# Patient Record
Sex: FEMALE | Race: ASIAN | NOT HISPANIC OR LATINO | Employment: UNEMPLOYED | URBAN - METROPOLITAN AREA
[De-identification: names, ages, dates, MRNs, and addresses within clinical notes are randomized per-mention and may not be internally consistent; named-entity substitution may affect disease eponyms.]

---

## 2017-01-15 ENCOUNTER — COMMUNICATION - HEALTHEAST (OUTPATIENT)
Dept: FAMILY MEDICINE | Facility: CLINIC | Age: 52
End: 2017-01-15

## 2017-01-31 ENCOUNTER — OFFICE VISIT - HEALTHEAST (OUTPATIENT)
Dept: FAMILY MEDICINE | Facility: CLINIC | Age: 52
End: 2017-01-31

## 2017-01-31 DIAGNOSIS — G89.29 CHRONIC BILATERAL LOW BACK PAIN WITHOUT SCIATICA: ICD-10-CM

## 2017-01-31 DIAGNOSIS — E55.9 VITAMIN D DEFICIENCY: ICD-10-CM

## 2017-01-31 DIAGNOSIS — S46.001D ROTATOR CUFF INJURY, RIGHT, SUBSEQUENT ENCOUNTER: ICD-10-CM

## 2017-01-31 DIAGNOSIS — K59.00 CONSTIPATION, UNSPECIFIED CONSTIPATION TYPE: ICD-10-CM

## 2017-01-31 DIAGNOSIS — M54.50 CHRONIC BILATERAL LOW BACK PAIN WITHOUT SCIATICA: ICD-10-CM

## 2017-01-31 DIAGNOSIS — E03.9 HYPOTHYROIDISM, UNSPECIFIED TYPE: ICD-10-CM

## 2017-01-31 DIAGNOSIS — F32.9 MAJOR DEPRESSIVE DISORDER, SINGLE EPISODE, UNSPECIFIED: ICD-10-CM

## 2017-01-31 ASSESSMENT — MIFFLIN-ST. JEOR: SCORE: 1054.22

## 2017-02-02 ENCOUNTER — AMBULATORY - HEALTHEAST (OUTPATIENT)
Dept: FAMILY MEDICINE | Facility: CLINIC | Age: 52
End: 2017-02-02

## 2017-02-03 ENCOUNTER — OFFICE VISIT - HEALTHEAST (OUTPATIENT)
Dept: PHYSICAL THERAPY | Facility: REHABILITATION | Age: 52
End: 2017-02-03

## 2017-02-03 DIAGNOSIS — S46.001D ROTATOR CUFF INJURY, RIGHT, SUBSEQUENT ENCOUNTER: ICD-10-CM

## 2017-02-03 DIAGNOSIS — S49.91XD RIGHT SHOULDER INJURY, SUBSEQUENT ENCOUNTER: ICD-10-CM

## 2017-02-07 ENCOUNTER — OFFICE VISIT - HEALTHEAST (OUTPATIENT)
Dept: PHYSICAL THERAPY | Facility: REHABILITATION | Age: 52
End: 2017-02-07

## 2017-02-07 DIAGNOSIS — S49.91XD RIGHT SHOULDER INJURY, SUBSEQUENT ENCOUNTER: ICD-10-CM

## 2017-02-07 DIAGNOSIS — S46.001D ROTATOR CUFF INJURY, RIGHT, SUBSEQUENT ENCOUNTER: ICD-10-CM

## 2017-02-09 ENCOUNTER — OFFICE VISIT - HEALTHEAST (OUTPATIENT)
Dept: PHYSICAL THERAPY | Facility: REHABILITATION | Age: 52
End: 2017-02-09

## 2017-02-09 DIAGNOSIS — S46.001D ROTATOR CUFF INJURY, RIGHT, SUBSEQUENT ENCOUNTER: ICD-10-CM

## 2017-02-09 DIAGNOSIS — S49.91XD RIGHT SHOULDER INJURY, SUBSEQUENT ENCOUNTER: ICD-10-CM

## 2017-02-16 ENCOUNTER — OFFICE VISIT - HEALTHEAST (OUTPATIENT)
Dept: PHYSICAL THERAPY | Facility: REHABILITATION | Age: 52
End: 2017-02-16

## 2017-02-16 DIAGNOSIS — S49.91XD RIGHT SHOULDER INJURY, SUBSEQUENT ENCOUNTER: ICD-10-CM

## 2017-02-16 DIAGNOSIS — S46.001D ROTATOR CUFF INJURY, RIGHT, SUBSEQUENT ENCOUNTER: ICD-10-CM

## 2017-02-24 ENCOUNTER — OFFICE VISIT - HEALTHEAST (OUTPATIENT)
Dept: PHYSICAL THERAPY | Facility: REHABILITATION | Age: 52
End: 2017-02-24

## 2017-02-24 DIAGNOSIS — S49.91XD RIGHT SHOULDER INJURY, SUBSEQUENT ENCOUNTER: ICD-10-CM

## 2017-02-24 DIAGNOSIS — S46.001D ROTATOR CUFF INJURY, RIGHT, SUBSEQUENT ENCOUNTER: ICD-10-CM

## 2017-02-28 ENCOUNTER — OFFICE VISIT - HEALTHEAST (OUTPATIENT)
Dept: FAMILY MEDICINE | Facility: CLINIC | Age: 52
End: 2017-02-28

## 2017-02-28 ENCOUNTER — RECORDS - HEALTHEAST (OUTPATIENT)
Dept: MAMMOGRAPHY | Facility: CLINIC | Age: 52
End: 2017-02-28

## 2017-02-28 DIAGNOSIS — Z12.31 ENCOUNTER FOR SCREENING MAMMOGRAM FOR MALIGNANT NEOPLASM OF BREAST: ICD-10-CM

## 2017-02-28 DIAGNOSIS — R19.7 DIARRHEA: ICD-10-CM

## 2017-02-28 ASSESSMENT — MIFFLIN-ST. JEOR: SCORE: 1056.21

## 2017-03-02 ENCOUNTER — OFFICE VISIT - HEALTHEAST (OUTPATIENT)
Dept: PHYSICAL THERAPY | Facility: REHABILITATION | Age: 52
End: 2017-03-02

## 2017-03-02 DIAGNOSIS — S49.91XD RIGHT SHOULDER INJURY, SUBSEQUENT ENCOUNTER: ICD-10-CM

## 2017-03-02 DIAGNOSIS — S46.001D ROTATOR CUFF INJURY, RIGHT, SUBSEQUENT ENCOUNTER: ICD-10-CM

## 2017-03-07 ENCOUNTER — OFFICE VISIT - HEALTHEAST (OUTPATIENT)
Dept: PHYSICAL THERAPY | Facility: REHABILITATION | Age: 52
End: 2017-03-07

## 2017-03-07 DIAGNOSIS — S46.001D ROTATOR CUFF INJURY, RIGHT, SUBSEQUENT ENCOUNTER: ICD-10-CM

## 2017-03-07 DIAGNOSIS — S49.91XD RIGHT SHOULDER INJURY, SUBSEQUENT ENCOUNTER: ICD-10-CM

## 2017-03-10 ENCOUNTER — OFFICE VISIT - HEALTHEAST (OUTPATIENT)
Dept: PHYSICAL THERAPY | Facility: REHABILITATION | Age: 52
End: 2017-03-10

## 2017-03-10 DIAGNOSIS — S49.91XD RIGHT SHOULDER INJURY, SUBSEQUENT ENCOUNTER: ICD-10-CM

## 2017-03-10 DIAGNOSIS — S46.001D ROTATOR CUFF INJURY, RIGHT, SUBSEQUENT ENCOUNTER: ICD-10-CM

## 2017-03-14 ENCOUNTER — OFFICE VISIT - HEALTHEAST (OUTPATIENT)
Dept: PHYSICAL THERAPY | Facility: REHABILITATION | Age: 52
End: 2017-03-14

## 2017-03-14 DIAGNOSIS — S49.91XD RIGHT SHOULDER INJURY, SUBSEQUENT ENCOUNTER: ICD-10-CM

## 2017-03-14 DIAGNOSIS — S46.001D ROTATOR CUFF INJURY, RIGHT, SUBSEQUENT ENCOUNTER: ICD-10-CM

## 2017-03-16 ENCOUNTER — OFFICE VISIT - HEALTHEAST (OUTPATIENT)
Dept: PHYSICAL THERAPY | Facility: REHABILITATION | Age: 52
End: 2017-03-16

## 2017-03-16 DIAGNOSIS — S49.91XD RIGHT SHOULDER INJURY, SUBSEQUENT ENCOUNTER: ICD-10-CM

## 2017-03-16 DIAGNOSIS — S46.001D ROTATOR CUFF INJURY, RIGHT, SUBSEQUENT ENCOUNTER: ICD-10-CM

## 2017-03-23 ENCOUNTER — OFFICE VISIT - HEALTHEAST (OUTPATIENT)
Dept: PHYSICAL THERAPY | Facility: REHABILITATION | Age: 52
End: 2017-03-23

## 2017-03-23 DIAGNOSIS — S46.001D ROTATOR CUFF INJURY, RIGHT, SUBSEQUENT ENCOUNTER: ICD-10-CM

## 2017-03-23 DIAGNOSIS — M25.521 ELBOW PAIN, RIGHT: ICD-10-CM

## 2017-03-23 DIAGNOSIS — S49.91XD RIGHT SHOULDER INJURY, SUBSEQUENT ENCOUNTER: ICD-10-CM

## 2017-03-31 ENCOUNTER — OFFICE VISIT - HEALTHEAST (OUTPATIENT)
Dept: PHYSICAL THERAPY | Facility: REHABILITATION | Age: 52
End: 2017-03-31

## 2017-03-31 DIAGNOSIS — S46.001D ROTATOR CUFF INJURY, RIGHT, SUBSEQUENT ENCOUNTER: ICD-10-CM

## 2017-03-31 DIAGNOSIS — M25.521 ELBOW PAIN, RIGHT: ICD-10-CM

## 2017-03-31 DIAGNOSIS — S49.91XD RIGHT SHOULDER INJURY, SUBSEQUENT ENCOUNTER: ICD-10-CM

## 2017-04-07 ENCOUNTER — OFFICE VISIT - HEALTHEAST (OUTPATIENT)
Dept: PHYSICAL THERAPY | Facility: REHABILITATION | Age: 52
End: 2017-04-07

## 2017-04-07 DIAGNOSIS — S46.001D ROTATOR CUFF INJURY, RIGHT, SUBSEQUENT ENCOUNTER: ICD-10-CM

## 2017-04-07 DIAGNOSIS — S49.91XD RIGHT SHOULDER INJURY, SUBSEQUENT ENCOUNTER: ICD-10-CM

## 2017-04-07 DIAGNOSIS — M25.521 ELBOW PAIN, RIGHT: ICD-10-CM

## 2017-04-11 ENCOUNTER — OFFICE VISIT - HEALTHEAST (OUTPATIENT)
Dept: PHYSICAL THERAPY | Facility: REHABILITATION | Age: 52
End: 2017-04-11

## 2017-04-11 DIAGNOSIS — S46.001D ROTATOR CUFF INJURY, RIGHT, SUBSEQUENT ENCOUNTER: ICD-10-CM

## 2017-04-11 DIAGNOSIS — S49.91XD RIGHT SHOULDER INJURY, SUBSEQUENT ENCOUNTER: ICD-10-CM

## 2017-04-12 ENCOUNTER — OFFICE VISIT - HEALTHEAST (OUTPATIENT)
Dept: FAMILY MEDICINE | Facility: CLINIC | Age: 52
End: 2017-04-12

## 2017-04-12 DIAGNOSIS — R10.11 RIGHT UPPER QUADRANT ABDOMINAL PAIN: ICD-10-CM

## 2017-04-12 DIAGNOSIS — M75.111 INCOMPLETE TEAR OF RIGHT ROTATOR CUFF: ICD-10-CM

## 2017-04-12 DIAGNOSIS — M54.50 CHRONIC BILATERAL LOW BACK PAIN WITHOUT SCIATICA: ICD-10-CM

## 2017-04-12 DIAGNOSIS — G89.29 CHRONIC BILATERAL LOW BACK PAIN WITHOUT SCIATICA: ICD-10-CM

## 2017-04-12 DIAGNOSIS — M77.11 LATERAL EPICONDYLITIS OF RIGHT ELBOW: ICD-10-CM

## 2017-04-12 DIAGNOSIS — F32.9 MAJOR DEPRESSIVE DISORDER, SINGLE EPISODE, UNSPECIFIED: ICD-10-CM

## 2017-04-12 DIAGNOSIS — E55.9 VITAMIN D DEFICIENCY: ICD-10-CM

## 2017-04-12 DIAGNOSIS — K29.50 CHRONIC GASTRITIS: ICD-10-CM

## 2017-04-12 ASSESSMENT — MIFFLIN-ST. JEOR: SCORE: 1067.83

## 2017-04-13 ENCOUNTER — OFFICE VISIT - HEALTHEAST (OUTPATIENT)
Dept: PHYSICAL THERAPY | Facility: REHABILITATION | Age: 52
End: 2017-04-13

## 2017-04-13 DIAGNOSIS — S49.91XD RIGHT SHOULDER INJURY, SUBSEQUENT ENCOUNTER: ICD-10-CM

## 2017-04-13 DIAGNOSIS — S46.001D ROTATOR CUFF INJURY, RIGHT, SUBSEQUENT ENCOUNTER: ICD-10-CM

## 2017-04-13 DIAGNOSIS — M25.521 ELBOW PAIN, RIGHT: ICD-10-CM

## 2017-04-30 ENCOUNTER — COMMUNICATION - HEALTHEAST (OUTPATIENT)
Dept: FAMILY MEDICINE | Facility: CLINIC | Age: 52
End: 2017-04-30

## 2017-06-01 ENCOUNTER — COMMUNICATION - HEALTHEAST (OUTPATIENT)
Dept: FAMILY MEDICINE | Facility: CLINIC | Age: 52
End: 2017-06-01

## 2017-06-01 DIAGNOSIS — E03.9 HYPOTHYROIDISM: ICD-10-CM

## 2017-08-01 ENCOUNTER — COMMUNICATION - HEALTHEAST (OUTPATIENT)
Dept: FAMILY MEDICINE | Facility: CLINIC | Age: 52
End: 2017-08-01

## 2017-08-01 DIAGNOSIS — K29.50 CHRONIC GASTRITIS: ICD-10-CM

## 2017-08-15 ENCOUNTER — OFFICE VISIT - HEALTHEAST (OUTPATIENT)
Dept: FAMILY MEDICINE | Facility: CLINIC | Age: 52
End: 2017-08-15

## 2017-08-15 DIAGNOSIS — M79.604 PAIN IN BOTH LOWER EXTREMITIES: ICD-10-CM

## 2017-08-15 DIAGNOSIS — E55.9 VITAMIN D DEFICIENCY: ICD-10-CM

## 2017-08-15 DIAGNOSIS — M79.605 PAIN IN BOTH LOWER EXTREMITIES: ICD-10-CM

## 2017-08-15 ASSESSMENT — MIFFLIN-ST. JEOR: SCORE: 1040.61

## 2017-08-18 LAB — ANA SER QL: 0.4 U

## 2017-09-04 ENCOUNTER — COMMUNICATION - HEALTHEAST (OUTPATIENT)
Dept: FAMILY MEDICINE | Facility: CLINIC | Age: 52
End: 2017-09-04

## 2017-09-04 DIAGNOSIS — E03.9 HYPOTHYROIDISM: ICD-10-CM

## 2017-09-28 ENCOUNTER — OFFICE VISIT - HEALTHEAST (OUTPATIENT)
Dept: FAMILY MEDICINE | Facility: CLINIC | Age: 52
End: 2017-09-28

## 2017-09-28 DIAGNOSIS — E03.8 OTHER SPECIFIED HYPOTHYROIDISM: ICD-10-CM

## 2017-09-28 DIAGNOSIS — F32.9 MAJOR DEPRESSIVE DISORDER, SINGLE EPISODE, UNSPECIFIED: ICD-10-CM

## 2017-09-28 DIAGNOSIS — E55.9 VITAMIN D DEFICIENCY: ICD-10-CM

## 2017-09-28 DIAGNOSIS — K59.00 CONSTIPATION, UNSPECIFIED CONSTIPATION TYPE: ICD-10-CM

## 2017-09-28 DIAGNOSIS — B07.0 PLANTAR WART OF LEFT FOOT: ICD-10-CM

## 2017-09-28 DIAGNOSIS — F41.9 ANXIETY: ICD-10-CM

## 2017-09-28 DIAGNOSIS — M72.2 PLANTAR FASCIITIS OF RIGHT FOOT: ICD-10-CM

## 2017-09-28 ASSESSMENT — MIFFLIN-ST. JEOR: SCORE: 1045.15

## 2017-10-12 ENCOUNTER — OFFICE VISIT - HEALTHEAST (OUTPATIENT)
Dept: FAMILY MEDICINE | Facility: CLINIC | Age: 52
End: 2017-10-12

## 2017-10-12 DIAGNOSIS — B07.0 PLANTAR WART OF LEFT FOOT: ICD-10-CM

## 2017-10-12 ASSESSMENT — MIFFLIN-ST. JEOR: SCORE: 1045.15

## 2018-01-23 ENCOUNTER — OFFICE VISIT - HEALTHEAST (OUTPATIENT)
Dept: FAMILY MEDICINE | Facility: CLINIC | Age: 53
End: 2018-01-23

## 2018-01-23 DIAGNOSIS — M54.50 LUMBAR BACK PAIN: ICD-10-CM

## 2018-02-04 ENCOUNTER — COMMUNICATION - HEALTHEAST (OUTPATIENT)
Dept: FAMILY MEDICINE | Facility: CLINIC | Age: 53
End: 2018-02-04

## 2018-02-20 ENCOUNTER — COMMUNICATION - HEALTHEAST (OUTPATIENT)
Dept: FAMILY MEDICINE | Facility: CLINIC | Age: 53
End: 2018-02-20

## 2018-03-06 ENCOUNTER — COMMUNICATION - HEALTHEAST (OUTPATIENT)
Dept: FAMILY MEDICINE | Facility: CLINIC | Age: 53
End: 2018-03-06

## 2018-03-16 ENCOUNTER — OFFICE VISIT - HEALTHEAST (OUTPATIENT)
Dept: FAMILY MEDICINE | Facility: CLINIC | Age: 53
End: 2018-03-16

## 2018-03-16 DIAGNOSIS — M54.9 BACK PAIN: ICD-10-CM

## 2018-03-16 DIAGNOSIS — E55.9 VITAMIN D DEFICIENCY: ICD-10-CM

## 2018-03-16 DIAGNOSIS — R53.83 FATIGUE: ICD-10-CM

## 2018-03-16 LAB
BASOPHILS # BLD AUTO: 0 THOU/UL (ref 0–0.2)
BASOPHILS NFR BLD AUTO: 0 % (ref 0–2)
EOSINOPHIL # BLD AUTO: 0.1 THOU/UL (ref 0–0.4)
EOSINOPHIL NFR BLD AUTO: 2 % (ref 0–6)
ERYTHROCYTE [DISTWIDTH] IN BLOOD BY AUTOMATED COUNT: 12.5 % (ref 11–14.5)
HCT VFR BLD AUTO: 42.5 % (ref 35–47)
HGB BLD-MCNC: 14.5 G/DL (ref 12–16)
LYMPHOCYTES # BLD AUTO: 1.7 THOU/UL (ref 0.8–4.4)
LYMPHOCYTES NFR BLD AUTO: 33 % (ref 20–40)
MCH RBC QN AUTO: 30.6 PG (ref 27–34)
MCHC RBC AUTO-ENTMCNC: 34 G/DL (ref 32–36)
MCV RBC AUTO: 90 FL (ref 80–100)
MONOCYTES # BLD AUTO: 0.3 THOU/UL (ref 0–0.9)
MONOCYTES NFR BLD AUTO: 6 % (ref 2–10)
NEUTROPHILS # BLD AUTO: 3.1 THOU/UL (ref 2–7.7)
NEUTROPHILS NFR BLD AUTO: 59 % (ref 50–70)
PLATELET # BLD AUTO: 173 THOU/UL (ref 140–440)
PMV BLD AUTO: 6.8 FL (ref 7–10)
RBC # BLD AUTO: 4.73 MILL/UL (ref 3.8–5.4)
TSH SERPL DL<=0.005 MIU/L-ACNC: 2.68 UIU/ML (ref 0.3–5)
WBC: 5.2 THOU/UL (ref 4–11)

## 2018-03-16 ASSESSMENT — MIFFLIN-ST. JEOR: SCORE: 1058.76

## 2018-03-19 LAB — 25(OH)D3 SERPL-MCNC: 20.5 NG/ML (ref 30–80)

## 2018-04-04 ENCOUNTER — OFFICE VISIT - HEALTHEAST (OUTPATIENT)
Dept: FAMILY MEDICINE | Facility: CLINIC | Age: 53
End: 2018-04-04

## 2018-04-04 DIAGNOSIS — Z90.49 HISTORY OF CHOLECYSTECTOMY: ICD-10-CM

## 2018-04-04 DIAGNOSIS — R10.11 RIGHT UPPER QUADRANT PAIN: ICD-10-CM

## 2018-04-04 DIAGNOSIS — R10.31 ABDOMINAL PAIN, RLQ: ICD-10-CM

## 2018-04-04 LAB
ALBUMIN SERPL-MCNC: 3.8 G/DL (ref 3.5–5)
ALP SERPL-CCNC: 122 U/L (ref 45–120)
ALT SERPL W P-5'-P-CCNC: 21 U/L (ref 0–45)
AST SERPL W P-5'-P-CCNC: 18 U/L (ref 0–40)
BILIRUB DIRECT SERPL-MCNC: 0.3 MG/DL
BILIRUB SERPL-MCNC: 1 MG/DL (ref 0–1)
PROT SERPL-MCNC: 6.8 G/DL (ref 6–8)

## 2018-04-04 ASSESSMENT — MIFFLIN-ST. JEOR: SCORE: 1036.08

## 2018-04-05 ENCOUNTER — HOSPITAL ENCOUNTER (OUTPATIENT)
Dept: ULTRASOUND IMAGING | Facility: HOSPITAL | Age: 53
Discharge: HOME | End: 2018-04-05
Attending: INTERPRETER

## 2018-04-21 ENCOUNTER — COMMUNICATION - HEALTHEAST (OUTPATIENT)
Dept: FAMILY MEDICINE | Facility: CLINIC | Age: 53
End: 2018-04-21

## 2018-05-21 ENCOUNTER — COMMUNICATION - HEALTHEAST (OUTPATIENT)
Dept: FAMILY MEDICINE | Facility: CLINIC | Age: 53
End: 2018-05-21

## 2018-05-21 DIAGNOSIS — E03.9 HYPOTHYROIDISM: ICD-10-CM

## 2018-05-31 ENCOUNTER — OFFICE VISIT - HEALTHEAST (OUTPATIENT)
Dept: FAMILY MEDICINE | Facility: CLINIC | Age: 53
End: 2018-05-31

## 2018-05-31 DIAGNOSIS — K29.50 CHRONIC GASTRITIS: ICD-10-CM

## 2018-05-31 DIAGNOSIS — R19.7 DIARRHEA IN ADULT PATIENT: ICD-10-CM

## 2018-05-31 ASSESSMENT — MIFFLIN-ST. JEOR: SCORE: 1024.74

## 2018-07-17 ENCOUNTER — OFFICE VISIT - HEALTHEAST (OUTPATIENT)
Dept: FAMILY MEDICINE | Facility: CLINIC | Age: 53
End: 2018-07-17

## 2018-07-17 DIAGNOSIS — R53.83 FATIGUE: ICD-10-CM

## 2018-07-17 DIAGNOSIS — E55.9 VITAMIN D DEFICIENCY: ICD-10-CM

## 2018-07-17 DIAGNOSIS — R19.7 DIARRHEA IN ADULT PATIENT: ICD-10-CM

## 2018-07-17 LAB
BASOPHILS # BLD AUTO: 0 THOU/UL (ref 0–0.2)
BASOPHILS NFR BLD AUTO: 0 % (ref 0–2)
EOSINOPHIL # BLD AUTO: 0.1 THOU/UL (ref 0–0.4)
EOSINOPHIL NFR BLD AUTO: 2 % (ref 0–6)
ERYTHROCYTE [DISTWIDTH] IN BLOOD BY AUTOMATED COUNT: 12.2 % (ref 11–14.5)
HCT VFR BLD AUTO: 44 % (ref 35–47)
HGB BLD-MCNC: 14.7 G/DL (ref 12–16)
LYMPHOCYTES # BLD AUTO: 1.7 THOU/UL (ref 0.8–4.4)
LYMPHOCYTES NFR BLD AUTO: 33 % (ref 20–40)
MCH RBC QN AUTO: 30.4 PG (ref 27–34)
MCHC RBC AUTO-ENTMCNC: 33.3 G/DL (ref 32–36)
MCV RBC AUTO: 91 FL (ref 80–100)
MONOCYTES # BLD AUTO: 0.4 THOU/UL (ref 0–0.9)
MONOCYTES NFR BLD AUTO: 8 % (ref 2–10)
NEUTROPHILS # BLD AUTO: 3 THOU/UL (ref 2–7.7)
NEUTROPHILS NFR BLD AUTO: 57 % (ref 50–70)
PLATELET # BLD AUTO: 173 THOU/UL (ref 140–440)
PMV BLD AUTO: 7.2 FL (ref 7–10)
RBC # BLD AUTO: 4.82 MILL/UL (ref 3.8–5.4)
WBC: 5.3 THOU/UL (ref 4–11)

## 2018-07-17 ASSESSMENT — MIFFLIN-ST. JEOR: SCORE: 1021.34

## 2018-07-18 ENCOUNTER — COMMUNICATION - HEALTHEAST (OUTPATIENT)
Dept: FAMILY MEDICINE | Facility: CLINIC | Age: 53
End: 2018-07-18

## 2018-07-24 ENCOUNTER — AMBULATORY - HEALTHEAST (OUTPATIENT)
Dept: LAB | Facility: CLINIC | Age: 53
End: 2018-07-24

## 2018-07-24 DIAGNOSIS — R19.7 DIARRHEA IN ADULT PATIENT: ICD-10-CM

## 2018-07-25 LAB
O+P STL MICRO: NORMAL

## 2018-07-26 LAB
ALBUMIN SERPL-MCNC: 3.8 G/DL (ref 3.5–5)
ALP SERPL-CCNC: 93 U/L (ref 45–120)
ALT SERPL W P-5'-P-CCNC: 16 U/L (ref 0–45)
AST SERPL W P-5'-P-CCNC: 15 U/L (ref 0–40)
BILIRUB DIRECT SERPL-MCNC: 0.2 MG/DL
BILIRUB SERPL-MCNC: 0.7 MG/DL (ref 0–1)
PROT SERPL-MCNC: 6.5 G/DL (ref 6–8)

## 2018-07-27 ENCOUNTER — AMBULATORY - HEALTHEAST (OUTPATIENT)
Dept: LAB | Facility: CLINIC | Age: 53
End: 2018-07-27

## 2018-07-27 DIAGNOSIS — R19.7 DIARRHEA IN ADULT PATIENT: ICD-10-CM

## 2018-07-27 LAB
C DIFF TOX B STL QL: NEGATIVE
RIBOTYPE 027/NAP1/BI: NORMAL

## 2018-07-28 LAB
SHIGA TOXIN 1: NEGATIVE
SHIGA TOXIN 2: NEGATIVE

## 2018-07-30 LAB
BACTERIA SPEC CULT: NORMAL
GLIADIN IGA SER-ACNC: 1 U/ML
GLIADIN IGG SER-ACNC: <0.4 U/ML
IGA SERPL-MCNC: 194 MG/DL (ref 65–400)
TTG IGA SER-ACNC: 0.1 U/ML
TTG IGG SER-ACNC: <0.6 U/ML

## 2018-08-17 ENCOUNTER — OFFICE VISIT - HEALTHEAST (OUTPATIENT)
Dept: FAMILY MEDICINE | Facility: CLINIC | Age: 53
End: 2018-08-17

## 2018-08-17 DIAGNOSIS — R19.7 DIARRHEA IN ADULT PATIENT: ICD-10-CM

## 2018-08-17 ASSESSMENT — MIFFLIN-ST. JEOR: SCORE: 1028.14

## 2018-09-13 ENCOUNTER — OFFICE VISIT - HEALTHEAST (OUTPATIENT)
Dept: FAMILY MEDICINE | Facility: CLINIC | Age: 53
End: 2018-09-13

## 2018-09-13 DIAGNOSIS — Z90.49 HISTORY OF CHOLECYSTECTOMY: ICD-10-CM

## 2018-09-13 DIAGNOSIS — R19.7 DIARRHEA IN ADULT PATIENT: ICD-10-CM

## 2018-09-13 DIAGNOSIS — K29.50 OTHER CHRONIC GASTRITIS WITHOUT HEMORRHAGE: ICD-10-CM

## 2018-09-13 DIAGNOSIS — Z23 NEED FOR IMMUNIZATION AGAINST INFLUENZA: ICD-10-CM

## 2018-09-13 DIAGNOSIS — F41.9 ANXIETY: ICD-10-CM

## 2018-09-13 DIAGNOSIS — E03.9 HYPOTHYROIDISM, UNSPECIFIED TYPE: ICD-10-CM

## 2018-09-13 DIAGNOSIS — E55.9 VITAMIN D DEFICIENCY: ICD-10-CM

## 2018-09-13 DIAGNOSIS — M54.50 CHRONIC BILATERAL LOW BACK PAIN WITHOUT SCIATICA: ICD-10-CM

## 2018-09-13 DIAGNOSIS — G89.29 CHRONIC BILATERAL LOW BACK PAIN WITHOUT SCIATICA: ICD-10-CM

## 2018-09-13 DIAGNOSIS — F32.4 MAJOR DEPRESSIVE DISORDER WITH SINGLE EPISODE, IN PARTIAL REMISSION (H): ICD-10-CM

## 2018-09-13 ASSESSMENT — MIFFLIN-ST. JEOR: SCORE: 1027.55

## 2018-12-13 ENCOUNTER — OFFICE VISIT - HEALTHEAST (OUTPATIENT)
Dept: FAMILY MEDICINE | Facility: CLINIC | Age: 53
End: 2018-12-13

## 2018-12-13 DIAGNOSIS — F32.4 MAJOR DEPRESSIVE DISORDER WITH SINGLE EPISODE, IN PARTIAL REMISSION (H): ICD-10-CM

## 2018-12-13 DIAGNOSIS — F41.9 ANXIETY: ICD-10-CM

## 2018-12-13 DIAGNOSIS — R53.83 FATIGUE: ICD-10-CM

## 2018-12-13 DIAGNOSIS — K29.50 OTHER CHRONIC GASTRITIS WITHOUT HEMORRHAGE: ICD-10-CM

## 2018-12-13 DIAGNOSIS — E55.9 VITAMIN D DEFICIENCY: ICD-10-CM

## 2018-12-13 DIAGNOSIS — E03.9 ACQUIRED HYPOTHYROIDISM: ICD-10-CM

## 2018-12-13 ASSESSMENT — MIFFLIN-ST. JEOR: SCORE: 1027.55

## 2019-01-07 ENCOUNTER — OFFICE VISIT - HEALTHEAST (OUTPATIENT)
Dept: FAMILY MEDICINE | Facility: CLINIC | Age: 54
End: 2019-01-07

## 2019-01-07 ENCOUNTER — RECORDS - HEALTHEAST (OUTPATIENT)
Dept: GENERAL RADIOLOGY | Facility: CLINIC | Age: 54
End: 2019-01-07

## 2019-01-07 DIAGNOSIS — M54.41 LUMBAGO WITH SCIATICA, RIGHT SIDE: ICD-10-CM

## 2019-01-07 DIAGNOSIS — E03.9 HYPOTHYROIDISM, UNSPECIFIED TYPE: ICD-10-CM

## 2019-01-07 DIAGNOSIS — M54.41 RIGHT-SIDED LOW BACK PAIN WITH RIGHT-SIDED SCIATICA, UNSPECIFIED CHRONICITY: ICD-10-CM

## 2019-01-07 DIAGNOSIS — R09.A2 GLOBUS SENSATION: ICD-10-CM

## 2019-01-07 LAB — TSH SERPL DL<=0.005 MIU/L-ACNC: 2.1 UIU/ML (ref 0.3–5)

## 2019-01-07 ASSESSMENT — MIFFLIN-ST. JEOR: SCORE: 1033.81

## 2019-01-17 ENCOUNTER — COMMUNICATION - HEALTHEAST (OUTPATIENT)
Dept: FAMILY MEDICINE | Facility: CLINIC | Age: 54
End: 2019-01-17

## 2019-01-17 DIAGNOSIS — M54.41 RIGHT-SIDED LOW BACK PAIN WITH RIGHT-SIDED SCIATICA, UNSPECIFIED CHRONICITY: ICD-10-CM

## 2019-01-21 ENCOUNTER — RECORDS - HEALTHEAST (OUTPATIENT)
Dept: MAMMOGRAPHY | Facility: CLINIC | Age: 54
End: 2019-01-21

## 2019-01-21 ENCOUNTER — OFFICE VISIT - HEALTHEAST (OUTPATIENT)
Dept: FAMILY MEDICINE | Facility: CLINIC | Age: 54
End: 2019-01-21

## 2019-01-21 DIAGNOSIS — Z12.31 ENCOUNTER FOR SCREENING MAMMOGRAM FOR MALIGNANT NEOPLASM OF BREAST: ICD-10-CM

## 2019-01-21 DIAGNOSIS — Z00.00 HEALTH MAINTENANCE EXAMINATION: ICD-10-CM

## 2019-01-21 ASSESSMENT — MIFFLIN-ST. JEOR: SCORE: 1036.08

## 2019-01-22 LAB
HPV SOURCE: NORMAL
HUMAN PAPILLOMA VIRUS 16 DNA: NEGATIVE
HUMAN PAPILLOMA VIRUS 18 DNA: NEGATIVE
HUMAN PAPILLOMA VIRUS FINAL DIAGNOSIS: NORMAL
HUMAN PAPILLOMA VIRUS OTHER HR: NEGATIVE
SPECIMEN DESCRIPTION: NORMAL

## 2019-01-29 LAB
BKR LAB AP ABNORMAL BLEEDING: NO
BKR LAB AP BIRTH CONTROL/HORMONES: ABNORMAL
BKR LAB AP CERVICAL APPEARANCE: NORMAL
BKR LAB AP GYN ADEQUACY: ABNORMAL
BKR LAB AP GYN INTERPRETATION: ABNORMAL
BKR LAB AP HPV REFLEX: ABNORMAL
BKR LAB AP LMP: ABNORMAL
BKR LAB AP PATIENT STATUS: ABNORMAL
BKR LAB AP PREVIOUS ABNORMAL: ABNORMAL
BKR LAB AP PREVIOUS NORMAL: ABNORMAL
HIGH RISK?: NO
PATH REPORT.COMMENTS IMP SPEC: ABNORMAL
RESULT FLAG (HE HISTORICAL CONVERSION): ABNORMAL

## 2019-02-01 ENCOUNTER — COMMUNICATION - HEALTHEAST (OUTPATIENT)
Dept: FAMILY MEDICINE | Facility: CLINIC | Age: 54
End: 2019-02-01

## 2019-02-25 ENCOUNTER — OFFICE VISIT - HEALTHEAST (OUTPATIENT)
Dept: FAMILY MEDICINE | Facility: CLINIC | Age: 54
End: 2019-02-25

## 2019-02-25 DIAGNOSIS — M54.41 CHRONIC MIDLINE LOW BACK PAIN WITH RIGHT-SIDED SCIATICA: ICD-10-CM

## 2019-02-25 DIAGNOSIS — F41.9 ANXIETY: ICD-10-CM

## 2019-02-25 DIAGNOSIS — R10.31 ABDOMINAL PAIN, RIGHT LOWER QUADRANT: ICD-10-CM

## 2019-02-25 DIAGNOSIS — F32.4 MAJOR DEPRESSIVE DISORDER WITH SINGLE EPISODE, IN PARTIAL REMISSION (H): ICD-10-CM

## 2019-02-25 DIAGNOSIS — M54.41 RIGHT-SIDED LOW BACK PAIN WITH RIGHT-SIDED SCIATICA, UNSPECIFIED CHRONICITY: ICD-10-CM

## 2019-02-25 DIAGNOSIS — K29.50 CHRONIC GASTRITIS: ICD-10-CM

## 2019-02-25 DIAGNOSIS — F51.04 PSYCHOPHYSIOLOGICAL INSOMNIA: ICD-10-CM

## 2019-02-25 DIAGNOSIS — E03.9 ACQUIRED HYPOTHYROIDISM: ICD-10-CM

## 2019-02-25 DIAGNOSIS — G89.29 CHRONIC MIDLINE LOW BACK PAIN WITH RIGHT-SIDED SCIATICA: ICD-10-CM

## 2019-02-25 ASSESSMENT — MIFFLIN-ST. JEOR: SCORE: 1036.08

## 2019-03-05 ENCOUNTER — COMMUNICATION - HEALTHEAST (OUTPATIENT)
Dept: FAMILY MEDICINE | Facility: CLINIC | Age: 54
End: 2019-03-05

## 2019-03-05 ENCOUNTER — OFFICE VISIT - HEALTHEAST (OUTPATIENT)
Dept: FAMILY MEDICINE | Facility: CLINIC | Age: 54
End: 2019-03-05

## 2019-03-05 DIAGNOSIS — J02.9 SORE THROAT: ICD-10-CM

## 2019-03-05 LAB — DEPRECATED S PYO AG THROAT QL EIA: NORMAL

## 2019-03-05 ASSESSMENT — MIFFLIN-ST. JEOR: SCORE: 1036.08

## 2019-03-06 LAB — GROUP A STREP BY PCR: NORMAL

## 2019-04-04 ENCOUNTER — OFFICE VISIT - HEALTHEAST (OUTPATIENT)
Dept: FAMILY MEDICINE | Facility: CLINIC | Age: 54
End: 2019-04-04

## 2019-04-04 DIAGNOSIS — M54.50 LUMBAR BACK PAIN: ICD-10-CM

## 2019-04-04 DIAGNOSIS — F32.4 MAJOR DEPRESSIVE DISORDER WITH SINGLE EPISODE, IN PARTIAL REMISSION (H): ICD-10-CM

## 2019-04-04 DIAGNOSIS — E55.9 VITAMIN D DEFICIENCY: ICD-10-CM

## 2019-04-04 DIAGNOSIS — R53.83 FATIGUE: ICD-10-CM

## 2019-04-04 DIAGNOSIS — K29.50 OTHER CHRONIC GASTRITIS WITHOUT HEMORRHAGE: ICD-10-CM

## 2019-04-04 DIAGNOSIS — E03.9 HYPOTHYROIDISM: ICD-10-CM

## 2019-04-04 ASSESSMENT — MIFFLIN-ST. JEOR: SCORE: 1045.15

## 2019-04-26 ENCOUNTER — OFFICE VISIT - HEALTHEAST (OUTPATIENT)
Dept: FAMILY MEDICINE | Facility: CLINIC | Age: 54
End: 2019-04-26

## 2019-04-26 DIAGNOSIS — K52.9 CHRONIC DIARRHEA OF UNKNOWN ORIGIN: ICD-10-CM

## 2019-04-26 ASSESSMENT — MIFFLIN-ST. JEOR: SCORE: 1031.54

## 2019-05-10 ENCOUNTER — RECORDS - HEALTHEAST (OUTPATIENT)
Dept: ADMINISTRATIVE | Facility: OTHER | Age: 54
End: 2019-05-10

## 2019-05-13 ENCOUNTER — RECORDS - HEALTHEAST (OUTPATIENT)
Dept: ADMINISTRATIVE | Facility: OTHER | Age: 54
End: 2019-05-13

## 2019-05-29 ENCOUNTER — RECORDS - HEALTHEAST (OUTPATIENT)
Dept: ADMINISTRATIVE | Facility: OTHER | Age: 54
End: 2019-05-29

## 2019-07-03 ENCOUNTER — OFFICE VISIT - HEALTHEAST (OUTPATIENT)
Dept: FAMILY MEDICINE | Facility: CLINIC | Age: 54
End: 2019-07-03

## 2019-07-03 DIAGNOSIS — K29.50 OTHER CHRONIC GASTRITIS WITHOUT HEMORRHAGE: ICD-10-CM

## 2019-07-03 DIAGNOSIS — F32.4 MAJOR DEPRESSIVE DISORDER WITH SINGLE EPISODE, IN PARTIAL REMISSION (H): ICD-10-CM

## 2019-07-03 DIAGNOSIS — E03.9 ACQUIRED HYPOTHYROIDISM: ICD-10-CM

## 2019-07-03 ASSESSMENT — MIFFLIN-ST. JEOR: SCORE: 1031.54

## 2019-09-16 ENCOUNTER — OFFICE VISIT - HEALTHEAST (OUTPATIENT)
Dept: FAMILY MEDICINE | Facility: CLINIC | Age: 54
End: 2019-09-16

## 2019-09-16 DIAGNOSIS — B07.0 PLANTAR WARTS: ICD-10-CM

## 2019-09-16 DIAGNOSIS — F32.4 MAJOR DEPRESSIVE DISORDER WITH SINGLE EPISODE, IN PARTIAL REMISSION (H): ICD-10-CM

## 2019-09-16 DIAGNOSIS — R10.32 ABDOMINAL PAIN, LEFT LOWER QUADRANT: ICD-10-CM

## 2019-09-16 DIAGNOSIS — F41.9 ANXIETY: ICD-10-CM

## 2019-09-16 DIAGNOSIS — G89.29 CHRONIC BILATERAL LOW BACK PAIN WITHOUT SCIATICA: ICD-10-CM

## 2019-09-16 DIAGNOSIS — K29.50 OTHER CHRONIC GASTRITIS WITHOUT HEMORRHAGE: ICD-10-CM

## 2019-09-16 DIAGNOSIS — M54.50 CHRONIC BILATERAL LOW BACK PAIN WITHOUT SCIATICA: ICD-10-CM

## 2019-09-16 DIAGNOSIS — E03.9 ACQUIRED HYPOTHYROIDISM: ICD-10-CM

## 2019-09-16 ASSESSMENT — MIFFLIN-ST. JEOR: SCORE: 1036.08

## 2019-09-18 ENCOUNTER — AMBULATORY - HEALTHEAST (OUTPATIENT)
Dept: FAMILY MEDICINE | Facility: CLINIC | Age: 54
End: 2019-09-18

## 2019-09-18 DIAGNOSIS — R10.32 ABDOMINAL PAIN, LEFT LOWER QUADRANT: ICD-10-CM

## 2019-09-30 ENCOUNTER — COMMUNICATION - HEALTHEAST (OUTPATIENT)
Dept: ADMINISTRATIVE | Facility: CLINIC | Age: 54
End: 2019-09-30

## 2019-10-02 ENCOUNTER — OFFICE VISIT - HEALTHEAST (OUTPATIENT)
Dept: FAMILY MEDICINE | Facility: CLINIC | Age: 54
End: 2019-10-02

## 2019-10-02 DIAGNOSIS — K29.50 OTHER CHRONIC GASTRITIS WITHOUT HEMORRHAGE: ICD-10-CM

## 2019-10-02 DIAGNOSIS — E03.9 ACQUIRED HYPOTHYROIDISM: ICD-10-CM

## 2019-10-02 DIAGNOSIS — F32.4 MAJOR DEPRESSIVE DISORDER WITH SINGLE EPISODE, IN PARTIAL REMISSION (H): ICD-10-CM

## 2019-10-02 DIAGNOSIS — F41.9 ANXIETY: ICD-10-CM

## 2019-10-02 DIAGNOSIS — M54.50 CHRONIC BILATERAL LOW BACK PAIN WITHOUT SCIATICA: ICD-10-CM

## 2019-10-02 DIAGNOSIS — Z90.49 HISTORY OF CHOLECYSTECTOMY: ICD-10-CM

## 2019-10-02 DIAGNOSIS — G89.29 CHRONIC BILATERAL LOW BACK PAIN WITHOUT SCIATICA: ICD-10-CM

## 2019-10-02 DIAGNOSIS — R19.5 LOOSE STOOLS: ICD-10-CM

## 2019-10-02 DIAGNOSIS — Z23 NEED FOR IMMUNIZATION AGAINST INFLUENZA: ICD-10-CM

## 2019-10-02 ASSESSMENT — MIFFLIN-ST. JEOR: SCORE: 1027.01

## 2019-10-02 ASSESSMENT — PATIENT HEALTH QUESTIONNAIRE - PHQ9: SUM OF ALL RESPONSES TO PHQ QUESTIONS 1-9: 19

## 2020-01-13 ENCOUNTER — OFFICE VISIT - HEALTHEAST (OUTPATIENT)
Dept: FAMILY MEDICINE | Facility: CLINIC | Age: 55
End: 2020-01-13

## 2020-01-13 DIAGNOSIS — F51.04 PSYCHOPHYSIOLOGICAL INSOMNIA: ICD-10-CM

## 2020-01-13 DIAGNOSIS — E55.9 VITAMIN D DEFICIENCY: ICD-10-CM

## 2020-01-13 DIAGNOSIS — G89.29 CHRONIC MIDLINE LOW BACK PAIN WITH RIGHT-SIDED SCIATICA: ICD-10-CM

## 2020-01-13 DIAGNOSIS — M54.41 CHRONIC MIDLINE LOW BACK PAIN WITH RIGHT-SIDED SCIATICA: ICD-10-CM

## 2020-01-13 DIAGNOSIS — E03.9 HYPOTHYROIDISM: ICD-10-CM

## 2020-01-13 DIAGNOSIS — F32.4 MAJOR DEPRESSIVE DISORDER WITH SINGLE EPISODE, IN PARTIAL REMISSION (H): ICD-10-CM

## 2020-01-13 DIAGNOSIS — M79.10 MUSCLE PAIN: ICD-10-CM

## 2020-01-13 DIAGNOSIS — R50.9 FEVER: ICD-10-CM

## 2020-01-13 LAB
FLUAV AG SPEC QL IA: NORMAL
FLUBV AG SPEC QL IA: NORMAL

## 2020-01-13 ASSESSMENT — MIFFLIN-ST. JEOR: SCORE: 1028.14

## 2020-01-13 ASSESSMENT — PATIENT HEALTH QUESTIONNAIRE - PHQ9: SUM OF ALL RESPONSES TO PHQ QUESTIONS 1-9: 16

## 2020-04-07 ENCOUNTER — OFFICE VISIT - HEALTHEAST (OUTPATIENT)
Dept: FAMILY MEDICINE | Facility: CLINIC | Age: 55
End: 2020-04-07

## 2020-04-07 ENCOUNTER — COMMUNICATION - HEALTHEAST (OUTPATIENT)
Dept: FAMILY MEDICINE | Facility: CLINIC | Age: 55
End: 2020-04-07

## 2020-04-07 DIAGNOSIS — M79.662 PAIN IN BOTH LOWER LEGS: ICD-10-CM

## 2020-04-07 DIAGNOSIS — M79.661 PAIN IN BOTH LOWER LEGS: ICD-10-CM

## 2020-04-07 DIAGNOSIS — F43.21 GRIEF REACTION: ICD-10-CM

## 2020-04-07 DIAGNOSIS — F32.4 MAJOR DEPRESSIVE DISORDER WITH SINGLE EPISODE, IN PARTIAL REMISSION (H): ICD-10-CM

## 2020-04-07 ASSESSMENT — PATIENT HEALTH QUESTIONNAIRE - PHQ9: SUM OF ALL RESPONSES TO PHQ QUESTIONS 1-9: 14

## 2020-04-10 ENCOUNTER — COMMUNICATION - HEALTHEAST (OUTPATIENT)
Dept: BEHAVIORAL HEALTH | Facility: CLINIC | Age: 55
End: 2020-04-10

## 2020-04-14 ENCOUNTER — OFFICE VISIT - HEALTHEAST (OUTPATIENT)
Dept: FAMILY MEDICINE | Facility: CLINIC | Age: 55
End: 2020-04-14

## 2020-04-14 DIAGNOSIS — F32.4 MAJOR DEPRESSIVE DISORDER WITH SINGLE EPISODE, IN PARTIAL REMISSION (H): ICD-10-CM

## 2020-04-14 DIAGNOSIS — F43.21 GRIEF REACTION: ICD-10-CM

## 2020-04-14 DIAGNOSIS — M79.662 PAIN IN BOTH LOWER LEGS: ICD-10-CM

## 2020-04-14 DIAGNOSIS — M79.661 PAIN IN BOTH LOWER LEGS: ICD-10-CM

## 2020-05-04 ENCOUNTER — COMMUNICATION - HEALTHEAST (OUTPATIENT)
Dept: FAMILY MEDICINE | Facility: CLINIC | Age: 55
End: 2020-05-04

## 2020-05-19 ENCOUNTER — OFFICE VISIT - HEALTHEAST (OUTPATIENT)
Dept: FAMILY MEDICINE | Facility: CLINIC | Age: 55
End: 2020-05-19

## 2020-05-19 DIAGNOSIS — H53.8 BLURRED VISION: ICD-10-CM

## 2020-05-19 DIAGNOSIS — M79.661 PAIN IN BOTH LOWER LEGS: ICD-10-CM

## 2020-05-19 DIAGNOSIS — M79.662 PAIN IN BOTH LOWER LEGS: ICD-10-CM

## 2020-05-19 DIAGNOSIS — E03.9 HYPOTHYROIDISM: ICD-10-CM

## 2020-05-19 DIAGNOSIS — F32.4 MAJOR DEPRESSIVE DISORDER WITH SINGLE EPISODE, IN PARTIAL REMISSION (H): ICD-10-CM

## 2020-05-19 DIAGNOSIS — E03.9 HYPOTHYROIDISM, UNSPECIFIED TYPE: ICD-10-CM

## 2020-08-11 ENCOUNTER — COMMUNICATION - HEALTHEAST (OUTPATIENT)
Dept: FAMILY MEDICINE | Facility: CLINIC | Age: 55
End: 2020-08-11

## 2020-08-14 ENCOUNTER — OFFICE VISIT - HEALTHEAST (OUTPATIENT)
Dept: FAMILY MEDICINE | Facility: CLINIC | Age: 55
End: 2020-08-14

## 2020-08-14 DIAGNOSIS — B07.0 PLANTAR WARTS: ICD-10-CM

## 2020-08-14 ASSESSMENT — MIFFLIN-ST. JEOR: SCORE: 1039.48

## 2020-09-14 ENCOUNTER — OFFICE VISIT - HEALTHEAST (OUTPATIENT)
Dept: FAMILY MEDICINE | Facility: CLINIC | Age: 55
End: 2020-09-14

## 2020-09-14 DIAGNOSIS — Z28.39 IMMUNIZATION DEFICIENCY: ICD-10-CM

## 2020-09-14 DIAGNOSIS — M79.2 RADICULAR PAIN IN LEFT ARM: ICD-10-CM

## 2020-09-14 ASSESSMENT — MIFFLIN-ST. JEOR: SCORE: 1040.61

## 2020-09-15 ENCOUNTER — HOSPITAL ENCOUNTER (OUTPATIENT)
Dept: MRI IMAGING | Facility: CLINIC | Age: 55
Discharge: HOME | End: 2020-09-15
Attending: FAMILY MEDICINE

## 2020-09-15 DIAGNOSIS — M79.2 RADICULAR PAIN IN LEFT ARM: ICD-10-CM

## 2020-09-23 ENCOUNTER — HOSPITAL ENCOUNTER (OUTPATIENT)
Dept: PHYSICAL MEDICINE AND REHAB | Facility: CLINIC | Age: 55
Discharge: HOME | End: 2020-09-23
Attending: FAMILY MEDICINE

## 2020-09-23 DIAGNOSIS — M48.02 FORAMINAL STENOSIS OF CERVICAL REGION: ICD-10-CM

## 2020-09-23 DIAGNOSIS — M50.20 PROTRUSION OF CERVICAL INTERVERTEBRAL DISC: ICD-10-CM

## 2020-09-23 DIAGNOSIS — M79.2 RADICULAR PAIN IN LEFT ARM: ICD-10-CM

## 2020-09-23 DIAGNOSIS — G89.29 CHRONIC BILATERAL LOW BACK PAIN WITHOUT SCIATICA: ICD-10-CM

## 2020-09-23 DIAGNOSIS — M54.50 CHRONIC BILATERAL LOW BACK PAIN WITHOUT SCIATICA: ICD-10-CM

## 2020-09-23 DIAGNOSIS — M54.12 LEFT CERVICAL RADICULOPATHY: ICD-10-CM

## 2020-09-25 ENCOUNTER — HOSPITAL ENCOUNTER (OUTPATIENT)
Dept: PHYSICAL MEDICINE AND REHAB | Facility: CLINIC | Age: 55
Discharge: HOME | End: 2020-09-25
Attending: PAIN MEDICINE

## 2020-09-25 DIAGNOSIS — M50.20 PROTRUSION OF CERVICAL INTERVERTEBRAL DISC: ICD-10-CM

## 2020-09-25 DIAGNOSIS — M54.12 LEFT CERVICAL RADICULOPATHY: ICD-10-CM

## 2020-09-25 DIAGNOSIS — M48.02 FORAMINAL STENOSIS OF CERVICAL REGION: ICD-10-CM

## 2020-10-09 ENCOUNTER — HOSPITAL ENCOUNTER (OUTPATIENT)
Dept: PHYSICAL MEDICINE AND REHAB | Facility: CLINIC | Age: 55
Discharge: HOME | End: 2020-10-09
Attending: NURSE PRACTITIONER

## 2020-10-09 DIAGNOSIS — M54.12 LEFT CERVICAL RADICULOPATHY: ICD-10-CM

## 2020-10-09 DIAGNOSIS — M50.20 PROTRUSION OF CERVICAL INTERVERTEBRAL DISC: ICD-10-CM

## 2020-10-09 DIAGNOSIS — M54.50 CHRONIC BILATERAL LOW BACK PAIN WITHOUT SCIATICA: ICD-10-CM

## 2020-10-09 DIAGNOSIS — M48.02 FORAMINAL STENOSIS OF CERVICAL REGION: ICD-10-CM

## 2020-10-09 DIAGNOSIS — M79.18 MYOFASCIAL PAIN: ICD-10-CM

## 2020-10-09 DIAGNOSIS — M79.2 RADICULAR PAIN IN LEFT ARM: ICD-10-CM

## 2020-10-09 DIAGNOSIS — G89.29 CHRONIC BILATERAL LOW BACK PAIN WITHOUT SCIATICA: ICD-10-CM

## 2020-12-02 ENCOUNTER — OFFICE VISIT - HEALTHEAST (OUTPATIENT)
Dept: FAMILY MEDICINE | Facility: CLINIC | Age: 55
End: 2020-12-02

## 2020-12-02 ENCOUNTER — OFFICE VISIT - HEALTHEAST (OUTPATIENT)
Dept: INTERPRETER SERVICES | Facility: CLINIC | Age: 55
End: 2020-12-02

## 2020-12-02 ENCOUNTER — RECORDS - HEALTHEAST (OUTPATIENT)
Dept: MAMMOGRAPHY | Facility: CLINIC | Age: 55
End: 2020-12-02

## 2020-12-02 DIAGNOSIS — K29.50 CHRONIC GASTRITIS: ICD-10-CM

## 2020-12-02 DIAGNOSIS — B07.0 PLANTAR WARTS: ICD-10-CM

## 2020-12-02 DIAGNOSIS — F32.4 MAJOR DEPRESSIVE DISORDER WITH SINGLE EPISODE, IN PARTIAL REMISSION (H): ICD-10-CM

## 2020-12-02 DIAGNOSIS — Z13.220 LIPID SCREENING: ICD-10-CM

## 2020-12-02 DIAGNOSIS — E56.9 VITAMIN DEFICIENCY, UNSPECIFIED: ICD-10-CM

## 2020-12-02 DIAGNOSIS — Z11.4 SCREENING FOR HIV WITHOUT PRESENCE OF RISK FACTORS: ICD-10-CM

## 2020-12-02 DIAGNOSIS — E56.9 VITAMIN DEFICIENCY: ICD-10-CM

## 2020-12-02 DIAGNOSIS — M54.41 CHRONIC MIDLINE LOW BACK PAIN WITH RIGHT-SIDED SCIATICA: ICD-10-CM

## 2020-12-02 DIAGNOSIS — Z12.31 ENCOUNTER FOR SCREENING MAMMOGRAM FOR BREAST CANCER: ICD-10-CM

## 2020-12-02 DIAGNOSIS — Z90.49 HISTORY OF CHOLECYSTECTOMY: ICD-10-CM

## 2020-12-02 DIAGNOSIS — E55.9 VITAMIN D DEFICIENCY: ICD-10-CM

## 2020-12-02 DIAGNOSIS — F41.9 ANXIETY: ICD-10-CM

## 2020-12-02 DIAGNOSIS — Z11.59 ENCOUNTER FOR HEPATITIS C SCREENING TEST FOR LOW RISK PATIENT: ICD-10-CM

## 2020-12-02 DIAGNOSIS — G89.29 CHRONIC MIDLINE LOW BACK PAIN WITH RIGHT-SIDED SCIATICA: ICD-10-CM

## 2020-12-02 DIAGNOSIS — F32.1 MODERATE MAJOR DEPRESSION (H): ICD-10-CM

## 2020-12-02 DIAGNOSIS — F51.04 PSYCHOPHYSIOLOGICAL INSOMNIA: ICD-10-CM

## 2020-12-02 DIAGNOSIS — E03.9 HYPOTHYROIDISM, UNSPECIFIED TYPE: ICD-10-CM

## 2020-12-02 LAB
ALBUMIN SERPL-MCNC: 4 G/DL (ref 3.5–5)
ALP SERPL-CCNC: 96 U/L (ref 45–120)
ALT SERPL W P-5'-P-CCNC: 19 U/L (ref 0–45)
ANION GAP SERPL CALCULATED.3IONS-SCNC: 12 MMOL/L (ref 5–18)
AST SERPL W P-5'-P-CCNC: 18 U/L (ref 0–40)
BASOPHILS # BLD AUTO: 0 THOU/UL (ref 0–0.2)
BASOPHILS NFR BLD AUTO: 0 % (ref 0–2)
BILIRUB SERPL-MCNC: 0.9 MG/DL (ref 0–1)
BUN SERPL-MCNC: 16 MG/DL (ref 8–22)
CALCIUM SERPL-MCNC: 8.7 MG/DL (ref 8.5–10.5)
CHLORIDE BLD-SCNC: 107 MMOL/L (ref 98–107)
CHOLEST SERPL-MCNC: 147 MG/DL
CO2 SERPL-SCNC: 24 MMOL/L (ref 22–31)
CREAT SERPL-MCNC: 0.7 MG/DL (ref 0.6–1.1)
EOSINOPHIL # BLD AUTO: 0.1 THOU/UL (ref 0–0.4)
EOSINOPHIL NFR BLD AUTO: 3 % (ref 0–6)
ERYTHROCYTE [DISTWIDTH] IN BLOOD BY AUTOMATED COUNT: 11.3 % (ref 11–14.5)
FASTING STATUS PATIENT QL REPORTED: NO
GFR SERPL CREATININE-BSD FRML MDRD: >60 ML/MIN/1.73M2
GLUCOSE BLD-MCNC: 91 MG/DL (ref 70–125)
HCT VFR BLD AUTO: 43.1 % (ref 35–47)
HDLC SERPL-MCNC: 39 MG/DL
HGB BLD-MCNC: 15.3 G/DL (ref 12–16)
HIV 1+2 AB+HIV1 P24 AG SERPL QL IA: NEGATIVE
LDLC SERPL CALC-MCNC: 90 MG/DL
LYMPHOCYTES # BLD AUTO: 1.4 THOU/UL (ref 0.8–4.4)
LYMPHOCYTES NFR BLD AUTO: 32 % (ref 20–40)
MCH RBC QN AUTO: 32.1 PG (ref 27–34)
MCHC RBC AUTO-ENTMCNC: 35.4 G/DL (ref 32–36)
MCV RBC AUTO: 91 FL (ref 80–100)
MONOCYTES # BLD AUTO: 0.3 THOU/UL (ref 0–0.9)
MONOCYTES NFR BLD AUTO: 6 % (ref 2–10)
NEUTROPHILS # BLD AUTO: 2.6 THOU/UL (ref 2–7.7)
NEUTROPHILS NFR BLD AUTO: 59 % (ref 50–70)
PLATELET # BLD AUTO: 176 THOU/UL (ref 140–440)
PMV BLD AUTO: 7.2 FL (ref 7–10)
POTASSIUM BLD-SCNC: 4.2 MMOL/L (ref 3.5–5)
PROT SERPL-MCNC: 6.5 G/DL (ref 6–8)
RBC # BLD AUTO: 4.75 MILL/UL (ref 3.8–5.4)
SODIUM SERPL-SCNC: 143 MMOL/L (ref 136–145)
TRIGL SERPL-MCNC: 90 MG/DL
TSH SERPL DL<=0.005 MIU/L-ACNC: 1.98 UIU/ML (ref 0.3–5)
WBC: 4.4 THOU/UL (ref 4–11)

## 2020-12-02 RX ORDER — ACETAMINOPHEN 500 MG
TABLET ORAL
Qty: 100 TABLET | Refills: 3 | Status: SHIPPED | OUTPATIENT
Start: 2020-12-02 | End: 2023-03-10

## 2020-12-02 RX ORDER — TRAZODONE HYDROCHLORIDE 50 MG/1
TABLET, FILM COATED ORAL
Qty: 90 TABLET | Refills: 3 | Status: SHIPPED
Start: 2020-12-02 | End: 2022-02-07

## 2020-12-02 ASSESSMENT — MIFFLIN-ST. JEOR: SCORE: 1042.88

## 2020-12-02 ASSESSMENT — PATIENT HEALTH QUESTIONNAIRE - PHQ9: SUM OF ALL RESPONSES TO PHQ QUESTIONS 1-9: 0

## 2020-12-03 LAB
25(OH)D3 SERPL-MCNC: 23.9 NG/ML (ref 30–80)
HCV AB SERPL QL IA: NEGATIVE

## 2021-05-26 ASSESSMENT — PATIENT HEALTH QUESTIONNAIRE - PHQ9
SUM OF ALL RESPONSES TO PHQ QUESTIONS 1-9: 0
SUM OF ALL RESPONSES TO PHQ QUESTIONS 1-9: 19

## 2021-05-27 ASSESSMENT — PATIENT HEALTH QUESTIONNAIRE - PHQ9
SUM OF ALL RESPONSES TO PHQ QUESTIONS 1-9: 14
SUM OF ALL RESPONSES TO PHQ QUESTIONS 1-9: 16

## 2021-05-27 NOTE — PROGRESS NOTES
Assessment/Plan:        Diagnoses and all orders for this visit:    Major depressive disorder with single episode, in partial remission (H) her medications are helping.  She is more depressed now because she is not receiving her-SSI income that she has been granted.  He has been dragging on for several months and she does not know when she was start getting the money.  Her medications are helping and she will continue with her current meds.  She sees her psychiatrist next week.  I recommended that she tell him that the sleeping medicine is not helping as well as she would like.    Hypothyroidism- she is taking her medication daily.  Her most recent TSH was within normal limits.-     levothyroxine (SYNTHROID, LEVOTHROID) 75 MCG tablet; Take 1 tablet (75 mcg total) by mouth daily.  Dispense: 90 tablet; Refill: 2    Fatigue she would like a refill on her multivitamin because she does feel that she has more energy when she takes those.multivitamin (ONE A DAY) per tablet; Take 1 tablet by mouth daily.  Dispense: 120 tablet; Refill: 2    Other chronic gastritis without hemorrhage--On omeprazole once daily.  She is not having any stomach problems.  She will continue to take that. -        I discussed again the importance of avoiding aspirin or Aleve or ibuprofen.    Vitamin D deficiency she will continue vitamin D daily.-  -     cholecalciferol, vitamin D3, (VITAMIN D3) 5,000 unit Tab; Take 1 tablet (5,000 Units total) by mouth daily. VITAMIN D3 5000 UNIT ORAL CAPSULE  Dispense: 100 tablet; Refill: 3    Lumbar back pain-chronic.  This is improved with her current medications.  Is not interfering with sleep.  She does not have any pain going down her legs.  She will continue with her current treatments.      Patient was seen today with the assistance of a professional .  She will return to clinic in 3 months for follow-up and sooner if any problems.    Subjective:    Patient ID: Betsy Negron is a 53 y.o.  female.    HPI:  Depression is worse because she still isn't getting any money from SSI.  Was approved last year in August, her daughter called in October and was told that it's still being processed.  Was told last year that she would be getting paid in October.  Pt's therapist recently called her  and he thinks that it's still not settled because she appealed for backpay.  This is causing the pt a lot of stress.  She does have secure housing and has enough food to eat.    Sleep is good with  medication.  Low energy during the day.      No side effects from any of her meds.  Omeprazole is helping with her stomach problems.  Appetite improved since her meds are stablized.          The following portions of the patient's history were reviewed and updated as appropriate: allergies, current medications, past family history, past medical history, past social history, past surgical history and problem list.    Review of Systems      12 system review negative other than HPI    Objective:    Physical Exam         Patient is in no apparent physical distress.  Vitals are as recorded.  Head and face are normal.  Conjunctiva are clear.  Neck is without adenopathy or masses.  Thyroid not enlarged.  Regular rate and rhythm with no murmurs.  Lungs are clear with good air movement bilaterally.  Abdomen soft, nontender, no organomegaly or masses.  Extremities are without edema.  Gait is normal.  Skin is without rashes.  Mood depressed, affect flat.

## 2021-05-28 NOTE — PROGRESS NOTES
ASSESMENT AND PLAN:  1. Chronic diarrhea of unknown origin  -  Loperamide is helping but still having occasional diarrhea despite taking meds.  Previous normal labs: C.dif, Ova/parasite, stool culture, Hepatic profile.    -  Pt has Tubular adenoma on Colonoscopy in 2015 and repeat due every 5 years.   -  Denies new or worsening sxs.    -  Will refer to GI for eval and treat.   Orders:   - Ambulatory referral to Gastroenterology     Reviewed Medical/Social history and Medications. No new changes.  Discussed indications for emergent medical attention and routine F/u.  Patient/Parent/Guardian engaged in decision making process and verbalized understanding of the options discussed and agreed with the final treatment plan.     SUBJECTIVE:  Betsy Negron is a 53 y.o. female who presents  for evaluation of her Diarrhea.    Pt with chronic diarrhea and on Loperamide and mostly controlled. Pt reports her stool is still loose at times despite taking loperamide.  Had normal labs (C.dif,stool culture, ova/parasite) last year, 7/2018.   Also had negative DIO, Sed Rate, Rheumatoid factor in 2017.     Denies fever/chills, wheezing, SOB, cough, significant weight loss, night sweats, CP, n/v, abdominal pain, diarrhea/constipation, hematochezia.     ROS:  Comprehensive Review of Systems Negative except stated in HPI.     Past Medical History:   Diagnosis Date     Depression      GERD (gastroesophageal reflux disease)      Hypothyroidism      Patient Active Problem List   Diagnosis     Hypothyroidism     Major depressive disorder, single episode     Colon polyp     Chronic gastritis     Rotator cuff tear, right     Vitamin D deficiency     Chronic bilateral low back pain without sciatica     Anxiety     Constipation, unspecified constipation type     History of cholecystectomy     Current Outpatient Medications   Medication Sig Dispense Refill     acetaminophen (TYLENOL) 500 MG tablet 2 tablets three times daily as needed for pain 100  "tablet 3     cholecalciferol, vitamin D3, (VITAMIN D3) 5,000 unit Tab Take 1 tablet (5,000 Units total) by mouth daily. VITAMIN D3 5000 UNIT ORAL CAPSULE 100 tablet 3     gabapentin (NEURONTIN) 300 MG capsule Take 1 capsule (300 mg total) by mouth 3 (three) times a day. 90 capsule 3     levothyroxine (SYNTHROID, LEVOTHROID) 75 MCG tablet Take 1 tablet (75 mcg total) by mouth daily. 90 tablet 2     multivitamin (ONE A DAY) per tablet Take 1 tablet by mouth daily. 120 tablet 2     omeprazole (PRILOSEC) 20 MG capsule Take 1 capsule (20 mg total) by mouth daily. 90 capsule 3     sertraline (ZOLOFT) 50 MG tablet Take 1 tablet by mouth daily.  5     traZODone (DESYREL) 50 MG tablet Take 1 tablet (50 mg total) by mouth at bedtime. 60 tablet 1     loratadine (CLARITIN) 10 mg tablet Take 1 tablet (10 mg total) by mouth daily. 30 tablet 11     No current facility-administered medications for this visit.      Social History     Tobacco Use   Smoking Status Never Smoker   Smokeless Tobacco Never Used     OBJECTIVE: /58   Pulse 96   Temp 97.8  F (36.6  C) (Oral)   Resp 16   Ht 4' 10.5\" (1.486 m)   Wt 121 lb (54.9 kg)   SpO2 96%   BMI 24.86 kg/m     No results found for this or any previous visit (from the past 24 hour(s)).    PHYSICAL:  General Alert, awake, not in acute distress.   HEENT:             -Head Atraumatic, normocephalic.            -Eyes PERRL, no erythema, discharge, conjunctiva clear.             -Ears TMs intact, no drainage, no erythema or edema.            -Nose    Nostrils patent,  no edema.            -Throat Oropharynx without edema, erythema.  Uvula midline, no deviation.             -Neck Neck FROM, no adenopathy.  Thyroid not visibly enlarged.   CV Normal S1 & S2. No murmurs.   RESP Non-labored, RRR, CTAB. No wheezes or crackles.    ABDOMEN Soft,non-tender. No rigidity, guarding.  Normal bowel sounds.    RECTAL No hemorrhoids, fecal impaction, masses/nodules.    NEURO Grossly intact, no focal " deficit. Sensation and Strength intact. Oriented x 3.  Gait normal.        Jami Cordova PA-C

## 2021-05-30 VITALS — BODY MASS INDEX: 25.4 KG/M2 | HEIGHT: 59 IN | WEIGHT: 126 LBS

## 2021-05-30 VITALS — BODY MASS INDEX: 25.49 KG/M2 | WEIGHT: 126.44 LBS | HEIGHT: 59 IN

## 2021-05-30 VITALS — HEIGHT: 59 IN | WEIGHT: 129 LBS | BODY MASS INDEX: 26 KG/M2
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
## 2021-05-31 VITALS — HEIGHT: 59 IN | BODY MASS INDEX: 24.8 KG/M2 | WEIGHT: 123 LBS

## 2021-05-31 VITALS — HEIGHT: 59 IN | BODY MASS INDEX: 25 KG/M2 | WEIGHT: 124 LBS

## 2021-05-31 VITALS — HEIGHT: 59 IN | WEIGHT: 124 LBS | BODY MASS INDEX: 25 KG/M2

## 2021-05-31 VITALS — BODY MASS INDEX: 25.32 KG/M2 | WEIGHT: 123.25 LBS

## 2021-06-01 VITALS — HEIGHT: 59 IN | WEIGHT: 122 LBS | BODY MASS INDEX: 24.6 KG/M2

## 2021-06-01 VITALS — WEIGHT: 127 LBS | BODY MASS INDEX: 25.6 KG/M2 | HEIGHT: 59 IN

## 2021-06-01 VITALS — WEIGHT: 120.25 LBS | BODY MASS INDEX: 24.24 KG/M2 | HEIGHT: 59 IN

## 2021-06-01 VITALS — HEIGHT: 59 IN | WEIGHT: 119.5 LBS | BODY MASS INDEX: 24.09 KG/M2

## 2021-06-01 VITALS — BODY MASS INDEX: 23.94 KG/M2 | WEIGHT: 118.75 LBS | HEIGHT: 59 IN

## 2021-06-01 NOTE — PROGRESS NOTES
Assessment/Plan:        Diagnoses and all orders for this visit:    Major depressive disorder with single episode, in partial remission (H)- chronic, no suicidal thoughts.  Continue current meds.   -     PHQ9 Depression Screen    Loose stools- she alternates between constipation and loose stools.  I explained to her that it is not uncommon to have loose stools, ethel. After eating greasy foods, because she has had her gallbladder removed.  Anxiety may also be contributing.  Explained that it is important to drink lots of liquids when she is taking the fiber so she doesn't get constipation.  -     polyethylene glycol (GLYCOLAX) 17 gram/dose powder; Take 17 g by mouth daily. In a glass of water or orange juice  Dispense: 255 g; Refill: 2    Acquired hypothyroidism- TSH in nl range, no signs or sx's of hypo or hyper thyroidism. Continue current levothyroxine dose.     Other chronic gastritis without hemorrhage-stable, continue current meds.     Chronic bilateral low back pain without sciatica  Stable, continue current meds.   Anxiety  meds help some and she will continue with those.   History of cholecystectomy    40 min visit, over 50% spent in education and counseling about the above issues.  Professional  used for this visit.     RTC 3 months for follow up, sooner prn.           Subjective:    Patient ID: Betsy Negron is a 54 y.o. female.    HPI:  Has low back pain, is worse when she gets up in the morning and gets better as the vicky goes on.  Pain is not getting worse.  Thinks it's because sh'es getting old.  Pain dos no interfere with her sleep.     Stomach makes sounds an that worries her because of people she has known with other conditions.  Makes more noise after she eats.  Is worried that she might have cancer.  Normal stools. Has to go to the bathroom after she eats, stools are somewhat loose.  Has never taken fiber supplement because the GI doc told her she would have to buy it over the counter and  she doesn't have money for that.   Has rumbling noise in her stomach after she drinks water.  Is afraid that she has colon cancer.omeprazole doesn't help.      Is still having financial difficulties and stress, making her depression worse, because she was accepted for SS disability a year ago and hasn't gotten any funds yet.  She has checked with SS a couple times and still hasn't gotten her money.  Now has her  going to look into it.      The following portions of the patient's history were reviewed and updated as appropriate: allergies, current medications, past family history, past medical history, past social history, past surgical history and problem list.    Review of Systems      12 sys rev neg other than HPI    Objective:    Physical Exam   Patient is in no apparent physical distress.  Vitals are as recorded.  Head and face are normal.  Conjunctiva are clear.  Neck is without adenopathy or masses.  Cardiovascular :  Regular rate and rhythm with no murmurs.  Lungs are clear with good air movement bilaterally.Abdomen soft, NT, no organomegaly or masses.   Extremities are without edema.  Gait is normal.  Skin is without rashes.  Mood anxious, appears depressed.

## 2021-06-01 NOTE — PROGRESS NOTES
Assessment/Plan:        Diagnoses and all orders for this visit:    Major depressive disorder with single episode, in partial remission (H)- improved with meds.  Continue current treatment.     Abdominal pain, left lower quadrant- intermittent, no associated sx's.  She has a hx of colon polyps, UTI and gastritis; sx's not consistent with any of those dx.  Nl abd exam.  Will follow up with pt when results of imaging available.   -     US Pelvis With Transvaginal Non OB; Future; Expected date: 09/16/2019    Plantar warts- treated on both feet today.  Will recheck at her appt in 2 weeks and retreat if necessary.     Anxiety- chronic, stable.  Current meds are helping her and she will continue with those.     Chronic bilateral low back pain without sciatica- improved with current meds, continue.     Other chronic gastritis without hemorrhage- asymptomatic, continue current treatment.  Pt avoids foods that make her sx's worse.     Acquired hypothyroidism= TSH nl in Jan 2019, no signs or sx's of imbalance.  Continue current levothyroxine dose.          Subjective:    Patient ID: Betsy Negron is a 54 y.o. female.    HPI: Pt has been having mild sharp pain in her left lower abdomen for the past couple weeks off and on.  Happens randomly day or night, not ass. With eating. Pain is not severe. Feels swollen if she presses on that part of her abdomen.  No diarrhea, constipation, vomiting, reflux, fever.  No vag discharge or bleeding.      No upper abdominal pain, depression and sleep are OK with her meds.  No thoughts of wanting to harm herself or others.  No side effects of any of her meds.     Has warts on the bottoms of both feet that are painful and she would like them frozen.  Had them treated once a long time ago and they didn't completley go away.     The following portions of the patient's history were reviewed and updated as appropriate: allergies, current medications, past family history, past medical history, past  social history, past surgical history and problem list.      Vitals:    09/16/19 1516   BP: 94/62   Pulse: (!) 56   Resp: 16   Temp: 98.1  F (36.7  C)     Review of Systems  12 sys rev neg other than HPI        Objective:    Physical Exam       Patient is in no apparent physical distress.  Vitals are as recorded.  Head and face are normal.  Conjunctiva are clear.  Neck is without adenopathy or masses.  Cardiovascular :  Regular rate and rhythm with no murmurs.  Lungs are clear with good air movement bilaterally.  Extremities are without edema.  Gait is normal.  Skin is without rashes. Wart on ball of foot bilat.  Psych:  Appears anxious.       Procedure Note:  One wart on ball of left foot and one on right foot treated with 4 freeze-thaw cycles of liquid nitrogen.  Pt tolerated procedure well, no complications.

## 2021-06-02 VITALS — WEIGHT: 122 LBS | HEIGHT: 59 IN | BODY MASS INDEX: 24.6 KG/M2

## 2021-06-02 VITALS — WEIGHT: 120.12 LBS | BODY MASS INDEX: 24.22 KG/M2 | HEIGHT: 59 IN

## 2021-06-02 VITALS — HEIGHT: 59 IN | BODY MASS INDEX: 24.22 KG/M2 | WEIGHT: 120.12 LBS

## 2021-06-02 VITALS — HEIGHT: 59 IN | WEIGHT: 122 LBS | BODY MASS INDEX: 24.6 KG/M2

## 2021-06-02 VITALS — HEIGHT: 59 IN | WEIGHT: 124 LBS | BODY MASS INDEX: 25 KG/M2

## 2021-06-02 VITALS — HEIGHT: 59 IN | BODY MASS INDEX: 24.49 KG/M2 | WEIGHT: 121.5 LBS

## 2021-06-03 VITALS — HEIGHT: 59 IN | WEIGHT: 121 LBS | BODY MASS INDEX: 24.39 KG/M2

## 2021-06-03 VITALS
WEIGHT: 122 LBS | BODY MASS INDEX: 24.6 KG/M2 | HEART RATE: 56 BPM | TEMPERATURE: 98.1 F | HEIGHT: 59 IN | SYSTOLIC BLOOD PRESSURE: 94 MMHG | RESPIRATION RATE: 16 BRPM | DIASTOLIC BLOOD PRESSURE: 62 MMHG

## 2021-06-03 VITALS
BODY MASS INDEX: 24.19 KG/M2 | HEART RATE: 68 BPM | TEMPERATURE: 98 F | RESPIRATION RATE: 16 BRPM | DIASTOLIC BLOOD PRESSURE: 58 MMHG | HEIGHT: 59 IN | SYSTOLIC BLOOD PRESSURE: 90 MMHG | WEIGHT: 120 LBS

## 2021-06-03 VITALS — HEIGHT: 59 IN | BODY MASS INDEX: 24.39 KG/M2 | WEIGHT: 121 LBS

## 2021-06-04 VITALS
OXYGEN SATURATION: 98 % | RESPIRATION RATE: 18 BRPM | BODY MASS INDEX: 24.24 KG/M2 | HEIGHT: 59 IN | DIASTOLIC BLOOD PRESSURE: 60 MMHG | WEIGHT: 120.25 LBS | HEART RATE: 56 BPM | TEMPERATURE: 97.7 F | SYSTOLIC BLOOD PRESSURE: 86 MMHG

## 2021-06-04 VITALS
BODY MASS INDEX: 24.75 KG/M2 | HEIGHT: 59 IN | WEIGHT: 122.75 LBS | DIASTOLIC BLOOD PRESSURE: 60 MMHG | TEMPERATURE: 98.1 F | SYSTOLIC BLOOD PRESSURE: 90 MMHG | RESPIRATION RATE: 20 BRPM | HEART RATE: 69 BPM | OXYGEN SATURATION: 94 %

## 2021-06-05 VITALS
DIASTOLIC BLOOD PRESSURE: 66 MMHG | WEIGHT: 123.5 LBS | RESPIRATION RATE: 18 BRPM | OXYGEN SATURATION: 98 % | TEMPERATURE: 97.7 F | HEIGHT: 59 IN | HEART RATE: 55 BPM | BODY MASS INDEX: 24.9 KG/M2 | SYSTOLIC BLOOD PRESSURE: 98 MMHG

## 2021-06-05 VITALS
OXYGEN SATURATION: 97 % | HEIGHT: 59 IN | SYSTOLIC BLOOD PRESSURE: 92 MMHG | TEMPERATURE: 98.5 F | BODY MASS INDEX: 24.8 KG/M2 | WEIGHT: 123 LBS | DIASTOLIC BLOOD PRESSURE: 60 MMHG | RESPIRATION RATE: 20 BRPM | HEART RATE: 55 BPM

## 2021-06-05 NOTE — PROGRESS NOTES
Assessment/Plan:        Diagnoses and all orders for this visit:    Major depressive disorder with single episode, in partial remission (H)- worse in light of the recent death of her brother. She has lost 4 family members in the past 8 months, all in [place].Does have a support structure.   Discussed grieving process and offered referral to a therapist if she would like to talk to somebody.  She will let me know if she would like to do that.  Continue current meds, RTC e months for follow up , sooner prn.   -     sertraline (ZOLOFT) 50 MG tablet; Take 1 tablet (50 mg total) by mouth daily.  Dispense: 90 tablet; Refill: 3    Psychophysiological insomnia- medication is working well- continue.   -     traZODone (DESYREL) 50 MG tablet; Take 1 tablet (50 mg total) by mouth at bedtime.  Dispense: 90 tablet; Refill: 3    Chronic midline low back pain with right-sided sciatica- controlled with medication - continue current treatment.   -     acetaminophen (TYLENOL) 500 MG tablet; 2 tablets three times daily as needed for pain  Dispense: 100 tablet; Refill: 3    Hypothyroidism- Due for TSH check at next visit.  Will do other routine labs then, also.  No sx's of hypo/hyperthyroidism.   -     levothyroxine (SYNTHROID, LEVOTHROID) 75 MCG tablet; Take 1 tablet (75 mcg total) by mouth daily.  Dispense: 90 tablet; Refill: 2    Muscle pain- generalized.  Likely due to increased stress, poor sleep,not much physical activity in winter.   -     menthol Gel; Apply to sore muscles three times daily as needed  Dispense: 227 g; Refill: 6    Vitamin D deficiency- continue daily supplement.   -     cholecalciferol, vitamin D3, (VITAMIN D3) 5,000 unit Tab; Take 1 tablet (5,000 Units total) by mouth daily. VITAMIN D3 5000 UNIT ORAL CAPSULE  Dispense: 100 tablet; Refill: 3    Fever- subjective only.  Discussed with her that feeling hot and cold may be due to her grief and not eating normally.  Inf test neg.  -     Influenza A/B Rapid Test- Nasal  Swab    40 min visit, over 50 percent spent in education and counseling about the above.         Subjective:    Patient ID: Betsy Negron is a 54 y.o. female.    HPI:  Pt is more depressed than usual.  Her brother who was in his thirties  last week in [facility] of liver cancer.  He had recently been appointed as a weiss and the patients thinks that someone may have poisoned him. She is very sad because her parents  last year in [facility] and and another brother in [facility] was murdered last year.   She feels like there is some kind of curse on their family and thinks that they need to have a shaman ceremony so it will stop. She has 2 sisters and one brother, all in [facility].      Her appetite is poor and she feels like she is losing weight.  She feels chills and like she has a fever for the past couple days.  Hasn't been sleeping well.  Does have cousins in [place] that she talks to when she is sad- she can discuss her feelings with them.    Meds are working well for her- she has no side effects that she is aware of.  Not having any stomach pain or heartburn. Constipation controlled with meds.      The following portions of the patient's history were reviewed and updated as appropriate: allergies, current medications, past family history, past medical history, past social history, past surgical history and problem list.    Review of Systems      12 sys rev neg other than HPI    Objective:    Physical Exam       Patient is in no apparent physical distress.  Vitals are as recorded.  Head and face are normal.  Conjunctiva are clear. Pharynx moist, without erythema, edema, exudate.  Neck is without adenopathy or masses.thyroid not enlarged.   Cardiovascular :  Regular rate and rhythm with no murmurs.  Lungs are clear with good air movement bilaterally. Abd soft, NT, no organomegaly or masses.  Extremities are without edema.  Gait is normal.  Skin is without rashes.  Mood depressed, affect flat.

## 2021-06-07 ENCOUNTER — RECORDS - HEALTHEAST (OUTPATIENT)
Dept: ADMINISTRATIVE | Facility: OTHER | Age: 56
End: 2021-06-07

## 2021-06-07 DIAGNOSIS — E03.9 HYPOTHYROIDISM: ICD-10-CM

## 2021-06-07 RX ORDER — LEVOTHYROXINE SODIUM 75 UG/1
TABLET ORAL
Qty: 90 TABLET | Refills: 2 | Status: SHIPPED | OUTPATIENT
Start: 2021-06-07 | End: 2022-07-11

## 2021-06-07 NOTE — PROGRESS NOTES
"Betsy Negron is a 54 y.o. female who is being evaluated via a billable telephone visit.      The patient has been notified of following:     \"This telephone visit will be conducted via a call between you and your physician/provider. We have found that certain health care needs can be provided without the need for a physical exam.  This service lets us provide the care you need with a short phone conversation.  If a prescription is necessary we can send it directly to your pharmacy.  If lab work is needed we can place an order for that and you can then stop by our lab to have the test done at a later time.    If during the course of the call the physician/provider feels a telephone visit is not appropriate, you will not be charged for this service.\"     Patient has given verbal consent to a Telephone visit? Yes    Betsy Negron complains of    Chief Complaint   Patient presents with     Follow-up     thyroid,depression, heel pain( was treated by  )      Bodyache     wants lab work        I have reviewed and updated the patient's Past Medical History, Social History, Family History and Medication List.    ALLERGIES  Patient has no known allergies.    Additional provider notes: Last seen by PCP ~ one month ago.  C/O bilateral leg pain, wants \" blood test for this\". She was on gabapentin in the past, but not taking it currently.  No acute fever or URI symptoms. No known contact with COVID 19 case.  Still having trouble sleeping at night.   Still dealing with grief. 3 family members passed away last within a year.  Lives with 2 adult children.  Denied suicidal or homicidal ideations.       Assessment/Plan:  1. Pain in both lower legs  Restart neurontin.  - gabapentin (NEURONTIN) 300 MG capsule; Take 1 capsule (300 mg total) by mouth 3 (three) times a day.  Dispense: 90 capsule; Refill: 3    2. Major depressive disorder with single episode, in partial remission (H)  - Ambulatory referral to Psychiatry  Referred for " psychotherapy, unclear if televisit is available. Will check with staff.     3. Grief reaction  As above.  - Ambulatory referral to Psychiatry      Phone call duration: 22  Minutes    Dr. Franc Wetzel  4/7/2020 10:52 AM

## 2021-06-07 NOTE — TELEPHONE ENCOUNTER
Called patient left message to call clinic back. Patient has appointment with  on May 14 unfortunately  will not be avalaible for telephone visit will need to reschedule patient during the week of May 4- May or May 18-May 22 thank you

## 2021-06-07 NOTE — TELEPHONE ENCOUNTER
Dr. Wetzel referred pt today for psychotherapy for depression and grief.  Please check to see if they can do it online please.  Thanks.

## 2021-06-07 NOTE — TELEPHONE ENCOUNTER
Spoke w/ pt. Televised visit scheduled for:    Apr 10, 2020  9:00 AM CDT   (Arrive by 8:45 AM)   Telephone Visit with JORDEN Lopez   [facility]  [address] [place] [id number] [phone]

## 2021-06-07 NOTE — PROGRESS NOTES
"Betsy Negron is a 54 y.o. female who is being evaluated via a billable telephone visit.      The patient has been notified of following:     \"This telephone visit will be conducted via a call between you and your physician/provider. We have found that certain health care needs can be provided without the need for a physical exam.  This service lets us provide the care you need with a short phone conversation.  If a prescription is necessary we can send it directly to your pharmacy.  If lab work is needed we can place an order for that and you can then stop by our lab to have the test done at a later time.    Telephone visits are billed at different rates depending on your insurance coverage. During this emergency period, for some insurers they may be billed the same as an in-person visit.  Please reach out to your insurance provider with any questions.    If during the course of the call the physician/provider feels a telephone visit is not appropriate, you will not be charged for this service.\"    Patient has given verbal consent to a Telephone visit? Yes        Additional provider notes: The patient had phone visit last week.  Neurontin was restarted for leg pain.  She was referred for psychotherapy.  Appointment was scheduled for 4 days ago.  Note reviewed in chart.  Patient states she already had a psychiatrist at [place], wants to continue with her current psychiatrist.  She has been seeing the psychiatrist for more than 4 years, states he knows her well.    She is taking Neurontin 300 mg twice a day for leg pain.  States the pain is improving.  No medication side effects reported.    Assessment/Plan:  1. Pain in both lower legs  Restarted Neurontin a week ago, improving.  She is taking 300 mg twice a day, advised to take 3 times a day if needed.    2. Major depressive disorder with single episode, in partial remission (H)  She will follow-up with her psychiatrist.  She will contact her  to make " follow-up appointment.    3. Grief reaction  Patient declined psychotherapy from [facility].  She will continue to see her psychiatrist.  Denied suicidal or homicidal ideations.    This transcription uses voice recognition software, which may contain typographical errors.          Phone call duration:  7  minutes    Dr. Franc Wetzel  4/14/2020 12:38 PM

## 2021-06-07 NOTE — TELEPHONE ENCOUNTER
"Pt denied starting psychotherapy with new provider. Said she would like to return to previous therapist in [place] - \"they already know me well\".  "

## 2021-06-08 NOTE — PROGRESS NOTES
[facility]   Shoulder Initial Evaluation    Patient Name: Betsy Negron  Date of evaluation: 2/3/2017  Referral Diagnosis: Rotator cuff injury, right, subsequent encounter  Referring provider: Galina Reynolds MD  Visit Diagnosis:     ICD-10-CM    1. Right shoulder injury, subsequent encounter S49.91XD        Assessment:      Betsy Negron is a 51 y.o. female with PMH significant for hypothyroidism, GERD, depression who presents to therapy today with chief complaints of right shoulder pain.  Pt sx began 1-2 years ago, she is not sure why but thinks she was pulling on some boxes.  She reports she had an MRI and Physical Therapy at the [facility] a year ago with no improvement.  The MRI showed a tear, and per Dr. Reynolds's notes the patient did not want to pursue surgery.  Today she reports she thought it would heal on it's own and it has not.  I recommended if she is concerned about this and would like to discuss surgical options please contact Dr. Reynolds again.  Upon exam pt demonstrates limited right shoulder ROM with pain, + special tests indicative of a rotator cuff tear.  Pt is most functionally limited with reaching, doing her hair, lifting/carrying, cleaning/cooking with her R arm.  Pt would benefit from skilled PT to increase strength of agonist muscles, pain management/home self-care, improve mobility and function of RUE.     MRI from 2/8/16:  1. Small focal anterior right supraspinatus footprint full-thickness rotator cuff tear without significant medial myotendinous junctional retraction or supraspinatus muscle atrophy.    Goals:  Pt. will demonstrate/verbalize independence in self-management of condition in : 4 weeks  Pt. will be independent with home exercise program in : 4 weeks  Pt will: reach back ROM R=L in 6-8 weeks to be able to scratch her back or tie her hair  Pt will: tolerate carrying small objects like groceries or laundry with minimal sx in 6-8 weeks    Patient's expectations/goals are  realistic.    Barriers to Learning or Achieving Goals:  Chronicity of problem, poor results with previous PT, Language barrier       Plan / Patient Instructions:        Plan of Care:   Communication with: Referral Source  Patient Related Instruction: Nature of Condition;Body mechanics;Treatment plan and rationale;Posture;Self Care instruction;Basis of treatment  Times per Week: 2  Number of Weeks: 6  Number of Visits: 12  Discharge Planning: Indep with HEP, progressing towards goals or plateau of progress  Therapeutic Exercise: ROM;Stretching;Strengthening  Neuromuscular Reeducation: kinesio tape;posture  Manual Therapy: soft tissue mobilization;myofascial release;joint mobilization;muscle energy  Modalities: other  Modalities: PRN, limited    POC and pathology of condition were reviewed with patient.  Pt. is in agreement with the Plan of Care  Plan for next visit: JANAE CRUZ shoulder, Begin home strengthening program as able, may provide ice or US at the end of session  .   Subjective:       Social information:   Living Situation:    Occupation: NA    History of Present Illness:    Betsy is a 51 y.o. female who presents to therapy today with complaints of right shoulder pain. Date of onset/duration of symptoms is a year or two ago. She recalls pulling a box and it suddenly hurt.  Onset was gradual. Symptoms are constant and getting worse. She denies history of similar symptoms.  She describes their previous level of function as not limited.    Pain Ratin  Pain rating at best: 5 rest  Pain rating at worst: 8 reaching  Pain description: burning, localized pain to anterior shoulder    Functional limitations are described as occurring with:   Tieing hair, scratching her back  Reaching  Lifting    Patient reports benefit from medication.  No benefit from previous therapy, injections.           Objective:      Note: Items left blank indicates the item was not performed or not indicated at the time of the  evaluation.    Shoulder Examination  1. Right shoulder injury, subsequent encounter       Involved side: Right  Posture Observation:      General sitting posture is  fair.  Cervical Clearing: Normal    Shoulder/Elbow ROM    Date: 2/3/17     Shoulder and Elbow ROM ( )   AROM in degrees AROM in degrees AROM in degrees    Right Left Right Left Right Left   Shoulder Flexion (0-180 ) 115 135       Shoulder Abduction (0-180 ) 80 110       Shoulder Extension (0-60 ) 60 60       Shoulder ER (0-90 )         Shoulder ER functional T2 Top of shoulder       Shoulder IR/Ext L3 T10       Elbow Flexion (150 ) WNL WNL       Elbow Extension (0 ) WNL WNL        PROM in degrees PROM in degrees PROM in degrees    Right Left Right Left Right Left   Shoulder Flexion (0-180 )         Shoulder Abduction (0-180 )         Shoulder Extension (0-60 )         Shoulder ER (0-90 )         Shoulder IR (0-70 )         Elbow Flexion (150 )         Elbow Extension (0 )           Shoulder/Elbow Strength   Date: 2/3/17     Shoulder/Elbow Strength (/5)  Manual Muscle Test (MMT) MMT MMT MMT    Right Left Right Left Right Left   Shoulder Flexion 4 5       Supraspinatus         Shoulder Abduction 4 5       Shoulder Extension         Shoulder External Rotation 4+ 5       Shoulder Internal Rotation 5- 5       Elbow Flexion 4+ 5       Elbow Extension 5 5       Other:         Other:           Palpation: Pain from superior edge of shoulder, AC joint, deltoid up through the right side of the neck    Joint Assessment:  Sternoclavicular Joint Assessment: WNL  Acromioclavicular Joint Assessment: WNL  Scapulothoracic Joint Assessment: WNL.  Glenohumeral Joint Assessment:Hypomobile.    Shoulder Special Tests     Impingement Cluster Right (+/-) Left (+/-) Rotator Cuff Tests Right (+/-) Left (+/-)   [employer] -  Drop Arm Sign +    Painful Arc +  Hornblowers     Infraspinatus Test   ERLS +    AC Tests Right (+/-) Left (+/-) IRLS -    Active Compression   Labral  Tests Right (+/-) Left (+/-)   Crossbody Adduction -  Biceps Load Test II     AC Resisted Extension -  Jerk Test     GH Instability Tests Right (+/-) Left (+/-) Binta Test     Sulcus Sign + and painful  Biceps Tests Right (+/-) Left (+/-)   Apprehension   Speed -    Relocation   Shu allison -    Other:   Other:     Other:   Other:         Treatment Today    TREATMENT MINUTES COMMENTS   Evaluation 30    Self-care/ Home management     Manual therapy 10 Supine:  - STM right upper trapezius, deltoid  - Scapulo-thoracic ROM   Neuromuscular Re-education     Therapeutic Activity     Therapeutic Exercises     Gait training     Modality__________________                Total 40    Blank areas are intentional and mean the treatment did not include these items.     PT Evaluation Code: (Please list factors)  Patient History/Comorbidities:  hypothyroidism, GERD, depression   Examination: Decreased R shoulder ROM, + special tests  Clinical Presentation: Stable  Clinical Decision Making: Low    Patient History/  Comorbidities Examination  (body structures and functions, activity limitations, and/or participation restrictions) Clinical Presentation Clinical Decision Making (Complexity)   No documented Comorbidities or personal factors 1-2 Elements Stable and/or uncomplicated Low   1-2 documented comorbidities or personal factor 3 Elements Evolving clinical presentation with changing characteristics Moderate   3-4 documented comorbidities or personal factors 4 or more Unstable and unpredictable High                Tonya Alvarenga  2/3/2017  8:04 AM

## 2021-06-08 NOTE — PROGRESS NOTES
"[facility] Daily Progress     Patient Name: Betsy Negron  Date: 2/16/2017  Visit #: 4  PTA visit #:  -  Referral Diagnosis:   Referring provider: Galina Reynolds MD  Visit Diagnosis:     ICD-10-CM    1. Right shoulder injury, subsequent encounter S49.91XD    2. Rotator cuff injury, right, subsequent encounter S46.001D          Assessment:     Pt had difficulty with any resistance exercise.  She is tolerating her exercises better and can lift a water bottle overhead.  Improved shoulder flexion per goniometer.  Continued to progress AAROM.  Pt continues to tolerate manual therapy better.    Goal Status:  Pt. will demonstrate/verbalize independence in self-management of condition in : 4 weeks  Pt. will be independent with home exercise program in : 4 weeks  Pt will: reach back ROM R=L in 6-8 weeks to be able to scratch her back or tie her hair  Pt will: tolerate carrying small objects like groceries or laundry with minimal sx in 6-8 weeks    Plan / Patient Education:     Continue with initial plan of care.  Progress with home program as tolerated.   Next visit: Trial thoracic joint mobs and scapulothoracic PROM, progress shoulder flexion as able    Subjective:     Pain Rating:   KT seemed to help.  Her shoulder felt like \"it wasn't moving all over the place\" per .      Objective:     Exercises:  Exercise #1: AAROM wand flexion supine  Comment #1: 10x  Exercise #2: No weight punch with L arm assist  Comment #2: 10x  Exercise #3: Table slides forward/side  Comment #3: 10x each    Previous R shoulder ROM:  Flexion 115  Abduction 80  IR L3  After session today:  Flexion 130  Abduction 85  IR L3    Treatment Today     TREATMENT MINUTES COMMENTS   Evaluation     Self-care/ Home management     Manual therapy 19 Supine:  - STM right deltoid/supraspinatus/trapezius  - Right posterior GH mobilizations   Neuromuscular Re-education  Tape R shoulder \"I\" strips inferior/superior GH joint and " anterior/posterior along the spine of scapula   Therapeutic Activity     Therapeutic Exercises 10 See above   Gait training     Modality__________________                Total 29    Blank areas are intentional and mean the treatment did not include these items.       Tonya Alvarenga  2/16/2017

## 2021-06-08 NOTE — PROGRESS NOTES
"[facility] Daily Progress     Patient Name: Betsy Negron  Date: 2017  Visit #: 2  PTA visit #:  -  Referral Diagnosis:   Referring provider: Galina Reynolds MD  Visit Diagnosis:     ICD-10-CM    1. Right shoulder injury, subsequent encounter S49.91XD    2. Rotator cuff injury, right, subsequent encounter S46.001D          Assessment:     Pt had difficulty with any resistance exercise.  She had some pain with AAROM but was able to tolerate light activity.  Manual therapy increased pt sx, though pt demonstrates significant pec tightness and posterior capsule tightness.      Goal Status:  Pt. will demonstrate/verbalize independence in self-management of condition in : 4 weeks  Pt. will be independent with home exercise program in : 4 weeks  Pt will: reach back ROM R=L in 6-8 weeks to be able to scratch her back or tie her hair  Pt will: tolerate carrying small objects like groceries or laundry with minimal sx in 6-8 weeks    Plan / Patient Education:     Continue with initial plan of care.  Progress with home program as tolerated.   Next visit: Continue KT, exercise progression as tolerated.    Subjective:     Pain Ratin  Sore.  No change.  Had ultrasound from her chiropractor which seemed to help.      Objective:     Exercises:  Exercise #1: AAROM wand flexion supine  Comment #1: 10x  Exercise #2: 1# weight punch  Comment #2: 10x    \"I\" strips right shoulder    Treatment Today     TREATMENT MINUTES COMMENTS   Evaluation     Self-care/ Home management     Manual therapy 10 Right shoulder posterior glides supine Gr 3   Neuromuscular Re-education     Therapeutic Activity     Therapeutic Exercises 16 See above   Gait training     Modality__________________                Total 26    Blank areas are intentional and mean the treatment did not include these items.       Tonya Alvarenga  2017    "

## 2021-06-08 NOTE — PROGRESS NOTES
.      ASSESSMENT:  1. Rotator cuff injury, right, subsequent encounter-read reviewed again that she does have a tear in her rotator cuff as well as tendinitis.  The orthopedist recommended that she have no more steroid injections.  She has not gone through a course of physical therapy.  She currently is on no medications for pain or inflammation.  I started her on meloxicam 7.5 mg twice daily.  I recommended to her that it's important to take this with food to prevent stomach problems.  She also will continue going to the chiropractor as that does seem to help her.  She had questions about whether the tear will heal by itself.  I explained that since it has been there a long time and is a full-thickness tear that that is highly unlikely.  I explained that surgery is probably the only way to fix it and there are no guarantees that even with surgery her pain and range of motion would return to normal.  She is not interested in surgery at this point.  I explained to her that with physical therapy, and the key to getting better is doing exercises at home as instructed.  I explained to her that if she doesn't do the exercises at home, the physical therapy will not be beneficial.  I also explained that with physical therapy, her pain may get worse before it gets better because she is working muscles in a new way.  - Ambulatory referral to Physical Therapy    2. Vitamin D deficiency-she currently does not have vitamin D.  A recent prescription to her pharmacy.  I explained to her that low vitamin D can make chronic pain worse.    3. Major Depression, Single Episode-improved with her current medications.  She will continue to take her medications as prescribed and follow-up with Cleo Mendoza on February 28.      4. Constipation, unspecified constipation type-currently not having any problems with this.  She'll continue to drink lots of liquids.  I recommended 5-6 glasses of water a day.  She has been drinking much water  because she doesn't feel thirsty.  I told her will help with her muscle spasms as well as constipation and overall health.     5. Hypothyroidism, unspecified type-she will continue with her levothyroxine daily.    6. Chronic bilateral low back pain without sciatica-hopefully, the meloxicam will help with this as well as her shoulder pain.     The chiropractor told her her blood pressure is low and she needs to drink more water.  I explained that her blood pressure is always low and that this is her normal.    She will return to see me in 6 weeks and sooner if needed.    Orders Placed This Encounter   Procedures     Ambulatory referral to Physical Therapy     Referral Priority:   Routine     Referral Type:   Physical Therapy     Referral Reason:   Evaluation and Treatment     Requested Specialty:   Physical Therapy     Number of Visits Requested:   1     Medications Discontinued During This Encounter   Medication Reason     cholecalciferol, vitamin D3, 5,000 unit capsule Duplicate order     senna-docusate (PERICOLACE) 8.6-50 mg tablet      traMADol (ULTRAM) 50 mg tablet Therapy completed     cyclobenzaprine (FLEXERIL) 10 MG tablet      hydrOXYzine (ATARAX) 25 MG tablet Therapy completed     LORazepam (ATIVAN) 0.5 MG tablet      oxyCODONE-acetaminophen (PERCOCET) 5-325 mg per tablet Therapy completed     cholecalciferol, vitamin D3, (VITAMIN D3) 5,000 unit Tab Reorder     diclofenac sodium 3 % Gel          CHIEF COMPLAINT:  Chief Complaint   Patient presents with     Back Pain     Shoulder Pain       HISTORY OF PRESENT ILLNESS:  Betsy is a 51 y.o. female presenting to the clinic today for  back and shoulder pain.     Right Shoulder Pain: She has been well overall, but has been experiencing shoulder and back pain. The pain in her shoulder is a constant pain and it is worse near her joint. The pain reduces her range of motion- it's difficult for her to put her arm behind her back or over her head.  She has been  "experiencing the shoulder pain for a while and it has worsened since her last visit. She has also been experiencing muscle spasms in her shoulder. Yesterday she went to the chiropractor and had her shoulder adjusted. She has worse pain after the adjustment; pain is less today.    . The cortisone shot that she had helped with the pain for about a year and the second one lasted for 6 to 7 months. After her second injection wore off she went to the orthopedist and was given a third injection which did not help compared to the first or second injection. She was advised by the orthopedist to not have further injections. Has not had PT,  is willing to try it. She is concerned about the tear in her shoulder and is afraid about having surgery because she knows someone who had shoulder surgery and her pain got a little better but her arm got smaller.       Her low  back pain tends to be worse in the morning and tends to improve by the end of the day. She will occasionally experience muscle spasms in her lower back, no numbness, pain or tingling in her legs.     Depression: Her depression has improved. She had bad thoughts before, but now it is more \"numb\". Her sleep has improved with the medication. Sees Cleo Mendoza for her mental health meds, has an appt with her Feb 28.    Health Maintenance: She is due for a mammogram.     REVIEW OF SYSTEMS:   .he takes thyroid, depression, and sleeping medications. All other systems are negative.      TOBACCO USE:  History   Smoking Status     Never Smoker   Smokeless Tobacco     Never Used       VITALS:  Vitals:    01/31/17 0750   BP: 96/60   Pulse: (!) 52   Resp: 16   Temp: 97.6  F (36.4  C)   TempSrc: Oral   Weight: 126 lb (57.2 kg)   Height: 4' 10.5\" (1.486 m)     Wt Readings from Last 3 Encounters:   01/31/17 126 lb (57.2 kg)   12/02/16 127 lb 6.4 oz (57.8 kg)   11/29/16 123 lb 8 oz (56 kg)     Body mass index is 25.89 kg/(m^2).    PHYSICAL EXAM:   General Appearance: Alert, " cooperative, no distress, appears stated age.  Head: Normocephalic, without obvious abnormality, atraumatic  Eyes: , conjunctiva clear  Back: Symmetric, normal curvature, ROM normal, non-tender.   Lungs: Clear to auscultation bilaterally, respirations unlabored.  Heart: Regular rate and rhythm, S1 and S2 normal, no murmur  Extremities: Extremities normal, atraumatic, no cyanosis or edema,  Musculoskeletal: Tenderness over t lateral and anterior right shoulder.  abduction of  right shoulder was limited to 90 degrees, strength in the right shoulder was 4/5 and left 5/5. Unable to put her right arm behind her back, normal on left.   Skin: Skin color, texture, turgor normal, no rashes or lesions  Neurologic:gait normal, DTR's nl bilat LE's.  Psych: Mood and affect appropriate.     ADDITIONAL HISTORY SUMMARIZED (2): Reviewed [facility] note from 7/29/16 regarding right shoulder pain.   DECISION TO OBTAIN EXTRA INFORMATION (1): None.   RADIOLOGY TESTS (1): Reviewed MR of Right Shoulder from 2/8/16. Reviewed XR of Lumbar Spine from 12/2/16.   LABS (1): Reviewed labs from 7/22/16.   MEDICINE TESTS (1): None.  INDEPENDENT REVIEW (2 each): None.     The visit lasted a total of 29 minutes face to face with the patient. Over 50% of the time was spent counseling and educating the patient about right shoulder pain.    I, Zita Terrell, am scribing for and in the presence of, Dr. Reynolds.    I, Dr. Reynolds, personally performed the services described in this documentation, as scribed by Zita Terrell in my presence, and it is both accurate and complete.    MEDICATIONS:  Current Outpatient Prescriptions   Medication Sig Dispense Refill     levothyroxine (SYNTHROID, LEVOTHROID) 75 MCG tablet TAKE 1 TABLET BY MOUTH EVERY DAY 90 tablet 1     omeprazole (PRILOSEC) 20 MG capsule Take 1 capsule (20 mg total) by mouth daily. 90 capsule 3     sertraline (ZOLOFT) 50 MG tablet Take 1 tablet by mouth daily.  5      traZODone (DESYREL) 50 MG tablet Take 1 tablet by mouth bedtime.  1     cholecalciferol, vitamin D3, (VITAMIN D3) 5,000 unit Tab Take 1 tablet (5,000 Units total) by mouth daily. VITAMIN D3 5000 UNIT ORAL CAPSULE 100 tablet 3     diclofenac sodium 3 % Gel Apply to right shoulder twice daily for pain 100 g 6     meloxicam (MOBIC) 7.5 MG tablet One tablet twice daily 60 tablet 2     No current facility-administered medications for this visit.        Total data points: 4

## 2021-06-08 NOTE — PROGRESS NOTES
"[facility] Daily Progress     Patient Name: Betsy Negron  Date: 2017  Visit #: 3  PTA visit #:  -  Referral Diagnosis:   Referring provider: Galina Reynolds MD  Visit Diagnosis:     ICD-10-CM    1. Right shoulder injury, subsequent encounter S49.91XD    2. Rotator cuff injury, right, subsequent encounter S46.001D          Assessment:     Pt had difficulty with any resistance exercise.  She had some pain with AAROM but was able to tolerate light activity.  Continued to progress AAROM.  Pt tolerated Manual therapy today better and posterior capsule stretching.    Goal Status:  Pt. will demonstrate/verbalize independence in self-management of condition in : 4 weeks  Pt. will be independent with home exercise program in : 4 weeks  Pt will: reach back ROM R=L in 6-8 weeks to be able to scratch her back or tie her hair  Pt will: tolerate carrying small objects like groceries or laundry with minimal sx in 6-8 weeks    Plan / Patient Education:     Continue with initial plan of care.  Progress with home program as tolerated.   Next visit: Continue KT, exercise progression as tolerated.    Subjective:     Pain Ratin  KT seemed to help.  Her shoulder felt like \"it wasn't moving all over the place\" per .      Objective:     Exercises:  Exercise #1: AAROM wand flexion supine  Comment #1: 10x  Exercise #2: No weight punch with L arm assist  Comment #2: 10x  Exercise #3: Table slides forward/side  Comment #3: 10x each      Treatment Today     TREATMENT MINUTES COMMENTS   Evaluation     Self-care/ Home management     Manual therapy 10 Right shoulder posterior glides supine Gr 3   Neuromuscular Re-education 4 Tape R shoulder \"I\" strips inferior/superior GH joint and anterior/posterior along the spine of scapula   Therapeutic Activity     Therapeutic Exercises 16 See above   Gait training     Modality__________________                Total 26    Blank areas are intentional and mean the treatment did " not include these items.       Tonya Alvarenga  2/9/2017

## 2021-06-09 NOTE — PROGRESS NOTES
[facility] Daily Progress     Patient Name: Betsy Negron  Date: 3/14/2017  Visit #: 8  PTA visit #:  -  Referral Diagnosis:   Referring provider: Galina Reynolds MD  Visit Diagnosis:     ICD-10-CM    1. Right shoulder injury, subsequent encounter S49.91XD    2. Rotator cuff injury, right, subsequent encounter S46.001D          Assessment:     Pt reports about 50% improvement to date.  Ongoing anterior right shoulder point tenderness, improves with KT and has been improving with manual therapy and AROM activities.  Significant weakness of RUE and pt reporting some new tenderness in the common extensor bundle of her right forearm.    Goal Status:  Pt. will demonstrate/verbalize independence in self-management of condition in : 4 weeks: ONgoing  Pt. will be independent with home exercise program in : 4 weeks: Ongoing  Pt will: reach back ROM R=L in 6-8 weeks to be able to scratch her back or tie her hair: Improved ROM  Pt will: tolerate carrying small objects like groceries or laundry with minimal sx in 6-8 weeks: Improved    Plan / Patient Education:     Continue with initial plan of care.  Progress with home program as tolerated.   Next visit: Trial UBE, pulleys, increase band exercises.    Subjective:     Pain Ratin% improved, her anterior shoulder and right elbow are still sore.  Elbow is sore medial and lateral now.  The back of her shoulder is much improved.    Objective:     Exercises:  Exercise #1: AAROM wand flexion supine  Comment #1: 10x  Exercise #2: R shoulder supine chest press, water bottle  Comment #2: 10x  Exercise #3: Table slides forward/side  Comment #3: -  Exercise #4: Theraband rows L2  Comment #4: 15x    R shoulder AROM:  Flexion 140 (Previous 115)  Abduction 110 (Previous 80)  Extension 62 pain anterior (Previous 60)  IR L4 (same)      Treatment Today     TREATMENT MINUTES COMMENTS   Evaluation     Self-care/ Home management     Manual therapy 26 Prone:  - Thoracic PAs  -  "Scapular mobilizations - demonstrates good scapular mobility  - Trigger point/MFR right sided paraspinals and rhomboid  Supine:  - Right posterior GH mobilizations and PROM all POM  - Rhythmic stabilization exercises right shoulder 3 minutes  - Right arm common extensor bundle STM   Neuromuscular Re-education     Therapeutic Activity     Therapeutic Exercises  Prone scap retraction \"T\" exercise.   Gait training     Modality__________________                Total 26    Blank areas are intentional and mean the treatment did not include these items.       Tonya Alvarenga  3/14/2017  "

## 2021-06-09 NOTE — PROGRESS NOTES
[facility] Daily Progress     Patient Name: Betsy Negron  Date: 3/10/2017  Visit #: 8  PTA visit #:  -  Referral Diagnosis:   Referring provider: Galina Reynolds MD  Visit Diagnosis:     ICD-10-CM    1. Right shoulder injury, subsequent encounter S49.91XD    2. Rotator cuff injury, right, subsequent encounter S46.001D          Assessment:     Pt reports about 50% improvement to date.  Ongoing anterior right shoulder point tenderness, improves with KT and has been improving with manual therapy and AROM activities.  Significant weakness of RUE and pt reporting some new tenderness in the common extensor bundle of her right forearm.    Goal Status:  Pt. will demonstrate/verbalize independence in self-management of condition in : 4 weeks: ONgoing  Pt. will be independent with home exercise program in : 4 weeks: Ongoing  Pt will: reach back ROM R=L in 6-8 weeks to be able to scratch her back or tie her hair: Improved ROM  Pt will: tolerate carrying small objects like groceries or laundry with minimal sx in 6-8 weeks: Improved    Plan / Patient Education:     Continue with initial plan of care.  Progress with home program as tolerated.   Next visit: Trial thoracic STM and PAs, right shoulder mobilizations    Subjective:     Pain Rating:   Feeling a little bit better since PT started.  Reports she feels 50% better and the manual therapy lasts for 1-2 days.  Her HEP is getting easier.    Objective:     Exercises:  Exercise #1: AAROM wand flexion supine  Comment #1: 10x  Exercise #2: R shoulder supine chest press, water bottle  Comment #2: 10x  Exercise #3: Table slides forward/side  Comment #3: -  Exercise #4: Theraband rows L2  Comment #4: 15x    R shoulder AROM:  Flexion 140 (Previous 115)  Abduction 110 (Previous 80)  Extension 62 pain anterior (Previous 60)  IR L4 (same)      Treatment Today     TREATMENT MINUTES COMMENTS   Evaluation     Self-care/ Home management 2 Discussed icing her right forearm at  "home   Manual therapy 27 Prone:  - Thoracic PAs  - Scapular mobilizations - demonstrates good scapular mobility  - Trigger point/MFR right sided paraspinals and rhomboid  Supine:  - Right posterior GH mobilizations and PROM all POM  - Rhythmic stabilization exercises right shoulder 3 minutes  - Right arm common extensor bundle STM   Neuromuscular Re-education  Tape R shoulder \"X\" over trigger point in right rhomboid.   Therapeutic Activity     Therapeutic Exercises  Prone scap retraction \"T\" exercise.   Gait training     Modality__________________                Total 29    Blank areas are intentional and mean the treatment did not include these items.       Tonya Alvarenga  3/10/2017  "

## 2021-06-09 NOTE — PROGRESS NOTES
[facility] Daily Progress     Patient Name: Betsy Negron  Date: 3/16/2017  Visit #: 10  PTA visit #:  1  Referral Diagnosis:   Referring provider: Galina Reynolds MD  Visit Diagnosis:     ICD-10-CM    1. Right shoulder injury, subsequent encounter S49.91XD    2. Rotator cuff injury, right, subsequent encounter S46.001D          Assessment:   R elbow pain was most significant today, aggravated slightly with UBE, but better when  neutral wrist position  Ongoing anterior right shoulder point tenderness , slight decrease in shoulder AROM from previous visit    Goal Status:  Pt. will demonstrate/verbalize independence in self-management of condition in : 4 weeks: ONgoing  Pt. will be independent with home exercise program in : 4 weeks: Ongoing  Pt will: reach back ROM R=L in 6-8 weeks to be able to scratch her back or tie her hair: Improved ROM  Pt will: tolerate carrying small objects like groceries or laundry with minimal sx in 6-8 weeks: Improved    Plan / Patient Education:     Continue with initial plan of care.  Progress with home program as tolerated.   Next visit: Continue  UBE, pulleys, MT as indicated    Subjective:     Pain Rating: R shoulder 5. Elbow 9  Remains 50% improved, her anterior shoulder and right elbow are still sore.  Elbow is sore ,medial and lateral .  Back of R shoulder remains less painful    Objective:     Exercises:  Exercise #1: AAROM wand flexion supine  Comment #1: 10x  Exercise #2: R shoulder supine chest press, water bottle  Comment #2: 10x  Exercise #3: Table slides forward/side  Comment #3: -  Exercise #4: Theraband rows L2  Comment #4: x 30, did not try L3 band due to elbow discomfort  Exercise #5: Pulleys  Comment #5: x 3' each flexion and abduction  Exercise #6: UBE  Comment #6: L1.o 4', total forward and retro, pillow behind back for positioning, cues for shoulder alignment, some elbow discomfort    R shoulder AROM:  Flexion 137 (Previous 115)  Abduction 87 (Previous  80)  Extension 40 pain anterior (Previous 60)  IR L4 (same)      Treatment Today     TREATMENT MINUTES COMMENTS   Evaluation     Self-care/ Home management     Manual therapy Not today Prone:  - Thoracic PAs  - Scapular mobilizations - demonstrates good scapular mobility  - Trigger point/MFR right sided paraspinals and rhomboid  Supine:  - Right posterior GH mobilizations and PROM all POM  - Rhythmic stabilization exercises right shoulder 3 minutes  - Right arm common extensor bundle STM   Neuromuscular Re-education     Therapeutic Activity     Therapeutic Exercises 28 See flowsheet, Pt performed UBE, pulleys, wrist extension and shoulder extension    Gait training     Modality__________________                Total 28    Blank areas are intentional and mean the treatment did not include these items.       Rica Sanches PTA,CLT  3/16/2017

## 2021-06-09 NOTE — PROGRESS NOTES
[facility] Daily Progress     Patient Name: Betsy Negron  Date: 3/23/2017  Visit #: 11  PTA visit #:  1  Referral Diagnosis:   Referring provider: Galina Reynolds MD  Visit Diagnosis:     ICD-10-CM    1. Right shoulder injury, subsequent encounter S49.91XD    2. Rotator cuff injury, right, subsequent encounter S46.001D    3. Elbow pain, right M25.521          Assessment:     Pt has improving right shoulder ROM and sx but recently feels like her right elbow has been worsening.  She feels N/T into the fingers but is intact to light touch and cervical testing does not provoke these sx.  We will continue to emphasize shoulder strength for improved distal stability and address forearm weakness as well.  I believe she has been favoring her right arm for some time and has subsequent weakness throughout RUE.    Goal Status:  Pt. will demonstrate/verbalize independence in self-management of condition in : 4 weeks: ONgoing  Pt. will be independent with home exercise program in : 4 weeks: Ongoing  Pt will: reach back ROM R=L in 6-8 weeks to be able to scratch her back or tie her hair: Improved ROM  Pt will: tolerate carrying small objects like groceries or laundry with minimal sx in 6-8 weeks: Improved    Plan / Patient Education:     Continue with initial plan of care.  Progress with home program as tolerated.   Next visit: Progress scap stability exercises, therabar for wrist strengthening, ice massage lateral epicondyle/KT, wrist stretches    Subjective:     Pain Rating: R shoulder 5. Elbow 8  She feels she made some improvement with the shoulder but her elbow is now bothering her quite a bit.  She denies that she has been doing any heavy gripping, carrying/lifting.    Objective:     Exercises:  Exercise #1: AAROM wand flexion supine  Comment #1: 10x - requires cues  Exercise #2: R shoulder supine chest press, water bottle  Comment #2: 10x  Exercise #3: Table slides forward/side  Comment #3: -  Exercise #4:  "Theraband rows L2  Comment #4: x 30, did not try L3 band due to elbow discomfort  Exercise #5: Pulleys  Comment #5: x 3' each flexion and abduction  Exercise #6: UBE  Comment #6: L1.o 4', total forward and retro, pillow behind back for positioning, cues for shoulder alignment, some elbow discomfort  Exercise #7: Right wrist flexion stretch  Comment #7: 30\"    R shoulder AROM:  Flexion 137 (Previous 115)  Abduction 87 (Previous 80)  Extension 40 pain anterior (Previous 60)  IR L4 (same)    Cervical Testing:  ROM: WNL  Spurlings negative    Right lateral epicondyle tender  Tender wrist flexion with overpressure  No tenderness to MMTs wrist flex/ext, finger extension 2-3, pronation/supination    Treatment Today     TREATMENT MINUTES COMMENTS   Evaluation     Self-care/ Home management     Manual therapy 15 Supine:  - right elbow STM/ice massage  - right elbow KT \"I\" strip  - right shoulder MFR, no more than 3'   Neuromuscular Re-education     Therapeutic Activity     Therapeutic Exercises 20 See flowsheet, Pt performed UBE, pulleys, wrist extension and shoulder extension    Gait training     Modality__________________                Total 35    Blank areas are intentional and mean the treatment did not include these items.       Tonya Alvarenga DPT  3/23/2017  "

## 2021-06-09 NOTE — PROGRESS NOTES
[facility] Daily Progress     Patient Name: Betsy Negron  Date: 3/31/2017  Visit #: 12  PTA visit #:  -  Referral Diagnosis:   Referring provider: Galina Reynolds MD  Visit Diagnosis:     ICD-10-CM    1. Right shoulder injury, subsequent encounter S49.91XD    2. Rotator cuff injury, right, subsequent encounter S46.001D    3. Elbow pain, right M25.521          Assessment:     Pt has improving sx, reports decreased pain, and has improving ROM.  PT includes manual therapy, shoulder ROM, scapular stability exercises.  Pt will benefit from continued skilled PT as long as she continues to demonstrate progress.    Goal Status:  Pt. will demonstrate/verbalize independence in self-management of condition in : 4 weeks: ONgoing  Pt. will be independent with home exercise program in : 4 weeks: Ongoing  Pt will: reach back ROM R=L in 6-8 weeks to be able to scratch her back or tie her hair: Improved ROM  Pt will: tolerate carrying small objects like groceries or laundry with minimal sx in 6-8 weeks: Improved    Plan / Patient Education:     Continue with initial plan of care.  Progress with home program as tolerated.   Next visit: Progress scap stability exercises, therabar for wrist strengthening, ice massage lateral epicondyle/KT, wrist stretches    Subjective:     Pain Ratin/10  She feels she made some improvement with the shoulder but her elbow is now bothering her quite a bit.  She denies that she has been doing any heavy gripping, carrying/lifting.    Objective:     Exercises:  Exercise #1: AAROM wand flexion supine  Comment #1: 10x - requires cues  Exercise #2: R shoulder supine chest press, water bottle  Comment #2: 10x  Exercise #3: Table slides forward/side  Comment #3: -  Exercise #4: Theraband rows L2  Comment #4: x 30, did not try L3 band due to elbow discomfort  Exercise #5: Pulleys  Comment #5: x 3' each flexion and abduction  Exercise #6: UBE  Comment #6: L1.o 4', total forward and retro,  "pillow behind back for positioning, cues for shoulder alignment, some elbow discomfort  Exercise #7: Right wrist flexion stretch  Comment #7: 30\"    R shoulder AROM:  Flexion 137 (Previous 115)  Abduction 87 (Previous 80)  Extension 40 pain anterior (Previous 60)  IR L4 (same)    Cervical Testing:  ROM: WNL  Spurlings negative    Right lateral epicondyle tender  Tender wrist flexion with overpressure  No tenderness to MMTs wrist flex/ext, finger extension 2-3, pronation/supination    Treatment Today     TREATMENT MINUTES COMMENTS   Evaluation     Self-care/ Home management     Manual therapy 25 Supine:  - right elbow STM/ice massage  - right elbow KT \"I\" strip  - right shoulder MFR, no more than 3'   Neuromuscular Re-education     Therapeutic Activity     Therapeutic Exercises     Gait training     Modality__________________                Total 25    Blank areas are intentional and mean the treatment did not include these items.       Tonya Alvarenga DPT  3/31/2017  "

## 2021-06-09 NOTE — PROGRESS NOTES
[facility] Daily Progress     Patient Name: Betsy Negron  Date: 3/2/2017  Visit #: 6  PTA visit #:  -  Referral Diagnosis:   Referring provider: Galina Reynolds MD  Visit Diagnosis:     ICD-10-CM    1. Right shoulder injury, subsequent encounter S49.91XD    2. Rotator cuff injury, right, subsequent encounter S46.001D          Assessment:     Pt demonstrates improved right shoulder AROM.  Ongoing point tenderness near AC joint on the supraspinatus tendon and pain with external rotation, ongoing + special tests.  Pt still has not met her PT goals but is progressing towards them and is appropriate to continue PT per POC.    Goal Status:  Pt. will demonstrate/verbalize independence in self-management of condition in : 4 weeks: ONgoing  Pt. will be independent with home exercise program in : 4 weeks: Ongoing  Pt will: reach back ROM R=L in 6-8 weeks to be able to scratch her back or tie her hair: Improved ROM  Pt will: tolerate carrying small objects like groceries or laundry with minimal sx in 6-8 weeks: Improved    Plan / Patient Education:     Continue with initial plan of care.  Progress with home program as tolerated.   Next visit: Trial thoracic joint mobs and scapulothoracic PROM, progress shoulder flexion as able.    Subjective:     Pain Rating:   Feeling a little bit better since PT started.  Feels she can move her arm around a little bit better      Objective:     Exercises:  Exercise #1: AAROM wand flexion supine  Comment #1: 10x  Exercise #2: R shoulder supine chest press, water bottle  Comment #2: 10x  Exercise #3: Table slides forward/side  Comment #3: -  Exercise #4: Theraband rows L2  Comment #4: 15x    R shoulder AROM:  Flexion 140 (Previous 115)  Abduction 110 (Previous 80)  Extension 62 pain anterior (Previous 60)  IR L4 (same)      Treatment Today     TREATMENT MINUTES COMMENTS   Evaluation     Self-care/ Home management     Manual therapy 15 Prone:  - Thoracic PAs  - Scapular  "mobilizations - demonstrates good scapular mobility  - Trigger point/MFR right sided paraspinals and rhomboid  Supine:  - Right posterior GH mobilizations and PROM all POM   Neuromuscular Re-education 3 Tape R shoulder \"X\" over trigger point in right rhomboid.   Therapeutic Activity     Therapeutic Exercises 12 Prone scap retraction \"T\" exercise.   Gait training     Modality__________________                Total 30    Blank areas are intentional and mean the treatment did not include these items.       Tonya Alvarenga  3/2/2017  "

## 2021-06-09 NOTE — PROGRESS NOTES
Clinic Progress Note              PRIMARY ISSUES :  diarrhea      SUBJECTIVE :       She has been having diarrhea for last 4 days.  Its about 4 episodes per day.  According to the patient, what ever she eats comes out right away with diarrhea.    She also has mild  Headaches.    She denies any change in urine output or generalized weakness.      No fever or chills.  No abdominal pain, no nausea or vomiting.  No blood in stools.      No cardiorespiratory, abdominal or neuro symptoms.        ASSESSMENT :        1. Diarrhea : likely viral : recommend Loperamide q 6 hrly prn.    2. S/p Lap colleen, about a yr back          F/u :  in 1 week as needed        TIME SPENT : 20  minutes            Objective :            Review of Systems   A 12 point comprehensive review of systems was negative except as noted.        Physical Exam    HEENT : no distended veins, no lymphadenopathy, thyroid is normal  LUNGS : b/l air entry present, no significant crackles/wheezing.  ABDOMEN : soft, non-tender, non-distended, BS present.  HEART :  Regular rate & rhythm, S1 & S2 normal, no murmur, clicks/rubs, no ankle edema  NEURO : conscious, oriented, responds to commands, no obvious focal deficit.  MUSCULOSKELETAL / EXTREMITIES :  no calf tenderness.  SKIN : no rashes  BACK : wnl        Pertinent Labs   Lab Results: personally reviewed.   Lab Results   Component Value Date     10/10/2016    K 4.6 10/10/2016     (H) 10/10/2016    CO2 24 10/10/2016    BUN 16 10/10/2016    CREATININE 0.85 10/10/2016    CALCIUM 9.8 10/10/2016           Pertinent Radiology   Radiology Results: not done  EKG Results: not done              DR. BOB CARLIN MD

## 2021-06-09 NOTE — PROGRESS NOTES
[facility] Daily Progress     Patient Name: Betsy Negron  Date: 2017  Visit #: 13  PTA visit #:  -  Referral Diagnosis:   Referring provider: Galina Reynolds MD  Visit Diagnosis:     ICD-10-CM    1. Right shoulder injury, subsequent encounter S49.91XD    2. Rotator cuff injury, right, subsequent encounter S46.001D    3. Elbow pain, right M25.521          Assessment:     Pt has improving sx, reports decreased pain, and has improving ROM.  PT includes manual therapy, R forearm stretching, shoulder ROM and scapular stability exercises.  Pt will benefit from continued skilled PT as long as she continues to demonstrate progress.  We have been unable to progress her exercises due to poor tolerance and increasing elbow pain.  Pt may need to work on these at home and return to PT when she can tolerate her current exercises.    Goal Status:  Pt. will demonstrate/verbalize independence in self-management of condition in : 4 weeks: ONgoing  Pt. will be independent with home exercise program in : 4 weeks: Ongoing  Pt will: reach back ROM R=L in 6-8 weeks to be able to scratch her back or tie her hair: Improved ROM  Pt will: tolerate carrying small objects like groceries or laundry with minimal sx in 6-8 weeks: Improved    Plan / Patient Education:     Continue with initial plan of care.  Progress with home program as tolerated.   Next visit: Reassess ROM, special tests.  May DC if appropriate and independent with exercises    Subjective:     Pain Ratin/10  She feels she made some improvement with the shoulder but her elbow is now bothering her quite a bit.  This is the same as previous.    Objective:     Exercises:  Exercise #1: AROM shoulder flexion supine 10x tolerated well  Comment #1: Sidelying AROM shoulder abduction 10x, tolerated well to 90 deg NEW  Exercise #2: R shoulder supine chest press, water bottle  Comment #2: 10x  Exercise #3: Table slides forward/side  Comment #3: -  Exercise #4:  "Theraband rows L2  Comment #4: 20x, min cues  Exercise #5: Pulleys  Comment #5: x 3' each flexion and abduction  Exercise #6: UBE  Comment #6: L2, 4', pillow behind back, cues for wrist form  Exercise #7: Right wrist flexion stretch  Comment #7: 30\"    R shoulder AROM:  Flexion 137 (Previous 115)  Abduction 87 (Previous 80)  Extension 40 pain anterior (Previous 60)  IR L4 (same)    Right lateral epicondyle tender  Tender wrist flexion with overpressure  No tenderness to MMTs wrist flex/ext, finger extension 2-3, pronation/supination     Treatment Today     TREATMENT MINUTES COMMENTS   Evaluation     Self-care/ Home management     Manual therapy 10 Supine:  - right elbow STM/ice massage  - right elbow KT \"I\" strip along musles and \"I\" strip across common extensor muscle belly  - right shoulder posterior glide and PROM   Neuromuscular Re-education     Therapeutic Activity     Therapeutic Exercises 15 See exercise flowsheet, added sidelying shoulder abduction, reviewed icing for right elbow at home   Gait training     Modality__________________                Total 25    Blank areas are intentional and mean the treatment did not include these items.       Tonya Alvarenga DPT  4/7/2017  "

## 2021-06-09 NOTE — PROGRESS NOTES
[facility] Daily Progress     Patient Name: Betsy Negron  Date: 3/7/2017  Visit #: 7  PTA visit #:  -  Referral Diagnosis:   Referring provider: Galina Reynolds MD  Visit Diagnosis:     ICD-10-CM    1. Right shoulder injury, subsequent encounter S49.91XD    2. Rotator cuff injury, right, subsequent encounter S46.001D          Assessment:     Pt reports about 50% improvement to date.  Ongoing anterior right shoulder point tenderness, improves with KT and has been improving with manual therapy and AROM activities.  Significant weakness of RUE and pt reporting some new tenderness in the common extensor bundle of her right forearm.    Goal Status:  Pt. will demonstrate/verbalize independence in self-management of condition in : 4 weeks: ONgoing  Pt. will be independent with home exercise program in : 4 weeks: Ongoing  Pt will: reach back ROM R=L in 6-8 weeks to be able to scratch her back or tie her hair: Improved ROM  Pt will: tolerate carrying small objects like groceries or laundry with minimal sx in 6-8 weeks: Improved    Plan / Patient Education:     Continue with initial plan of care.  Progress with home program as tolerated.   Next visit: Trial thoracic joint mobs and scapulothoracic PROM, progress shoulder PNF strengthening and HEP as able.    Subjective:     Pain Rating:   Feeling a little bit better since PT started.  Reports she feels 50% better and the manual therapy lasts for 1-2 days.  Her HEP is getting easier.      Objective:     Exercises:  Exercise #1: AAROM wand flexion supine  Comment #1: 10x  Exercise #2: R shoulder supine chest press, water bottle  Comment #2: 10x  Exercise #3: Table slides forward/side  Comment #3: -  Exercise #4: Theraband rows L2  Comment #4: 15x    R shoulder AROM:  Flexion 140 (Previous 115)  Abduction 110 (Previous 80)  Extension 62 pain anterior (Previous 60)  IR L4 (same)      Treatment Today     TREATMENT MINUTES COMMENTS   Evaluation     Self-care/ Home  "management     Manual therapy 25 Prone:  - Thoracic PAs  - Scapular mobilizations - demonstrates good scapular mobility  - Trigger point/MFR right sided paraspinals and rhomboid  Supine:  - Right posterior GH mobilizations and PROM all POM  - Rhythmic stabilization exercises right shoulder 3 minutes   Neuromuscular Re-education 3 Tape R shoulder \"X\" over trigger point in right rhomboid.   Therapeutic Activity     Therapeutic Exercises  Prone scap retraction \"T\" exercise.   Gait training     Modality__________________                Total 30    Blank areas are intentional and mean the treatment did not include these items.       Tonya Alvarenga  3/7/2017  "

## 2021-06-09 NOTE — PROGRESS NOTES
[facility] Daily Progress     Patient Name: Betsy Negron  Date: 2/24/2017  Visit #: 5  PTA visit #:  -  Referral Diagnosis:   Referring provider: Galina Reynolds MD  Visit Diagnosis:     ICD-10-CM    1. Right shoulder injury, subsequent encounter S49.91XD    2. Rotator cuff injury, right, subsequent encounter S46.001D          Assessment:     Pt demonstrates improved right shoulder AROM.  Ongoing point tenderness near AC joint on the supraspinatus tendon and pain with external rotation, ongoing + special tests.  Pt still has not met her PT goals but is progressing towards them and is appropriate to continue PT per POC.    Goal Status:  Pt. will demonstrate/verbalize independence in self-management of condition in : 4 weeks: ONgoing  Pt. will be independent with home exercise program in : 4 weeks: Ongoing  Pt will: reach back ROM R=L in 6-8 weeks to be able to scratch her back or tie her hair: Improved ROM  Pt will: tolerate carrying small objects like groceries or laundry with minimal sx in 6-8 weeks: Improved    Plan / Patient Education:     Continue with initial plan of care.  Progress with home program as tolerated.   Next visit: Trial thoracic joint mobs and scapulothoracic PROM, progress shoulder flexion as able.  May trial ultrasound superior shoulder/AC joint area    Subjective:     Pain Rating:   Feeling a little bit better since PT started.  Feels she can move her arm around a little bit better      Objective:     Exercises:  Exercise #1: AAROM wand flexion supine  Comment #1: 10x  Exercise #2: R shoulder supine chest press, water bottle  Comment #2: 10x  Exercise #3: Table slides forward/side  Comment #3: -  Exercise #4: Theraband rows L2  Comment #4: 15x    R shoulder ROM:  Flexion 130  Abduction 90  IR L3      Treatment Today     TREATMENT MINUTES COMMENTS   Evaluation     Self-care/ Home management     Manual therapy 15 Prone:  - Thoracic PAs  - Scapular mobilizations - demonstrates good  "scapular mobility  - Trigger point/MFR right sided paraspinals and rhomboid  Supine:  - Right posterior GH mobilizations and PROM all POM   Neuromuscular Re-education 3 Tape R shoulder \"Y\" strip from superior AC joint down around ant/post deltoid.  Reviewed tape precautions for skin, remove after 2 days.   Therapeutic Activity     Therapeutic Exercises 11 See above   Gait training     Modality__________________                Total 29    Blank areas are intentional and mean the treatment did not include these items.       Tonya Alvarenga  2/24/2017  "

## 2021-06-10 NOTE — TELEPHONE ENCOUNTER
Pt was wondering if she can see you again soon for her feet. She said she does not want to see anyone else since you know her history. She said if ou are unable to see her, would you be able to prescribe her some patches.

## 2021-06-10 NOTE — PROGRESS NOTES
[facility] Daily Progress     Patient Name: Betsy Negron  Date: 2017  Visit #: 14  PTA visit #:  -  Referral Diagnosis:   Referring provider: Galina Reynolds MD  Visit Diagnosis:     ICD-10-CM    1. Right shoulder injury, subsequent encounter S49.91XD    2. Rotator cuff injury, right, subsequent encounter S46.001D          Assessment:     Pt has improving sx, reports decreased pain, and has improving ROM.  PT includes manual therapy, R forearm stretching, shoulder ROM and scapular stability exercises.  Pt will benefit from continued skilled PT as long as she continues to demonstrate progress.  We have been unable to progress her exercises due to poor tolerance and increasing elbow pain.  Pt may need to work on these at home and return to PT when she can tolerate her current exercises.    Goal Status:  Pt. will demonstrate/verbalize independence in self-management of condition in : 4 weeks: ONgoing  Pt. will be independent with home exercise program in : 4 weeks: Ongoing  Pt will: reach back ROM R=L in 6-8 weeks to be able to scratch her back or tie her hair: Improved ROM  Pt will: tolerate carrying small objects like groceries or laundry with minimal sx in 6-8 weeks: Improved    Plan / Patient Education:     Continue with initial plan of care.  Progress with home program as tolerated.   Next visit: May DC    Subjective:     Pain Ratin/10  She feels some improvement, is still not able to carry many groceries.    Objective:     Exercises:  Exercise #1: AROM shoulder flexion supine 10x tolerated well  Comment #1: Sidelying AROM shoulder abduction 10x, tolerated well to 90 deg NEW  Exercise #2: R shoulder supine chest press, water bottle  Comment #2: 10x  Exercise #3: Wall slides flexion  Comment #3: 10x  Exercise #4: Theraband rows L3  Comment #4: 20x, ongoing verbal cues to back up  Exercise #5: Pulleys  Comment #5: x 3' each flexion and abduction  Exercise #6: UBE  Comment #6: L2, 4', pillow  "behind back, cues for wrist form  Exercise #7: Right wrist flexion stretch  Comment #7: 30\"    R shoulder AROM:  Flexion 137 (Previous 115)  Abduction 87 (Previous 80)  Extension 40 pain anterior (Previous 60)  IR L4 (same)    Treatment Today     TREATMENT MINUTES COMMENTS   Evaluation     Self-care/ Home management     Manual therapy 15 Supine:  - Cervical manual traction and right upper trap release, scalene/SCM stretch  - Right shoulder GH mobilizations, post/inf Gr 2-3  - Passive nerve glides, median/ulnar   Neuromuscular Re-education     Therapeutic Activity     Therapeutic Exercises 15 See ex flowsheet   Gait training     Modality__________________                Total 30    Blank areas are intentional and mean the treatment did not include these items.       Tonya Alvarenga DPT  4/11/2017  "

## 2021-06-10 NOTE — PROGRESS NOTES
Assessment/Plan:        Diagnoses and all orders for this visit:    Right upper quadrant abdominal pain-we did recheck her liver tests today and I will let her know the results.  She is having stools about 2030 minutes every time after she eats for the last week.  They are not particularly runny.  I explained that this can happen after having had her gallbladder removed and that may be worse with greasy or fatty foods.  I reassured her that I do not think there is any serious problem.  -     Hepatic Profile    Lateral epicondylitis of right elbow-3 times a day for 10 minutes at a time.  She was instructed in how the weather the band and recommended he use ice-     Tennis elbow band (universal)    Major Depression, Single Episode the sertraline is helping with her depression is stable.  She does not have any thoughts of wanting to harm herself or others.  She will continue with her current dose.    Incomplete tear of right rotator cuff-this is improved with physical therapy.  I stressed to her the importance of continue to do the exercises at home in order to maintain the improvement and continue to strengthen.    Chronic bilateral low back pain without sciatica her current medications are helping and she will continue with those.-    Vitamin D deficiency-she is on daily vitamin D and will continue with that.    Chronic gastritis-she is not having any pain or symptoms.  She will continue with omeprazole once daily.      she will follow-up with me in 3 months and sooner if any problems.    Subjective:    Patient ID: Betsy Negron is a 51 y.o. female.    HPI:  For the past 3 weeks, has had to have a BM about 20 mins after every time she eats.  Some abd pain, no cramping.  No lood in stools, stools not loose.  No blood in stools, no nausea or vomiting.  Ever since she had her go bladder out a few years ago.  She feels like she has something in the right upper part of her abdomen whenever she bends over.  It feels like  there is something there that does not belong there.    She still having some pain in her right shoulder.  It has gotten much better with the physical therapy.  She has 1 physical therapy visit remaining.  She now has pain in her right elbow.  It is worse when she moves her elbow.  She wants to know if there is anything that can be done about this.    The following portions of the patient's history were reviewed and updated as appropriate: allergies, current medications, past family history, past medical history, past social history, past surgical history and problem list.    Review of Systems  12 sys rev neg other than HPI        Objective:    Physical Exam   Patient is in no apparent physical distress.  Vitals are as recorded.  Head and face are normal.  Conjunctiva are clear.  Neck is without adenopathy or masses.  Cardiovascular :  Regular rate and rhythm with no murmurs.  Lungs are clear with good air movement bilaterally.  Extremities are without edema.  Limited ROM of right shoulder due to pain.  Tender over rt lateral epicondyle .  She is concerned about her liver.  She does not have any history of liver problems.  Gait is normal.  Skin is without rashes.  Mood and affect are appropriate.

## 2021-06-10 NOTE — PROGRESS NOTES
[facility] Daily Progress / Discharge Summary    Patient Name: Betsy Negron  Date: 2017  Visit #: 15  PTA visit #:  -  Referral Diagnosis:   Referring provider: Galina Reynolds MD  Visit Diagnosis:     ICD-10-CM    1. Right shoulder injury, subsequent encounter S49.91XD    2. Rotator cuff injury, right, subsequent encounter S46.001D    3. Elbow pain, right M25.521          Assessment:     Betsy made initial gains with PT and reports decreased sx.  Her ROM has not improved and in fact seems to be worsening over the last week despite being compliant with her HEP.  It has become difficult to progress her exercises without provoking sx at her shoulder or her right elbow.  I asked her to continue on her own with her exercises and contact her physician if her sx worsen or do not improve with time and her HEP.  I will discharge her at this time with PT due to plateau of progress.    Goal Status:  Pt. will demonstrate/verbalize independence in self-management of condition in : 4 weeks: Ongoing  Pt. will be independent with home exercise program in : 4 weeks: MET  Pt will: reach back ROM R=L in 6-8 weeks to be able to scratch her back or tie her hair: Able to tie her hair, not able to scratch her back.  Pt will: tolerate carrying small objects like groceries or laundry with minimal sx in 6-8 weeks: NOT MET    Plan / Patient Education:     Discharge due to plateau of progress.    Subjective:     Pain Ratin/10  She feels some improvement, is still not able to carry many groceries.    Objective:     R shoulder AROM:  Today:  Flexion 115, abduction 87, Ext 70, IR to L4  3/23/17:  Flexion 137, Abduction 87, Extension 40 pain anterior  IR L4  Initial Eval 2/3/17:  Flexion 115, abduction 80, Ext 60, IR to L4    Previous special tests:  + painful arch, + ERLS, + drop arm sign    Exercises: UBE warmup 4', reviewed HEP:  Supine snow angels 10x,  Shoulder press supine 10x 1-2#,  Table slide (wall slide) 10x,  R  shoulder IR stretch with towel 10x,  Sidelying abduction to 90 10x,  Theraband rows L2 min cues to back up, 15x        Treatment Today     TREATMENT MINUTES COMMENTS   Evaluation     Self-care/ Home management     Manual therapy     Neuromuscular Re-education     Therapeutic Activity     Therapeutic Exercises 20 See HEP, time spent on reassessment.   Gait training     Modality__________________                Total 20 Pt 5 min late to appointment due to transportation issues   Blank areas are intentional and mean the treatment did not include these items.       Tonya Alvarenga DPT  4/13/2017

## 2021-06-10 NOTE — PROGRESS NOTES
ASSESSMENT/PLAN:    Plantar warts  Given the symmetry of the lesions and lack of black dots inside the hardened areas, I think she actually has deep calluses or corns and not truly plantar warts.  However, either should respond to topical salicylic acid and so I have prescribed that for her.  I have also shown her picture of corn cushions that she could buy OTC.  I have encouraged her to return to clinic if her symptoms are worsening or not improving.  -     salicylic acid (COMPOUND-W) 40 % PtMd topical pad; Apply to warts on feet as directed.  Dispense: 14 each; Refill: 1              SUBJECTIVE:  Betsy Negron is a 55 y.o. female here for foot pain.    Patient complains about 3 months of bilateral foot pain on the balls of her feet.  She has had this before.  Records reviewed and indicates she has had warts frozen several times for this as well.  Patient reports that her primary physician told her that if the spots are not going away then she could prescribe something that would kind of melt them away.    The areas are pretty uncomfortable to walk on, as they are hard.  She did try paring down some with a knife which helped a little.    ROS:  Remainder review of systems is negative unless previously stated    The following portions of the patient's history were reviewed and updated as appropriate: allergies, current medications and problem list  Current Outpatient Medications on File Prior to Visit   Medication Sig     cholecalciferol, vitamin D3, (VITAMIN D3) 5,000 unit Tab Take 1 tablet (5,000 Units total) by mouth daily. VITAMIN D3 5000 UNIT ORAL CAPSULE     levothyroxine (SYNTHROID, LEVOTHROID) 75 MCG tablet Take 1 tablet (75 mcg total) by mouth daily.     loratadine (CLARITIN) 10 mg tablet Take 1 tablet (10 mg total) by mouth daily.     multivitamin (ONE A DAY) per tablet Take 1 tablet by mouth daily.     omeprazole (PRILOSEC) 20 MG capsule Take 1 capsule (20 mg total) by mouth daily.     polyethylene glycol  "(GLYCOLAX) 17 gram/dose powder Take 17 g by mouth daily. In a glass of water or orange juice     sertraline (ZOLOFT) 50 MG tablet Take 1 tablet (50 mg total) by mouth daily.     traZODone (DESYREL) 50 MG tablet Take 1 tablet (50 mg total) by mouth at bedtime.     acetaminophen (TYLENOL) 500 MG tablet 2 tablets three times daily as needed for pain (Patient not taking: Reported on 8/13/2020)     gabapentin (NEURONTIN) 300 MG capsule Take 1 capsule (300 mg total) by mouth 3 (three) times a day. (Patient not taking: Reported on 8/13/2020)     menthol Gel Apply to sore muscles three times daily as needed (Patient not taking: Reported on 8/13/2020)     No current facility-administered medications on file prior to visit.         Social History     Tobacco Use   Smoking Status Never Smoker   Smokeless Tobacco Never Used       OBJECTIVE:  :  BP 90/60   Pulse 69   Temp 98.1  F (36.7  C) (Oral)   Resp 20   Ht 4' 10.5\" (1.486 m)   Wt 122 lb 12 oz (55.7 kg)   SpO2 94%   BMI 25.22 kg/m    Wt Readings from Last 3 Encounters:   08/14/20 122 lb 12 oz (55.7 kg)   01/13/20 120 lb 4 oz (54.5 kg)   10/02/19 120 lb (54.4 kg)         Gen:  A&A, NAD  Extremities: Warm and dry without edema.  Skin on the feet is dry in general.  At the base of both fourth toes there are a hard roundish hyperkeratotic areas.  There is no there are no black dots in the middle.  There is evidence that the areas have been pared down.    "

## 2021-06-11 NOTE — PROGRESS NOTES
ASSESSMENT: Betsy Negron is a 55 y.o. female presents for consultation at the request of PCP Galina Reynolds MD, with past medical history significant for hypothyroidism, chronic gastritis, major depressive disorder, anxiety, grief reaction, vitamin D deficiency, chronic LBP,, who presents today for new patient evaluation of :     -Acute left cervical radiculopathy C7 and C8 dermatomal pattern x3 weeks with paresthesias and left upper extremity weakness.  Cervical spine MRI shows C7-T1 left disc protrusion with significant left C8 nerve root compression.    No myelopathic or red flag symptoms.      NDI Score: 44    WHO 5: 8       Diagnoses and all orders for this visit:    Left cervical radiculopathy  -     OPS Interlaminar Epidural Steroid Injection Cervical; Future; Expected date: 09/23/2020    Protrusion of cervical intervertebral disc  -     OPS Interlaminar Epidural Steroid Injection Cervical; Future; Expected date: 09/23/2020    Foraminal stenosis of cervical region  -     OPS Interlaminar Epidural Steroid Injection Cervical; Future; Expected date: 09/23/2020    Chronic bilateral low back pain without sciatica    Radicular pain in left arm  -     gabapentin (NEURONTIN) 300 MG capsule; Take 3 capsules (900 mg total) by mouth 3 (three) times a day.  Dispense: 270 capsule; Refill: 3       PLAN:  Reviewed spine anatomy and disease process. Discussed diagnosis and treatment options with the patient today. A shared decision making model was used. The patient's values and choices were respected. The following represents what was discussed and decided upon by the provider and the patient.     -DIAGNOSTIC TESTS:  Images were personally reviewed and interpreted and explained to patient today using spine model.   --Cervical spine MRI 9/15/2020 with moderate left disc protrusion at C7-T1 with left C8 nerve root compression.  C6-7 disc osteophyte complex with moderate left foraminal stenosis and left C7 nerve root  compression.  --Lumbar spine x-ray 1/7/2019 with straightening to lordosis, disc height and facet joints normal.    -PHYSICAL THERAPY: Did discuss that if symptoms improve with injection we can trial physical therapy at that time.  Discussed the importance of core strengthening, ROM, stretching exercises with the patient and how each of these entities is important in decreasing pain.  Explained to the patient that the purpose of physical therapy is to teach the patient a home exercise program.  These exercises need to be performed every day in order to decrease pain and prevent future occurrences of pain.  Likened it to brushing one's teeth.      -MEDICATIONS: Increase gabapentin to 300 mg, to titrate up to 3 tablets 3 times daily as tolerated for radicular pain, dosing chart given.  -Did offer Tylenol 3 prescription however patient deferred and wants to trial increase gabapentin first.  Discussed multiple medication options today with patient. Discussed risks, side effects, and proper use of medications. Patient verbalized understanding.    -INTERVENTIONS: Ordered a C7-T1 interlaminar epidural steroid injection to see if we get further relief of her left cervical radiculopathy and weakness upper extremity.  Did discuss surgical options today given the weakness in her arm but she is really hoping to avoid surgery therefore we will trial an injection first and discussed with patient that if her weakness improves then we can trial conservative treatments however if her weakness does not improve I would recommend surgical opinion at that point sooner than later which she is understanding of.  Discussed risks and benefits of injections with patient today.    -PATIENT EDUCATION: 45 minutes of total visit time was spent face to face with the patient today, greater than 50% of total time spent with patient was spent on counseling, education, and coordinating care.   -10 minutes spent outside of visit time, non-face-to-face  time, reviewing chart.  -Today we also discussed the issues related to the current COVID-19 pandemic, the pros and cons of the current treatment plan, the CDC guidelines such as social distancing, washing the hands, and covering the cough.  -The patient was masked and proper PPE was worn by the provider for the duration of this visit including a surgical mask, gloves, and protective eyewear.    -FOLLOW-UP:   Follow-up for injection urgently with Dr. Lyles and then 2 weeks post injection to recheck strength.    Advised patient to call the Spine Center if symptoms worsen or you have problems controlling bladder and bowel function.   ______________________________________________________________________    SUBJECTIVE:   Betsy Negron  is a 55 y.o. female who presents today for new patient evaluation of neck pain left cervical spine is more tolerable however significant pain into the left arm specifically posterior triceps region forearm and into the fourth and fifth fingers with associated numbness and tingling and perceived weakness that started 3 weeks ago after she fell while she was hanging corn in the garage to dry.  Currently her pain is fairly significant and constant at a 6/10 up to a 10 at its worse with different movements of her neck and she has shooting pain down her arm.  Patient denies right arm symptoms, has no prior history of neck or arm symptoms, denies any recent trips or falls or balance changes, denies bowel or bladder loss control, denies saddle anesthesia.    Audie Boateng CMA was present during the entire visit and acted as .    -Treatment to Date: No prior spinal surgery or spinal injection.  No prior physical therapy or chiropractic treatments.    -Medications:  Tylenol 500 mg with no benefit  Gabapentin 300 mg 3 times daily with minimal benefit, no side effect    Current Outpatient Medications on File Prior to Encounter   Medication Sig Dispense Refill     acetaminophen (TYLENOL)  500 MG tablet 2 tablets three times daily as needed for pain (Patient not taking: Reported on 2020) 100 tablet 3     cholecalciferol, vitamin D3, (VITAMIN D3) 5,000 unit Tab Take 1 tablet (5,000 Units total) by mouth daily. VITAMIN D3 5000 UNIT ORAL CAPSULE 100 tablet 3     levothyroxine (SYNTHROID, LEVOTHROID) 75 MCG tablet Take 1 tablet (75 mcg total) by mouth daily. 90 tablet 2     loratadine (CLARITIN) 10 mg tablet Take 1 tablet (10 mg total) by mouth daily. 30 tablet 11     multivitamin (ONE A DAY) per tablet Take 1 tablet by mouth daily. 120 tablet 2     omeprazole (PRILOSEC) 20 MG capsule Take 1 capsule (20 mg total) by mouth daily. 90 capsule 3     polyethylene glycol (GLYCOLAX) 17 gram/dose powder Take 17 g by mouth daily. In a glass of water or orange juice 255 g 2     salicylic acid (COMPOUND-W) 40 % PtMd topical pad Apply to warts on feet as directed. 14 each 1     sertraline (ZOLOFT) 50 MG tablet Take 1 tablet (50 mg total) by mouth daily. 90 tablet 3     traZODone (DESYREL) 50 MG tablet Take 1 tablet (50 mg total) by mouth at bedtime. 90 tablet 3     [DISCONTINUED] gabapentin (NEURONTIN) 300 MG capsule Take 1 capsule (300 mg total) by mouth 3 (three) times a day. 90 capsule 3     No current facility-administered medications on file prior to encounter.        No Known Allergies    Past Medical History:   Diagnosis Date     Depression      GERD (gastroesophageal reflux disease)      Hypothyroidism         Patient Active Problem List   Diagnosis     Hypothyroidism     Major depressive disorder, single episode     Colon polyp     Chronic gastritis     Rotator cuff tear, right     Vitamin D deficiency     Chronic bilateral low back pain without sciatica     Anxiety     Constipation, unspecified constipation type     History of cholecystectomy     Grief reaction     Pain in both lower legs       Past Surgical History:   Procedure Laterality Date      SECTION         Family History   Problem  Relation Age of Onset     No Medical Problems Mother      No Medical Problems Father      Breast cancer Neg Hx        Reviewed past medical, surgical, and family history with patient found on new patient intake packet located in EMR Media tab.     SOCIAL HX: Patient is , not currently working.  Patient denies smoking/tobacco use, denies alcohol use, denies history being heavy drinker, denies recreational drug use.    ROS: Positive for joint pain, muscle pain, muscle fatigue, sciatica, anxiety/depression.  Specifically negative for bowel/bladder dysfunction, balance changes, headache, dizziness, foot drop, fevers, chills, appetite changes, nausea/vomiting, unexplained weight loss. Otherwise 13 systems reviewed are negative. Please see the patient's intake questionnaire from today for details.    OBJECTIVE:  /60 (Patient Site: Right Arm, Patient Position: Sitting)     PHYSICAL EXAMINATION:  --CONSTITUTIONAL: Vital signs as above. No acute distress. The patient is well nourished and well groomed.  --PSYCHIATRIC: The patient is awake, alert, oriented to person, place, time and answering questions appropriately with clear speech. Appropriate mood and affect   --HEENT: Sclera are non-injected. Extraocular muscles are intact. Thyroid moves easily upon swallowing.  Moist oral mucosa.  --SKIN: Skin over the face, bilateral upper extremities, and posterior torso is clean, dry, intact without rashes.  --LYMPH NODES: No palpable or tender anterior/posterior cervical, submandibular, or supraclavicular lymph nodes.    --RESPIRATORY: Normal rhythm and effort. No abnormal accessory muscle breathing patterns noted.   --GROSS MOTOR: Easily arises from a seated position. Toe walking and heel walking are normal.    --CERVICAL SPINE: Inspection reveals no evidence of deformity. Range of motion is limited in cervical flexion, extension, and left greater than right lateral rotation due to pain. No tenderness to palpation  cervical spine.  Spurling maneuver positive on the left.  --SHOULDERS: Full range of motion bilaterally: abduction, flexion, cross chest movement internal/external rotation. Negative empty can.  --UPPER EXTREMITY MOTOR TESTING:  Wrist flexion left 5/5, right 5/5  Wrist extension left 5/5, right 5/5  Pronators left 5/5, right 5/5  Biceps left 5/5, right 5/5   Triceps left 5/5, right 5/5   Shoulder abduction left 5/5, right 5/5   left 3/5, right 5/5  --NEUROLOGIC: CN III-XII are grossly intact. 2/4 symmetric biceps, brachioradialis, triceps reflexes bilaterally. Sensation to upper extremities is intact.  Negative De Los Santos's bilaterally.    --VASCULAR: 2/4 radial pulses bilaterally. Warm upper limbs bilaterally. Capillary refill in the upper extremities is less than 1 second.    RESULTS: Prior medical records from [facility] 1/13/2020 to [facility] everywhere were reviewed today.     Imaging:  Cervical spine Imaging was personally reviewed and interpreted today. The images were shown to the patient and the findings were explained using a spine model.      Mr Cervical Spine Without Contrast  Result Date: 9/15/2020  EXAM: MR CERVICAL SPINE WO CONTRAST LOCATION: [facility] DATE/TIME: 9/15/2020 7:07 AM INDICATION: Cervical radiculopathy. COMPARISON: None. TECHNIQUE: Without IV contrast. FINDINGS: Normal vertebral body heights, alignment and marrow signal. No abnormal cord signal. No extraspinal abnormality.   Craniovertebral junction and C1-C2: Normal.   C2-C3: Tiny central disc protrusion extending just to the left of midline. Normal facets. Patent central canal and foramen.   C3-C4: Disc desiccation and mild posterior annular bulge. Unremarkable facets. Patent central canal and foramen.   C4-C5: Broad-based disc osteophyte complex. Partial effacement of the ventral CSF space without significant canal stenosis. Uncinate spur with mild to moderate bilateral foraminal stenosis.   C5-C6: Shallow,  central disc bulge osteophyte complex. Uncinate spur with mild bilateral foraminal stenosis.   C6-C7: Broad-based disc bulge osteophyte complex. Left uncinate spur with moderate left foraminal stenosis. Left C7 nerve contact.   C7-T1: Moderate-sized left paracentral/foraminal disc extrusion is present. Soft disc material noted extending above and below the adjacent endplates. Protrusion measures 10 mm mediolateral by 4 mm AP by 7 mm cephalocaudad. Significant compression of the  exiting left C8 nerve within the foramen. Negative evidence for cord compression or significant canal stenosis. Right foramen minimally narrowed due to facet hypertrophic change.   1.  Moderate-sized left paracentral/foraminal C7-T1 disc extrusion. Significant left C8 nerve impingement within the narrowed ipsilateral foramen.   2.  Disc bulges and disc osteophyte complexes at C3-C4 through C6-C7.   3.  Tiny C2-C3 disc protrusion.       XR LUMBAR SPINE 2 OR 3 VWS  LOCATION: [facility]  DATE/TIME: 1/7/2019  INDICATION: Low back pain  COMPARISON: None.  FINDINGS: No fracture. Normal vertebral body heights. Straightened lordosis. Normal coronal alignment.  Normal disc spaces and facets for age.  Normal extraspinal structures.

## 2021-06-11 NOTE — PATIENT INSTRUCTIONS - HE
~Please call Nurse Navigation line [phone] with any questions or concerns about your treatment plan, if symptoms worsen and you would like to be seen urgently, or if you have problems controlling bladder and bowel function.      Prescribed Gabapentin today, 300 mg tablets, to be titrated up to 3 tablets 3 times a day as tolerated for your nerve pain. Please follow Gabapentin dosing chart below.    Gabapentin 300mg Dosing Chart    DATE  MORNING AFTERNOON BEDTIME    Day 10 1 1 2    Day 11 1 1 2    Day 12 1 1 2    Day 13 2 1 2    Day 14 2 1 2    Day 15 2 1 2    Day 16 2 2 2    Day 17 2 2 2    Day 18 2 2 2    Day 19 2 2 3    Day 20 2 2 3    Day 21 2 2 3    Day 22 3 2 3    Day 23 3 2 3    Day 24 3 2 3    Day 25 3 3 3    Day 26 3 3 3    Day 27 3 3 3     Continue medication, taking 3 capsules three times daily    Please call if you have any questions regarding how to take your medication

## 2021-06-11 NOTE — PROGRESS NOTES
Patient is scheduled for next week, as she wants daughter to attend appt as well and she does not know her daughter's schedule:    Sep 23, 2020  9:00 AM CDT   Spine Care Consult with Delia Brooks, CNP, SP JAQUELINE NURSE   [facility] ([facility])  [address] [facility] [address]   [facility] [address]   [phone]

## 2021-06-11 NOTE — PROGRESS NOTES
"ASSESMENT AND PLAN:  Diagnoses and all orders for this visit:    Radicular pain in left arm  Reviewed the differential diagnosis with the patient with the help of a professional .  We talked about options for further evaluation and treatment.  -     MR Cervical Spine Without Contrast; Future; Expected date: 09/21/2020  -     gabapentin (NEURONTIN) 300 MG capsule; Take 1 capsule (300 mg total) by mouth 3 (three) times a day.  Dispense: 90 capsule; Refill: 3        Addendum-MRI results available and does show a significant impingement as detailed below.  Urgent referral to spine clinic.    IMPRESSION:   1.  Moderate-sized left paracentral/foraminal C7-T1 disc extrusion. Significant left C8 nerve impingement within the narrowed ipsilateral foramen.     2.  Disc bulges and disc osteophyte complexes at C3-C4 through C6-C7.     3.  Tiny C2-C3 disc protrusion.       Immunization deficiency  -     Influenza, Seasonal Quad, PF =/> 6months        Reviewed the risks and benefits of the treatment plan with the patient and/or caregiver and we discussed indications for routine and emergent follow-up.        SUBJECTIVE: 55-year-old female with a one-week history of moderate to severe pain starting in her left upper back/shoulder area and radiating all the way down to her hand.  It is very positional.  Patient says it feels like she has a \"pinched nerve\" and the pain has been disruptive to her sleep.  It has been associated with some feelings of weakness and tingling in the left hand.  It has not been associated with any chest pain or shortness of breath.  She does not have any exertional trigger, the pain is been there steadily and consistently ever since its onset.  Around the time of onset she does not recall any injury.  Patient is not currently taking any medication except acetaminophen for it.  Acetaminophen offered no relief.  In the past the patient had been on gabapentin, she does not recall any side effects or " problems from that medication in the past.  She is not currently taking it.    Past Medical History:   Diagnosis Date     Depression      GERD (gastroesophageal reflux disease)      Hypothyroidism      Patient Active Problem List   Diagnosis     Hypothyroidism     Major depressive disorder, single episode     Colon polyp     Chronic gastritis     Rotator cuff tear, right     Vitamin D deficiency     Chronic bilateral low back pain without sciatica     Anxiety     Constipation, unspecified constipation type     History of cholecystectomy     Grief reaction     Pain in both lower legs     Current Outpatient Medications   Medication Sig Dispense Refill     levothyroxine (SYNTHROID, LEVOTHROID) 75 MCG tablet Take 1 tablet (75 mcg total) by mouth daily. 90 tablet 2     sertraline (ZOLOFT) 50 MG tablet Take 1 tablet (50 mg total) by mouth daily. 90 tablet 3     traZODone (DESYREL) 50 MG tablet Take 1 tablet (50 mg total) by mouth at bedtime. 90 tablet 3     acetaminophen (TYLENOL) 500 MG tablet 2 tablets three times daily as needed for pain (Patient not taking: Reported on 8/13/2020) 100 tablet 3     cholecalciferol, vitamin D3, (VITAMIN D3) 5,000 unit Tab Take 1 tablet (5,000 Units total) by mouth daily. VITAMIN D3 5000 UNIT ORAL CAPSULE 100 tablet 3     gabapentin (NEURONTIN) 300 MG capsule Take 1 capsule (300 mg total) by mouth 3 (three) times a day. 90 capsule 3     loratadine (CLARITIN) 10 mg tablet Take 1 tablet (10 mg total) by mouth daily. 30 tablet 11     multivitamin (ONE A DAY) per tablet Take 1 tablet by mouth daily. 120 tablet 2     omeprazole (PRILOSEC) 20 MG capsule Take 1 capsule (20 mg total) by mouth daily. 90 capsule 3     polyethylene glycol (GLYCOLAX) 17 gram/dose powder Take 17 g by mouth daily. In a glass of water or orange juice 255 g 2     salicylic acid (COMPOUND-W) 40 % PtMd topical pad Apply to warts on feet as directed. 14 each 1     No current facility-administered medications for this visit.  "     Social History     Tobacco Use   Smoking Status Never Smoker   Smokeless Tobacco Never Used       OBJECTICE: BP 92/60 (Patient Site: Right Arm, Patient Position: Sitting)   Pulse (!) 55   Temp 98.5  F (36.9  C) (Oral)   Resp 20   Ht 4' 10.5\" (1.486 m)   Wt 123 lb (55.8 kg)   SpO2 97%   BMI 25.27 kg/m       No results found for this or any previous visit (from the past 24 hour(s)).     GEN-alert, appropriate, in no apparent distress   Musculoskeletal- no point tenderness to palpation of the shoulder or neck or back.  Good range of motion of the shoulder.   CV-regular rate and rhythm with no murmur   RESP-lungs clear to auscultation   Neurologic- she does have a slightly diminished  strength on the left hand compared to the right.  Sensation to light touch is intact and symmetric.   EXTREM-warm with no edema   SKIN-no ulcers or vesicles      Kranthi Smith"

## 2021-06-11 NOTE — PATIENT INSTRUCTIONS - HE
Please follow up two weeks post procedure with Delia to evaluate your plan of care.       DISCHARGE INSTRUCTIONS    During office hours (8:00 a.m.- 4:00 p.m.) questions or concerns may be answered  by calling [facility]ine Center Navigation Nurses at  [phone].  Messages received after hours will be returned the following business day.      In the case of an emergency, please dial 911 or seek assistance at the nearest Emergency Room/Urgent Care facility.     All Patients:    ? You may experience an increase in your symptoms for the first 2 days (It may take anywhere between 2 days- 2 weeks for the steroid to have maximum effect).    ? You may use ice on the injection site, as frequently as 20 minutes each hour if needed.    ? You may take your pain medicine.    ? You may continue taking your regular medication after your injection. If you have had a Medial Branch Block you may resume pain medication once your pain diary is completed.    ? You may shower. No swimming, tub bath or hot tub for 48 hours.  You may remove your bandaid/bandage as soon as you are home.    ? You may resume light activities, as tolerated.    ? Resume your usual diet as tolerated.    ? It is strongly advised that you do not drive for 1-3 hours post injection.    ? If you have had oral sedation:  Do not drive for 8 hours post injection.      ? If you have had IV sedation:  Do not drive for 24 hours post injection.  Do not operate hazardous machinery or make important personal/business decisions for 24 hours.      POSSIBLE STEROID SIDE EFFECTS (If steroid/cortisone was used for your procedure)    -If you experience these symptoms, it should only last for a short period      Swelling of the legs                Skin redness (flushing)       Mouth (oral) irritation     Blood sugar (glucose) levels              Sweats                      Mood changes    Headache    Sleeplessness         POSSIBLE PROCEDURE SIDE EFFECTS  -Call the Spine Center if you  are concerned    Increased Pain             Increased numbness/tingling        Nausea/Vomiting            Bruising/bleeding at site        Redness or swelling                                                Difficulty walking        Weakness             Fever greater than 100.5    *In the event of a severe headache after an epidural steroid injection that is relieved by lying down, please call the [facility] to speak with a clinical staff member*

## 2021-06-12 NOTE — PATIENT INSTRUCTIONS - HE

## 2021-06-12 NOTE — PROGRESS NOTES
Assessment:     Diagnoses and all orders for this visit:    Left cervical radiculopathy  -     Ambulatory referral to PT/OT    Radicular pain in left arm  -     Ambulatory referral to PT/OT    Protrusion of cervical intervertebral disc  -     Ambulatory referral to PT/OT    Foraminal stenosis of cervical region  -     Ambulatory referral to PT/OT    Chronic bilateral low back pain without sciatica    Myofascial pain  -     Ambulatory referral to PT/OT       Betsy Negron is a 55 y.o. y.o. female with past medical history significant for hypothyroidism, chronic gastritis, major depressive disorder, anxiety, grief reaction, vitamin D deficiency, chronic LBP who presents today for follow-up regarding:     -Acute left cervical radiculopathy C7 and C8 dermatomal pattern with significant relief with C7-T1 interlaminar OLIVIA, some continuing paresthesias into the fifth finger.    Patient is neurologically intact and denies perceived weakness at this time.    Plan:     A shared decision making plan was used.  The patient's values and choices were respected. Prior medical records from 9/23/2020 to current were reviewed today. The following represents what was discussed and decided upon by the provider and the patient.     -DIAGNOSTIC TESTS: Images were personally reviewed and interpreted.    --Cervical spine MRI 9/15/2020 with moderate left disc protrusion at C7-T1 with left C8 nerve root compression.  C6-7 disc osteophyte complex with moderate left foraminal stenosis and left C7 nerve root compression.  --Lumbar spine x-ray 1/7/2019 with straightening to lordosis, disc height and facet joints normal.    -INTERVENTIONS: Discussed that we can repeat the injection in 3 months if needed only.    -MEDICATIONS: Advised patient that she can continue gabapentin 300 mg, 2 tablets 3 times daily which she is tolerating, higher dose did make her drowsy.  Did discuss that if her pain is better still she can wean down to 1 tablet 3 times a  day at this point.  If she is doing well in 6 weeks we can have her discontinue.  Discussed side effects of medications and proper use. Patient verbalized understanding.    -PHYSICAL THERAPY: Referral to physical therapy placed to optimum to establish cervical strengthening exercises and nerve glides.  Discussed the importance of core strengthening, ROM, stretching exercises with the patient and how each of these entities is important in decreasing pain.  Explained to the patient that the purpose of physical therapy is to teach the patient a home exercise program.  These exercises need to be performed every day in order to decrease pain and prevent future occurrences of pain.        -PATIENT EDUCATION: 20 minutes of total visit time was spent face to face with the patient today, 60 % of the visit was spent on counseling, education, and coordinating care.   -5 minutes spent outside of visit time, non-face-to-face time, reviewing chart.  -Today we also discussed the issues related to the current COVID-19 pandemic, the pros and cons of the current treatment plan, the CDC guidelines such as social distancing, washing the hands, and covering the cough.    -FOLLOW UP: 6 weeks as needed, sooner if symptoms worsen or return  Advised to contact clinic if symptoms worsen or change.    Subjective:     Betsy Negron is a 55 y.o. female who presents today for follow-up regarding neck pain left cervical spine and left arm which she did receive significant relief with recent C7-T1 IL OLIVIA, she does report resolution of her arm symptoms and her strength appears equal bilaterally at this time.  She does have still some lingering numbness and tingling into the fourth and fifth fingers on the left however denies any pain again in her arm.  Her current pain in her neck and post scapular region is a 4/10, a 0 at its best.  Previously prior to the injection her pain was a 10/10 at its worst.    No myelopathic symptoms.     -Treatment to  Date: No prior spinal surgery or spinal injection.  No prior physical therapy or chiropractic treatments.    C7-T1 IL OLIVIA 9/25/2020 with significant relief and resolution of left arm pain.  Preprocedure pain 8/10, post 5/10.     -Medications:  Tylenol 500 mg with no benefit  Gabapentin 300 mg 3 times daily with minimal benefit, no side effect    Patient Active Problem List   Diagnosis     Hypothyroidism     Major depressive disorder, single episode     Colon polyp     Chronic gastritis     Rotator cuff tear, right     Vitamin D deficiency     Chronic bilateral low back pain without sciatica     Anxiety     Constipation, unspecified constipation type     History of cholecystectomy     Grief reaction     Pain in both lower legs       Current Outpatient Medications on File Prior to Encounter   Medication Sig Dispense Refill     acetaminophen (TYLENOL) 500 MG tablet 2 tablets three times daily as needed for pain 100 tablet 3     cholecalciferol, vitamin D3, (VITAMIN D3) 5,000 unit Tab Take 1 tablet (5,000 Units total) by mouth daily. VITAMIN D3 5000 UNIT ORAL CAPSULE 100 tablet 3     gabapentin (NEURONTIN) 300 MG capsule Take 3 capsules (900 mg total) by mouth 3 (three) times a day. 270 capsule 3     levothyroxine (SYNTHROID, LEVOTHROID) 75 MCG tablet Take 1 tablet (75 mcg total) by mouth daily. 90 tablet 2     loratadine (CLARITIN) 10 mg tablet Take 1 tablet (10 mg total) by mouth daily. 30 tablet 11     multivitamin (ONE A DAY) per tablet Take 1 tablet by mouth daily. 120 tablet 2     omeprazole (PRILOSEC) 20 MG capsule Take 1 capsule (20 mg total) by mouth daily. 90 capsule 3     polyethylene glycol (GLYCOLAX) 17 gram/dose powder Take 17 g by mouth daily. In a glass of water or orange juice 255 g 2     salicylic acid (COMPOUND-W) 40 % PtMd topical pad Apply to warts on feet as directed. 14 each 1     sertraline (ZOLOFT) 50 MG tablet Take 1 tablet (50 mg total) by mouth daily. 90 tablet 3     traZODone (DESYREL) 50 MG  tablet Take 1 tablet (50 mg total) by mouth at bedtime. 90 tablet 3     No current facility-administered medications on file prior to encounter.        No Known Allergies    Past Medical History:   Diagnosis Date     Depression      GERD (gastroesophageal reflux disease)      Hypothyroidism         Review of Systems  ROS: Specifically negative for bowel/bladder dysfunction, balance changes, headache, dizziness, foot drop, fevers, chills, appetite changes, nausea/vomiting, unexplained weight loss. Otherwise 13 systems reviewed are negative. Please see the patient's intake questionnaire from today for details.    Reviewed Social, Family, Past Medical and Past Surgical history with patient, no significant changes noted since prior visit.     Objective:     There were no vitals taken for this visit.    PHYSICAL EXAMINATION:   --CONSTITUTIONAL: Vital signs as above. No acute distress. The patient is well nourished and well groomed.  --PSYCHIATRIC:  The patient is awake, alert, oriented to person, place, time and answering questions appropriately with clear speech. Appropriate mood and affect   --HEENT: Sclera are non-injected. Extraocular muscles are intact.   --SKIN: Skin over the face, bilateral upper extremities, and posterior torso is clean, dry, intact without rashes.  --RESPIRATORY: Normal rhythm and effort. No abnormal accessory muscle breathing patterns noted.   --GROSS MOTOR: Easily arises from a seated position.   --CERVICAL SPINE: Inspection reveals no evidence of deformity.   --UPPER EXTREMITY MOTOR TESTING:  Wrist flexion left 5/5, right 5/5  Wrist extension left 5/5, right 5/5  Biceps left 5/5, right 5/5   Triceps left 5/5, right 5/5   Shoulder abduction left 5/5, right 5/5   left 5/5, right 5/5  --NEUROLOGIC: CN III-XII are grossly intact. 2/4 symmetric biceps, brachioradialis, triceps reflexes bilaterally. Sensation to upper extremities is intact. Negative De Los Santos's bilaterally.   --VASCULAR: Warm  upper limbs bilaterally.     Imaging of the cervical spine: Cervical spine imaging was reviewed today. The images were shown to the patient and the findings were explained using a spine model.      Mr Cervical Spine Without Contrast  Result Date: 9/15/2020  EXAM: MR CERVICAL SPINE WO CONTRAST LOCATION: [facility] DATE/TIME: 9/15/2020 7:07 AM INDICATION: Cervical radiculopathy. COMPARISON: None. TECHNIQUE: Without IV contrast. FINDINGS: Normal vertebral body heights, alignment and marrow signal. No abnormal cord signal. No extraspinal abnormality. Craniovertebral junction and C1-C2: Normal. C2-C3: Tiny central disc protrusion extending just to the left of midline. Normal facets. Patent central canal and foramen. C3-C4: Disc desiccation and mild posterior annular bulge. Unremarkable facets. Patent central canal and foramen. C4-C5: Broad-based disc osteophyte complex. Partial effacement of the ventral CSF space without significant canal stenosis. Uncinate spur with mild to moderate bilateral foraminal stenosis. C5-C6: Shallow, central disc bulge osteophyte complex. Uncinate spur with mild bilateral foraminal stenosis. C6-C7: Broad-based disc bulge osteophyte complex. Left uncinate spur with moderate left foraminal stenosis. Left C7 nerve contact. C7-T1: Moderate-sized left paracentral/foraminal disc extrusion is present. Soft disc material noted extending above and below the adjacent endplates. Protrusion measures 10 mm mediolateral by 4 mm AP by 7 mm cephalocaudad. Significant compression of the  exiting left C8 nerve within the foramen. Negative evidence for cord compression or significant canal stenosis. Right foramen minimally narrowed due to facet hypertrophic change.   1.  Moderate-sized left paracentral/foraminal C7-T1 disc extrusion. Significant left C8 nerve impingement within the narrowed ipsilateral foramen.   2.  Disc bulges and disc osteophyte complexes at C3-C4 through C6-C7.   3.  Tiny C2-C3 disc  protrusion.       XR LUMBAR SPINE 2 OR 3 VWS  LOCATION: [facility]  DATE/TIME: 1/7/2019  INDICATION: Low back pain  COMPARISON: None.  FINDINGS: No fracture. Normal vertebral body heights. Straightened lordosis. Normal coronal alignment.  Normal disc spaces and facets for age.  Normal extraspinal structures.

## 2021-06-12 NOTE — PROGRESS NOTES
Assessment: /    Plan:    1. Pain in both lower extremities  Antinuclear Antibody (DIO) Cascade    Sedimentation Rate    Rheumatoid Factor Quant    meloxicam (MOBIC) 7.5 MG tablet   2. Vitamin D deficiency  Vitamin D, Total (25-Hydroxy)       She has a follow-up appointment with Dr. Reynolds on 9/28/17.  Recheck sooner if any problems.  Patient was seen with [facility] ,  Sanjeev Mccoy.      Subjective:    HPI:  Betsy Negron is a 52-year-old female presenting with bilateral leg pain.  This has been occurring for 3 weeks.  Pain has involved the distal thighs when walking.  She requests lab work to assess this.       Review of Systems: No redness of joints, swelling of joints, or stiffness of joints.  No fever, cough or dysuria.      Current Outpatient Prescriptions   Medication Sig Dispense Refill     cholecalciferol, vitamin D3, (VITAMIN D3) 5,000 unit Tab Take 1 tablet (5,000 Units total) by mouth daily. VITAMIN D3 5000 UNIT ORAL CAPSULE 100 tablet 3     diclofenac sodium 3 % Gel Apply to right shoulder twice daily for pain 100 g 6     levothyroxine (SYNTHROID, LEVOTHROID) 75 MCG tablet TAKE 1 TABLET BY MOUTH DAILY 90 tablet 0     lidocaine (XYLOCAINE) 5 % ointment Apply to right shoulder twice daily as needed for pain. 120 g 3     omeprazole (PRILOSEC) 20 MG capsule TAKE 1 CAPSULE(20 MG) BY MOUTH DAILY 90 capsule 2     sertraline (ZOLOFT) 50 MG tablet Take 1 tablet by mouth daily.  5     traZODone (DESYREL) 50 MG tablet Take 1 tablet by mouth bedtime.  1     meloxicam (MOBIC) 7.5 MG tablet One tablet twice daily 60 tablet 1     No current facility-administered medications for this visit.          Objective:    Vitals:    08/15/17 0734   BP: 102/66   Pulse: (!) 57   Resp: 17   Temp: 97.9  F (36.6  C)   SpO2: 97%       Gen:  NAD, VSS  Lungs:  normal  Heart:  normal  Hips, knees and ankles are normal.  Homans sign negative bilateral.  She is able to rise from a chair without difficulty.        ADDITIONAL HISTORY  SUMMARIZED (2): None.  DECISION TO OBTAIN EXTRA INFORMATION (1): None.   RADIOLOGY TESTS (1): None.  LABS (1): Ordered.  MEDICINE TESTS (1): None.  INDEPENDENT REVIEW (2 each): None.     Total Data Points: 1

## 2021-06-13 NOTE — PROGRESS NOTES
Assessment/Plan:        Diagnoses and all orders for this visit:    Plantar wart of left foot- treated again with paring of skin and liquid nitrogen.  She will RTC in 2 weeks for further treatment if any part of the wart remains.           Subjective:    Patient ID: Betsy Negron is a 52 y.o. female.    HPI:  Here to have the wart on the bottom of her left foot treated- it didn't go away with the first treatment.     The following portions of the patient's history were reviewed and updated as appropriate: allergies, current medications, past family history, past medical history, past social history, past surgical history and problem list.    Review of Systems      10 sys rev neg other than HPI    Objective:    Physical Exam       Patient is in no apparent physical distress.  Vitals are as recorded.  Head and face are normal.  Conjunctiva are clear. Resp rate and effort normal.       Extremities are without edema.  Gait is normal.  Skin: 8 mm wart on plantar surface of left foot.  Mood and affect are appropriate.      Procedure Note.  Callous pared of wart with scalpel.  Treated with 3 freeze-thaw cycles of liquid nitrogen.  Pt tolerated procedure well, no complications.

## 2021-06-13 NOTE — PROGRESS NOTES
"ASSESSMENT:  1. Plantar fasciitis of right foot- explained the cause of this, recommend that she wear shoes with good support. Exercises demonstrated.   - Heel Cup, Plastic Pair    2. Plantar wart of left foot  Treated with liquid nitrogen.  RTC 2 weeks for re-treatment.  Explained that if even a little of the wart remains, it can grow back.     3. Major Depression, Single Episode  Stable, continue current meds.     4. Vitamin D deficiency  Continue daily supplement    5. Constipation, unspecified constipation type  Continue stool softener daily    6. Other specified hypothyroidism  TSH WNL, continue current levothyroxine dose    7. Anxiety- stable, continue current meds.       Orders Placed This Encounter   Procedures     Heel Cup, Plastic Pair       CHIEF COMPLAINT:  Chief Complaint   Patient presents with     Diarrhea     Abdominal Pain     F/U- \"Has been better\"       HISTORY OF PRESENT ILLNESS:  Betsy is a 52 y.o. female presenting to the clinic today to address her pain and diarrhea.     Leg Pain: She saw Dr. Perez for pain in both for her legs at her last visit to the [facility]. Her leg pain has improved. She has heel pain in her right foot, which is worse when she wakes and improves as the day progresses. If she does not press on it for some time, the pain will be worse than when she has been moving about and bearing weight.     Diarrhea: Her stomach pain has improved, but she has experienced diarrhea with a burning sensation for about 2 weeks now. Oily foods make her diarrhea worse. She has been cutting the fat off of her chicken, but eating chicken still exacerbates this problem. She has not had  nausea or vomiting. No blood or mucous in her stools.  She has experienced diarrhea frequently since she had her gall bladder out  and she wonders if this problem will persist throughout her life.    REVIEW OF SYSTEMS:   She would like a painful plantar wart frozen on her left foot again because it has not " "gone away. Her appetite is stable and she does not have  side-effects of medications. She feels more stressed on some days than others,  feels her mood is OK.     All other systems are negative.    PFSH:  Previous history reviewed, no changes    TOBACCO USE:  History   Smoking Status     Never Smoker   Smokeless Tobacco     Never Used       VITALS:  Vitals:    09/28/17 1110   BP: 94/62   Patient Site: Right Arm   Patient Position: Sitting   Cuff Size: Adult Regular   Pulse: 64   Resp: 16   Temp: 97.8  F (36.6  C)   TempSrc: Oral   Weight: 124 lb (56.2 kg)   Height: 4' 10.5\" (1.486 m)     Wt Readings from Last 3 Encounters:   09/28/17 124 lb (56.2 kg)   08/15/17 123 lb (55.8 kg)   04/12/17 129 lb (58.5 kg)     Body mass index is 25.47 kg/(m^2).    PHYSICAL EXAM:  Constitutional: Alert, cooperative, no distress, appears stated age.  Head: Normocephalic, without obvious abnormality, atraumatic  Neck: Supple, symmetrical, trachea midline, no adenopathy;  thyroid: not enlarged, symmetric, no tenderness/mass/nodules  Back: Symmetric, normal curvature, ROM normal, no CVA tenderness  Lungs: Clear to auscultation bilaterally, respirations unlabored  Heart: Regular rate and rhythm, S1 and S2 normal, no murmur, rub, or gallop  Abdomen: Soft, non-tender, bowel sounds normal,  no masses, no organomegaly  Extremities: Extremities normal, atraumatic, no cyanosis or edema  Skin: Skin color, texture, turgor normal, no rashes or lesions.  1 cm wart on ball of left foot.    MS:  Tenderness over posterior plantar aspect of right foot.   Neurologic: gait nl  Psych: appears anxious    Procedure Note:  Liquid nitrogen used for 3 freeze-thaw cycles on plantar surface of left foot.  No complications.     QUALITY MEASURES:    ADDITIONAL HISTORY SUMMARIZED (2): None.  DECISION TO OBTAIN EXTRA INFORMATION (1): None.   RADIOLOGY TESTS (1): None.  LABS (1): None.  MEDICINE TESTS (1): None.  INDEPENDENT REVIEW (2 each): None.     The visit lasted " a total of 26 minutes face to face with the patient. Over 50% of the time was spent counseling and educating the patient about diarrhea post-cholecystectomy.    I, Albertina Gomez, am scribing for and in the presence of, Dr. Reynolds.    I, Galina Reynolds, personally performed the services described in this documentation, as scribed by Albertina Gomez in my presence, and it is both accurate and complete.    MEDICATIONS:  Current Outpatient Prescriptions   Medication Sig Dispense Refill     cholecalciferol, vitamin D3, (VITAMIN D3) 5,000 unit Tab Take 1 tablet (5,000 Units total) by mouth daily. VITAMIN D3 5000 UNIT ORAL CAPSULE 100 tablet 3     levothyroxine (SYNTHROID, LEVOTHROID) 75 MCG tablet TAKE 1 TABLET BY MOUTH DAILY 90 tablet 0     omeprazole (PRILOSEC) 20 MG capsule TAKE 1 CAPSULE(20 MG) BY MOUTH DAILY 90 capsule 2     sertraline (ZOLOFT) 50 MG tablet Take 1 tablet by mouth daily.  5     traZODone (DESYREL) 50 MG tablet Take 1 tablet by mouth bedtime.  1     diclofenac sodium 3 % Gel Apply to right shoulder twice daily for pain 100 g 6     lidocaine (XYLOCAINE) 5 % ointment Apply to right shoulder twice daily as needed for pain. 120 g 3     meloxicam (MOBIC) 7.5 MG tablet One tablet twice daily 60 tablet 1     No current facility-administered medications for this visit.        Total data points: None.

## 2021-06-13 NOTE — PROGRESS NOTES
Betsy was seen today for gastroesophageal reflux.    Diagnoses and all orders for this visit:    Anxiety- stable, continue sertraline.  She is able to stay in contact with her family during the pandemic, which is helpful.     Moderate major depression (H)- improved with sertraline, continue.   Follow up in 6 months    History of cholecystectomy    Hypothyroidism, unspecified type- will notify her if dose adjustment is needed.   -     Thyroid Stimulating Hormone (TSH)    Chronic gastritis- likely the cause of her abdominal pain.  Pain resolved when she was taking omeprazole, returned when she ran out of  Meidication. -     HM1(CBC and Differential)  -     Comprehensive Metabolic Panel  -     omeprazole (PRILOSEC) 20 MG capsule; Take one every morning 30 mins before eating    Psychophysiological insomnia- takes trazodone when she really needs it and that helps.  Continue as needed.   -     traZODone (DESYREL) 50 MG tablet; Take one at bedtime as needed for insomnia.    Major depressive disorder with single episode, in partial remission (H)  -     sertraline (ZOLOFT) 50 MG tablet; Take 1 tablet (50 mg total) by mouth daily.    Chronic midline low back pain with right-sided sciatica- stable, acetaminophen helps and she will continue with that  -     acetaminophen (TYLENOL) 500 MG tablet; 2 tablets three times daily as needed for pain    Screening for HIV without presence of risk factors  -     HIV Antigen/Antibody Screening Cascade    Encounter for hepatitis C screening test for low risk patient  -     Hepatitis C Antibody (Anti-HCV)    Vitamin deficiency  -     Mammo Screening Bilateral; Future  -     Vitamin D, Total (25-Hydroxy)    Encounter for screening mammogram for breast cancer    Lipid screening  -     Lipid Profile- routine     Plantar warts  -     salicylic acid (COMPOUND-W) 40 % PtMd topical pad; Apply to warts on feet as directed.    Other orders    -     Tdap vaccine,  8yo or older,  IM    RTC 6 months  for  follow up, sooner prn.     S:  Betys is here for pain in her upper right abdomen that feels like her liver hurst.  She has had her gallbladder removed and has no history of gallbladder problems.   Feels full a lot.  Use do take omeprazole, has been out of it for 2 months.  When she was talking that, this pain when  Away.  No nausea, vomiting, diarrhea. No burning in her abdomen or chest.     The medication she got for her wart took it away- it's back and she would like more of that medication.     Depression is stable.  SHe takes her sertraline daily and the trazadone for sleep only when she really needs it.  She has been able to keep in contact with her family by phone during the pandemic, not caring for her grandchildren during the day like she was before.  She still gets to see them.     O:   Patient is in no apparent physical distress.  Vitals are as recorded.  Head and face are normal.  Conjunctiva are clear.  Neck is without adenopathy or masses.  Cardiovascular :  Regular rate and rhythm with no murmurs.  Lungs are clear with good air movement bilaterally. Abd soft, NT, no organomegaly or masses.  Extremities are without edema.  Gait is normal.  Skin is without rashes.  Mood and affect are appropriate.

## 2021-06-15 PROBLEM — M54.50 CHRONIC BILATERAL LOW BACK PAIN WITHOUT SCIATICA: Status: ACTIVE | Noted: 2017-01-31

## 2021-06-15 PROBLEM — G89.29 CHRONIC BILATERAL LOW BACK PAIN WITHOUT SCIATICA: Status: ACTIVE | Noted: 2017-01-31

## 2021-06-15 NOTE — PROGRESS NOTES
ASSESSMENT AND PLAN:  52-year-old man discovered that she had hip pain for about a week after falling the snow.  She reports that she could get up independently however she had ongoing hip pain on the site.  Denies any other falls at home denies falling on the ice work.  She feels slightly better after taking Tylenol bending still a problem.  Have her continue with Naprosyn as needed side effects discussed her symptoms continue     No orders of the defined types were placed in this encounter.    There are no discontinued medications.    CHIEF COMPLAINT:  Chief Complaint   Patient presents with     Back Pain     Pt states she was taking the trash cart out and slipped on snow and fell hit on back on 01/16/2018, having R hip pain and back pain, middle of waist.     Medication Refill       HISTORY OF PRESENT ILLNESS:  Betsy is a 52 y.o. female presenting with back pain. [place] [facility] [place]2018, she was taking her trash can out to the street when she slipped and fell on her back. She is currently experiencing right hip and back pain. She describes the pain as sharp with no radiation of pain down her leg.  She has been taking Tylenol and Tiger Balm with little relief of her symptoms. She does note that her pain has improved over the past 2 days.     REVIEW OF SYSTEMS:   She denies nausea.    All other 10 point review of systems are negative.    PFSH:   Pertinent past, family, social and medical history reviewed.     TOBACCO USE:  History   Smoking Status     Never Smoker   Smokeless Tobacco     Never Used       VITALS:  Vitals:    01/23/18 1346   BP: 94/56   Patient Site: Left Arm   Patient Position: Sitting   Cuff Size: Adult Regular   Pulse: 65   Temp: 97.8  F (36.6  C)   TempSrc: Oral   SpO2: 96%   Weight: 123 lb 4 oz (55.9 kg)     Wt Readings from Last 3 Encounters:   01/23/18 123 lb 4 oz (55.9 kg)   10/12/17 124 lb (56.2 kg)   09/28/17 124 lb (56.2 kg)     Body mass index is 25.32  kg/(m^2).    PHYSICAL EXAM:  General: Alert, cooperative, no distress, appears stated age  Head: Normocephalic, without obvious abnormality, atraumatic  Musculoskeletal: Tenderness over pedicle of L4.   Neurologic: No tremor, no focal findings.   Psych: Oriented x3. Affect normal.     DATA REVIEWED:  Additional History from Old Records Summarized (2): None  Decision to Obtain Records (1): None  Radiology Tests Summarized or Ordered (1): None  Labs Reviewed or Ordered (1): None  Medicine Test Summarized or Ordered (1): None  Independent Review of EKG or X-RAY(2 each): None    The visit lasted a total of 12 minutes face to face with the patient. Over 50% of the time was spent counseling and educating the patient about back pain.     I, Reinier Arellano, am scribing for and in the presence of, Dr. Klein.    I,*jefferson klein personally performed the services described in this documentation, as scribed by Reinier Arellano in my presence, and it is both accurate and complete.      MEDICATIONS:  Current Outpatient Prescriptions   Medication Sig Dispense Refill     levothyroxine (SYNTHROID, LEVOTHROID) 75 MCG tablet TAKE 1 TABLET BY MOUTH DAILY 90 tablet 0     omeprazole (PRILOSEC) 20 MG capsule TAKE 1 CAPSULE(20 MG) BY MOUTH DAILY 90 capsule 2     sertraline (ZOLOFT) 50 MG tablet Take 1 tablet by mouth daily.  5     cholecalciferol, vitamin D3, (VITAMIN D3) 5,000 unit Tab Take 1 tablet (5,000 Units total) by mouth daily. VITAMIN D3 5000 UNIT ORAL CAPSULE 100 tablet 3     diclofenac sodium 3 % Gel Apply to right shoulder twice daily for pain 100 g 6     lidocaine (XYLOCAINE) 5 % ointment Apply to right shoulder twice daily as needed for pain. 120 g 3     meloxicam (MOBIC) 7.5 MG tablet One tablet twice daily 60 tablet 1     traZODone (DESYREL) 50 MG tablet Take 1 tablet by mouth bedtime.  1     No current facility-administered medications for this visit.        Total Data Points: 0

## 2021-06-16 PROBLEM — M79.662 PAIN IN BOTH LOWER LEGS: Status: ACTIVE | Noted: 2020-05-19

## 2021-06-16 PROBLEM — F41.9 ANXIETY: Status: ACTIVE | Noted: 2017-09-28

## 2021-06-16 PROBLEM — F43.20 GRIEF REACTION: Status: ACTIVE | Noted: 2020-04-07

## 2021-06-16 PROBLEM — K59.00 CONSTIPATION, UNSPECIFIED CONSTIPATION TYPE: Status: ACTIVE | Noted: 2017-09-28

## 2021-06-16 PROBLEM — F32.1 MODERATE MAJOR DEPRESSION (H): Status: ACTIVE | Noted: 2020-12-02

## 2021-06-16 PROBLEM — M79.661 PAIN IN BOTH LOWER LEGS: Status: ACTIVE | Noted: 2020-05-19

## 2021-06-16 PROBLEM — F43.21 GRIEF REACTION: Status: ACTIVE | Noted: 2020-04-07

## 2021-06-16 PROBLEM — Z90.49 HISTORY OF CHOLECYSTECTOMY: Status: ACTIVE | Noted: 2018-04-04

## 2021-06-16 NOTE — PROGRESS NOTES
"ASSESMENT AND PLAN:  Diagnoses and all orders for this visit:    1. Back pain  -  Fell on ice 2 months ago, no new sxs, some control with current med.   -  Add Flexeril to current med, Follow up if symptoms not better or worsens.    Refill:  -     naproxen (NAPROSYN) 375 MG tablet; TAKE 1 TABLET(375 MG) BY MOUTH TWICE DAILY WITH MEALS  Dispense: 60 tablet; Refill: 0   Ordered:  -     cyclobenzaprine (FLEXERIL) 5 MG tablet; Take 2 tablets (10 mg total) by mouth every 8 (eight) hours as needed for muscle spasms.  Dispense: 30 tablet; Refill: 1    2. Vitamin D deficiency  - Pt requesting vit D, states vit D deficiency.   Refill:  -     cholecalciferol, vitamin D3, (VITAMIN D3) 5,000 unit Tab; Take 1 tablet (5,000 Units total) by mouth daily. VITAMIN D3 5000 UNIT ORAL CAPSULE  Dispense: 100 tablet; Refill: 3   Ordered:  -     Vitamin D, Total (25-Hydroxy)    3. Fatigue  -  Unknown etiology, Tx and F/u pending results.   Ordered:  -     HM1(CBC and Differential)  -     Thyroid Cascade  -     HM1 (CBC with Diff)        -     multivitamin (ONE A DAY) per tablet; Take 1 tablet by mouth daily.  Dispense: 120 tablet; Refill: 2      Reviewed Medical/Social history and Medications.  See new changes above.   Discussed indications for emergent medical attention and routine F/u.  Patient understands and agrees with treatment plan.     SUBJECTIVE:  Betsy Negron is a 52 y.o. Female with a hx of Chronic bilateral low back pain w/o sciatica, vit D deficiency, Depression who presents with low back pain x 2 months.      Pt fell on the ice while taking out the trash.  Denies hitting head or LOC, ecchymosis, edema.  Pt did not seek medical attention, \" It hurt when I fell but I thought it would go away and not last this long.\"  Pain is 5/10, achey and radiates down to the knees.  Denies decreased strength or sensation. Pt denies any new sxs, paresthesia, fever/chills.  Pt decline imaging and would like to try new meds for her pain control. "  Pt may consider imaging or PT if still symptomatic.     Pt also complains of Fatigue and decreased appetite x several weeks   Denies heat or cold  Intolerance, DAVID, significant weight loss, fever/chills, sick exposure, cough, runny nose.     ROS:  Comprehensive Review of Systems Negative except stated in HPI.     Past Medical History:   Diagnosis Date     Depression      GERD (gastroesophageal reflux disease)      Hypothyroidism      Patient Active Problem List   Diagnosis     Hypothyroidism     Major Depression, Single Episode     Colon polyp     Chronic gastritis     Rotator cuff tear, right     Vitamin D deficiency     Chronic bilateral low back pain without sciatica     Anxiety     Constipation, unspecified constipation type     Current Outpatient Prescriptions   Medication Sig Dispense Refill     levothyroxine (SYNTHROID, LEVOTHROID) 75 MCG tablet TAKE 1 TABLET BY MOUTH DAILY 90 tablet 0     cholecalciferol, vitamin D3, (VITAMIN D3) 5,000 unit Tab Take 1 tablet (5,000 Units total) by mouth daily. VITAMIN D3 5000 UNIT ORAL CAPSULE 100 tablet 3     cyclobenzaprine (FLEXERIL) 5 MG tablet Take 2 tablets (10 mg total) by mouth every 8 (eight) hours as needed for muscle spasms. 30 tablet 1     diclofenac sodium 3 % Gel Apply to right shoulder twice daily for pain 100 g 6     lidocaine (XYLOCAINE) 5 % ointment Apply to right shoulder twice daily as needed for pain. 120 g 3     meloxicam (MOBIC) 7.5 MG tablet One tablet twice daily 60 tablet 1     multivitamin (ONE A DAY) per tablet Take 1 tablet by mouth daily. 120 tablet 2     naproxen (NAPROSYN) 375 MG tablet TAKE 1 TABLET(375 MG) BY MOUTH TWICE DAILY WITH MEALS 60 tablet 0     omeprazole (PRILOSEC) 20 MG capsule TAKE 1 CAPSULE(20 MG) BY MOUTH DAILY 90 capsule 2     sertraline (ZOLOFT) 50 MG tablet Take 1 tablet by mouth daily.  5     traZODone (DESYREL) 50 MG tablet Take 1 tablet by mouth bedtime.  1     No current facility-administered medications for this visit.  "     History   Smoking Status     Never Smoker   Smokeless Tobacco     Never Used     OBJECTIVE: /60  Pulse (!) 57  Temp 97.5  F (36.4  C) (Oral)   Resp 16  Ht 4' 10.5\" (1.486 m)  Wt 127 lb (57.6 kg)  BMI 26.09 kg/m2   No results found for this or any previous visit (from the past 24 hour(s)).    PHYSICAL:  General Alert, awake, not in acute distress.   HEENT:             -Head Atraumatic, normocephalic.            -Eyes PERRL, no erythema, discharge, conjunctiva clear.             -Ears TMs intact, no drainage, no erythema.            -Nose    Nostrils patent,  no edema.            -Throat Oropharynx without edema, erythema.  Uvula midline, no deviation.             -Neck Neck FROM, no adenopathy.  Thyroid not visibly enlarged.   CV Normal S1 & S2. No murmurs.   RESP Non-labored, RRR, CTAB. No wheezes or crackles.    BACK Mild tenderness of Right paravertebrae, no vertebral tenderness.    EXTREMITY No edema. No deformity. FROM.        Jami Cordova PA-C           "

## 2021-06-17 NOTE — PROGRESS NOTES
"ASSESMENT AND PLAN:  Diagnoses and all orders for this visit:    1. Abdominal pain, RUQ and RLQ   -  Pt has Cholecystectomy 1-2 years ago.  -  RLQ pain with Positive Psoas sign, Negative Obturator sign  -  No other sxs. Pt would prefer US instead of CT.   -  Pt is taking Tylenol for pain control.    Ordered:  -     US Abdomen Limited  -     Hepatic Profile    Reviewed Medical/Social history and Medications.  Over 25 minutes total time spent with patient, with more than 50% in counseling and coordination of care on the above issues.   No new changes.  Discussed indications for emergent medical attention and routine F/u.  Patient understands and agrees with treatment plan.     SUBJECTIVE:  Betsy Negron is a 52 y.o. Female with hx of Hypothyroidism, Colon polyp, Gastritis, Cholecystectomy who presents for RUQ and RLQ pain x 1 week.    Pain is 5-6/10, throbbing, sharp, no radiation, and lasting 24 hours a day, \"Sometimes when I get really busy I don't notice the pain but when I just sit there, I feel it.\"  Pain is not worsening, not associated with food, position and RLQ is more painful than RUQ.  Denies fever/chills, wheezing, SOB, CP, n/v, flank pain, dysuria , diarrhea/constipation, hematochezia, hematemesis.     Discussed options and risks/benefits of different modalities of imaging, Pt would like US first instead of CT.  I feel this is appropriate given Pt is stable and no worsening of sxs.  Discussed indications for urgent f/u.    ROS:  Comprehensive Review of Systems Negative except stated in HPI.     Past Medical History:   Diagnosis Date     Depression      GERD (gastroesophageal reflux disease)      Hypothyroidism      Patient Active Problem List   Diagnosis     Hypothyroidism     Major Depression, Single Episode     Colon polyp     Chronic gastritis     Rotator cuff tear, right     Vitamin D deficiency     Chronic bilateral low back pain without sciatica     Anxiety     Constipation, unspecified constipation " "type     Current Outpatient Prescriptions   Medication Sig Dispense Refill     cholecalciferol, vitamin D3, (VITAMIN D3) 5,000 unit Tab Take 1 tablet (5,000 Units total) by mouth daily. VITAMIN D3 5000 UNIT ORAL CAPSULE 100 tablet 3     cyclobenzaprine (FLEXERIL) 5 MG tablet Take 2 tablets (10 mg total) by mouth every 8 (eight) hours as needed for muscle spasms. 30 tablet 1     diclofenac sodium 3 % Gel Apply to right shoulder twice daily for pain 100 g 6     levothyroxine (SYNTHROID, LEVOTHROID) 75 MCG tablet TAKE 1 TABLET BY MOUTH DAILY 90 tablet 0     lidocaine (XYLOCAINE) 5 % ointment Apply to right shoulder twice daily as needed for pain. 120 g 3     meloxicam (MOBIC) 7.5 MG tablet One tablet twice daily 60 tablet 1     multivitamin (ONE A DAY) per tablet Take 1 tablet by mouth daily. 120 tablet 2     naproxen (NAPROSYN) 375 MG tablet TAKE 1 TABLET(375 MG) BY MOUTH TWICE DAILY WITH MEALS 60 tablet 0     omeprazole (PRILOSEC) 20 MG capsule TAKE 1 CAPSULE(20 MG) BY MOUTH DAILY 90 capsule 2     sertraline (ZOLOFT) 50 MG tablet Take 1 tablet by mouth daily.  5     traZODone (DESYREL) 50 MG tablet Take 1 tablet by mouth bedtime.  1     No current facility-administered medications for this visit.      History   Smoking Status     Never Smoker   Smokeless Tobacco     Never Used     OBJECTIVE: /60  Pulse (!) 58  Temp 97.7  F (36.5  C) (Oral)   Resp 16  Ht 4' 10.5\" (1.486 m)  Wt 122 lb (55.3 kg)  BMI 25.06 kg/m2   Recent Results (from the past 24 hour(s))   Hepatic Profile    Collection Time: 04/04/18  9:07 AM   Result Value Ref Range    Bilirubin, Total 1.0 0.0 - 1.0 mg/dL    Bilirubin, Direct 0.3 <=0.5 mg/dL    Protein, Total 6.8 6.0 - 8.0 g/dL    Albumin 3.8 3.5 - 5.0 g/dL    Alkaline Phosphatase 122 (H) 45 - 120 U/L    AST 18 0 - 40 U/L    ALT 21 0 - 45 U/L       PHYSICAL:  General Alert, awake, not in acute distress.   CV Normal S1 & S2. No murmurs.   RESP Non-labored, RRR, CTAB. No wheezes or crackles. "    ABDOMEN No rigidity.  Normal bowel sounds. Negative Obturator sign, Positive Psoas sign.  Mild RUQ and RLQ tenderness with deep palpation.   EXTREMITY No edema. .          Jami Cordova PA-C

## 2021-06-18 NOTE — PROGRESS NOTES
"ASSESMENT AND PLAN:  Diagnoses and all orders for this visit:    1. Diarrhea in adult patient  -   Loose stools non-bloody  x 1 month.   No n/v, fever, travels.  -   Pt is stable and non-toxic appearing.  Encourage to continue hydration.   -   Will do further workup if Pt is not better after tx.   -   Discussed indications for Urgent care.   Ordered:  -     methylcellulose (CITRUCEL, SUCROSE,); Take 2 g by mouth daily.  Dispense: 454 g; Refill: 0  -     loperamide (ANTI-DIARRHEAL, LOPERAMIDE,) 2 mg tablet; Take two tablets initially by mouth, then 1 tablet po after each unformed stool.  Dispense: 16 tablet; Refill: 0    2. Chronic gastritis  -  Controlled on current meds, need refills.   Refill:  -     omeprazole (PRILOSEC) 20 MG capsule; Take 1 capsule (20 mg total) by mouth daily.  Dispense: 90 capsule; Refill: 2    3. Depression  -  PHQ9= 9  -   Well controlled, continue on current tx.     Reviewed Medical/Social history and Medications.  See new changes above.   Discussed indications for emergent medical attention and routine F/u.  Patient/Parent/Guardian engaged in decision making process and verbalized understanding of the options discussed and agreed with the final treatment plan.     SUBJECTIVE:  Betsy Negron is a 52 y.o. Female who presents for diarrhea x 1 month.    Pt has a hx of Hypothyroidism, Cholecystectomy, Chronic gastritis.  She states her diarrhea seems to be getting worse, \" Whatever I eat it comes right out.\"    Pt is able to eat and drink w/o difficulty.  Stool is loose,foamy, and has foul odor, \" My stool floats\"   Abdominal pain is not different than her usual abdominal pain and not worsening.   Denies fever/chills, wheezing, SOB, CP, n/v, hematochezia, travels, camping, new foods.  Advised Pt to come for F/u in 3 days if not better and we will do further lab workup.  Pt states she will try meds for 1 week and come in if still not better.  Discussed indications for Urgent care, Pt understands. " "    ROS:  Comprehensive Review of Systems Negative except stated in HPI.     Past Medical History:   Diagnosis Date     Depression      GERD (gastroesophageal reflux disease)      Hypothyroidism      Patient Active Problem List   Diagnosis     Hypothyroidism     Major Depression, Single Episode     Colon polyp     Chronic gastritis     Rotator cuff tear, right     Vitamin D deficiency     Chronic bilateral low back pain without sciatica     Anxiety     Constipation, unspecified constipation type     History of cholecystectomy     Current Outpatient Prescriptions   Medication Sig Dispense Refill     cholecalciferol, vitamin D3, (VITAMIN D3) 5,000 unit Tab Take 1 tablet (5,000 Units total) by mouth daily. VITAMIN D3 5000 UNIT ORAL CAPSULE 100 tablet 3     levothyroxine (SYNTHROID, LEVOTHROID) 75 MCG tablet TAKE 1 TABLET BY MOUTH DAILY 90 tablet 2     meloxicam (MOBIC) 7.5 MG tablet One tablet twice daily 60 tablet 1     multivitamin (ONE A DAY) per tablet Take 1 tablet by mouth daily. 120 tablet 2     omeprazole (PRILOSEC) 20 MG capsule Take 1 capsule (20 mg total) by mouth daily. 90 capsule 2     sertraline (ZOLOFT) 50 MG tablet Take 1 tablet by mouth daily.  5     traZODone (DESYREL) 50 MG tablet Take 1 tablet by mouth bedtime.  1     loperamide (ANTI-DIARRHEAL, LOPERAMIDE,) 2 mg tablet Take two tablets initially by mouth, then 1 tablet po after each unformed stool. 16 tablet 0     methylcellulose (CITRUCEL, SUCROSE,) Take 2 g by mouth daily. 454 g 0     No current facility-administered medications for this visit.      History   Smoking Status     Never Smoker   Smokeless Tobacco     Never Used     OBJECTIVE: BP 92/60  Pulse (!) 56  Temp 97.7  F (36.5  C) (Oral)   Resp 22  Ht 4' 10.5\" (1.486 m)  Wt 119 lb 8 oz (54.2 kg)  SpO2 97%  BMI 24.55 kg/m2   No results found for this or any previous visit (from the past 24 hour(s)).    PHYSICAL:  General Alert, awake, not in acute distress.   CV Normal S1 & S2. No " murmurs.   RESP Non-labored, regular rhythm. Bradycardia.  CTAB. No wheezes or crackles.    ABDOMEN Soft, mild epigastric pain.  No rigidity, guarding.  Normal bowel sounds.    PYSCH Normal and appropriate for age.        Jami Cordova PA-C

## 2021-06-19 NOTE — PROGRESS NOTES
ASSESMENT AND PLAN:  Diagnoses and all orders for this visit:    1. F/u, Diarrhea in adult patient  -  Loose stool 3x/day for 6 months.  Non-bloody, mild Right upper abdominal pain.  Hx of cholecystectomy. Neg workup.   -  Pt feels it is getting better with Loperamide.  Metamucil is not helping much.   -  F/u in 1 month if not better or sooner if new or worsening sxs.  Indications for emergent f/u discussed.     Refill:  -     loperamide (ANTI-DIARRHEAL, LOPERAMIDE,) 2 mg tablet; Take two tablets initially by mouth, then 1 tablet po after each unformed stool.  Dispense: 16 tablet; Refill: 0    Reviewed Medical/Social history and Medications.   Discussed indications for emergent medical attention and routine F/u.  Patient/Parent/Guardian engaged in decision making process and verbalized understanding of the options discussed and agreed with the final treatment plan.     SUBJECTIVE:  Betsy Negron is a 53 y.o. Female who presents with Chronic diarrhea.    Pt has been having over 6 months of non-bloody loose stools 3x, day. Negative stool workup for shiga toxins, Salmonella, Shigella, Yersinia, or Camphylobactor,Celiac antibody, ova and parasite, and normal hepatic profile.  Pt states it has gotten much better with loperamide and has even gained some weight.  She denies fever/chills, wheezing, SOB, CP, n/v,  constipation, hematochezia.  Pt does have mild Right upper quadrant pain; pt had cholecystectomy; normal hepatic profile.   Abdominal US on 4/5/18 is also normal. Normal TSH on 3/16/18.   We discussed indications for emergent f/u.     ROS:  Comprehensive Review of Systems Negative except stated in HPI.     Past Medical History:   Diagnosis Date     Depression      GERD (gastroesophageal reflux disease)      Hypothyroidism      Patient Active Problem List   Diagnosis     Hypothyroidism     Major Depression, Single Episode     Colon polyp     Chronic gastritis     Rotator cuff tear, right     Vitamin D deficiency      "Chronic bilateral low back pain without sciatica     Anxiety     Constipation, unspecified constipation type     History of cholecystectomy     Current Outpatient Prescriptions   Medication Sig Dispense Refill     cholecalciferol, vitamin D3, (VITAMIN D3) 5,000 unit Tab Take 1 tablet (5,000 Units total) by mouth daily. VITAMIN D3 5000 UNIT ORAL CAPSULE 100 tablet 3     levothyroxine (SYNTHROID, LEVOTHROID) 75 MCG tablet TAKE 1 TABLET BY MOUTH DAILY 90 tablet 2     loperamide (ANTI-DIARRHEAL, LOPERAMIDE,) 2 mg tablet Take two tablets initially by mouth, then 1 tablet po after each unformed stool. 16 tablet 0     multivitamin (ONE A DAY) per tablet Take 1 tablet by mouth daily. 120 tablet 2     omeprazole (PRILOSEC) 20 MG capsule Take 1 capsule (20 mg total) by mouth daily. 90 capsule 2     sertraline (ZOLOFT) 50 MG tablet Take 1 tablet by mouth daily.  5     traZODone (DESYREL) 50 MG tablet Take 1 tablet by mouth bedtime.  1     meloxicam (MOBIC) 7.5 MG tablet One tablet twice daily (Patient not taking: Reported on 7/17/2018) 60 tablet 1     No current facility-administered medications for this visit.      History   Smoking Status     Never Smoker   Smokeless Tobacco     Never Used     OBJECTIVE: BP 92/60  Pulse 60  Temp 97.7  F (36.5  C) (Oral)   Resp 18  Ht 4' 10.5\" (1.486 m)  Wt 120 lb 4 oz (54.5 kg)  SpO2 96%  BMI 24.7 kg/m2   No results found for this or any previous visit (from the past 24 hour(s)).    PHYSICAL:  General Alert, awake, not in acute distress.   CV Normal S1 & S2. No murmurs.   RESP Non-labored, RRR, CTAB. No wheezes or crackles.    ABDOMEN Mild RUQ tenderness. Soft. No rigidity, guarding.  Normal bowel sounds.        Jami Cordova PA-C           "

## 2021-06-19 NOTE — PROGRESS NOTES
"ASSESMENT AND PLAN:  Diagnoses and all orders for this visit:    1.  Diarrhea in adult patient  -  5 months of non-bloody loose stool/diarrhea.  Hx of Cholecystectomy about 2 years ago and Colon polyp on 3/11/15.   -  Can not rule/out Malabsorption, steatorrhea, IBD/IBS.    -  Loperamide has been helping with sxs but not metamucil.   D/c Metamucil.  -  Pt is concerned she might have parasite; negative hx for any parasitic concerns.   -  Tx and referral to GI pending lab results.    Refill:  -     loperamide (ANTI-DIARRHEAL, LOPERAMIDE,) 2 mg tablet; Take two tablets initially by mouth, then 1 tablet po after each unformed stool.  Dispense: 16 tablet; Refill: 0   Ordered:  -     Ova and Parasite, Stool; Future  -     Culture, Stool  -     C. Difficile, PCR  -     Celiac (Gluten) Antigen Panel  -     HM1(CBC and Differential)  -     HM1 (CBC with Diff)    2.  Vitamin D deficiency  -  Low vit D level, 29.5, on 3/16/18.     Ordered:  -     cholecalciferol, vitamin D3, (VITAMIN D3) 5,000 unit Tab; Take 1 tablet (5,000 Units total) by mouth daily. VITAMIN D3 5000 UNIT ORAL CAPSULE  Dispense: 100 tablet; Refill: 3    3.  Fatigue  -  Pt feeling a bit better after taking multivitamin.   Refill:  -     multivitamin (ONE A DAY) per tablet; Take 1 tablet by mouth daily.  Dispense: 120 tablet; Refill: 2    Reviewed Medical/Social history and Medications.  See new changes above.   Discussed indications for emergent medical attention and routine F/u.  Patient/Parent/Guardian engaged in decision making process and verbalized understanding of the options discussed and agreed with the final treatment plan.     SUBJECTIVE:  Betsy Negron is a 53 y.o. Female who presents for loose stool/diarrhea x 5 months.    Pt has hx of Chronic gastritis, Colon polyp, cholecystectomy.  Pt states she never had problems with diarrhea since her cholecystectomy 2 years ago, \"If I eat any meat or anything with oil in it, I have to use the bathroom, so all " "I can eat is bland foods.\"  Pt denies loss of appetite, fever/chills,  hematochezia, hematemesis.  Pt endorses RUQ pain.  Previous imaging Negative:  US Abdomen Limited on 4/4/18; Xray Abdomen on 9/1/16. US Abdomen Limited on 11/10/15.   Colonoscopy on 3/11/15 shows tubular adenoma with 5 yr. F/u recommendations.  Stool is malodorous, foamy, and floats.      ROS:  Comprehensive Review of Systems Negative except stated in HPI.     Past Medical History:   Diagnosis Date     Depression      GERD (gastroesophageal reflux disease)      Hypothyroidism      Patient Active Problem List   Diagnosis     Hypothyroidism     Major Depression, Single Episode     Colon polyp     Chronic gastritis     Rotator cuff tear, right     Vitamin D deficiency     Chronic bilateral low back pain without sciatica     Anxiety     Constipation, unspecified constipation type     History of cholecystectomy     Current Outpatient Prescriptions   Medication Sig Dispense Refill     cholecalciferol, vitamin D3, (VITAMIN D3) 5,000 unit Tab Take 1 tablet (5,000 Units total) by mouth daily. VITAMIN D3 5000 UNIT ORAL CAPSULE 100 tablet 3     levothyroxine (SYNTHROID, LEVOTHROID) 75 MCG tablet TAKE 1 TABLET BY MOUTH DAILY 90 tablet 2     methylcellulose (CITRUCEL, SUCROSE,) Take 2 g by mouth daily. 454 g 0     sertraline (ZOLOFT) 50 MG tablet Take 1 tablet by mouth daily.  5     traZODone (DESYREL) 50 MG tablet Take 1 tablet by mouth bedtime.  1     loperamide (ANTI-DIARRHEAL, LOPERAMIDE,) 2 mg tablet Take two tablets initially by mouth, then 1 tablet po after each unformed stool. 16 tablet 0     meloxicam (MOBIC) 7.5 MG tablet One tablet twice daily (Patient not taking: Reported on 7/17/2018) 60 tablet 1     multivitamin (ONE A DAY) per tablet Take 1 tablet by mouth daily. 120 tablet 2     omeprazole (PRILOSEC) 20 MG capsule Take 1 capsule (20 mg total) by mouth daily. 90 capsule 2     No current facility-administered medications for this visit.  " "    History   Smoking Status     Never Smoker   Smokeless Tobacco     Never Used     OBJECTIVE: BP 92/52  Pulse (!) 58  Temp 97.8  F (36.6  C) (Oral)   Resp 24  Ht 4' 10.5\" (1.486 m)  Wt 118 lb 12 oz (53.9 kg)  SpO2 97%  BMI 24.4 kg/m2   Recent Results (from the past 24 hour(s))   HM1 (CBC with Diff)    Collection Time: 07/17/18  8:34 AM   Result Value Ref Range    WBC 5.3 4.0 - 11.0 thou/uL    RBC 4.82 3.80 - 5.40 mill/uL    Hemoglobin 14.7 12.0 - 16.0 g/dL    Hematocrit 44.0 35.0 - 47.0 %    MCV 91 80 - 100 fL    MCH 30.4 27.0 - 34.0 pg    MCHC 33.3 32.0 - 36.0 g/dL    RDW 12.2 11.0 - 14.5 %    Platelets 173 140 - 440 thou/uL    MPV 7.2 7.0 - 10.0 fL    Neutrophils % 57 50 - 70 %    Lymphocytes % 33 20 - 40 %    Monocytes % 8 2 - 10 %    Eosinophils % 2 0 - 6 %    Basophils % 0 0 - 2 %    Neutrophils Absolute 3.0 2.0 - 7.7 thou/uL    Lymphocytes Absolute 1.7 0.8 - 4.4 thou/uL    Monocytes Absolute 0.4 0.0 - 0.9 thou/uL    Eosinophils Absolute 0.1 0.0 - 0.4 thou/uL    Basophils Absolute 0.0 0.0 - 0.2 thou/uL       PHYSICAL:  General Alert, awake, not in acute distress.   CV Normal S1 & S2. No murmurs.   RESP Non-labored, RRR, CTAB. No wheezes or crackles.    ABDOMEN RUQ tenderness. Soft. No rigidity, guarding, organomegaly.   Normal bowel sounds.    EXTREMITY No pedal edema. Pulses present.        Jami Cordova PA-C           "

## 2021-06-19 NOTE — LETTER
Letter by Galina Reynolds MD at      Author: Galina Reynolds MD Service: -- Author Type: --    Filed:  Encounter Date: 9/30/2019 Status: Signed        [facility] PATIENT ACCESS  [address] [place] [address] [address]  [facility] [address]  [phone]         Betsy Negron  [address]  [place] [address]        09/30/19    Dear Betsy Negron,     At [facility] we care about your health and well-being. Your primary care provider is committed to ensuring you receive high quality care and has chosen a network of specialists to assist in providing that care. Recently Dr. Reynolds referred you to Radiology for specialty care.      Please call [phone] at your earliest convenience for assistance in scheduling an appointment.  If you have already scheduled this appointment, please disregard this notice.  Thank you for choosing [facility] for your healthcare needs.       Sincerely,       [facility] Specialty Scheduling

## 2021-06-20 NOTE — PROGRESS NOTES
Assessment/Plan:        Diagnoses and all orders for this visit:    Major depressive disorder with single episode, in partial remission (H)- improved with medication, continue current med    Anxiety- improved with medication , continue.      Chronic bilateral low back pain without sciatica- acetaminophen as needed for pain.  NSAID's contraindicated because of gastritis    Diarrhea in adult patient- I explained to her that the reason that this is happening after she eats greasy foods is that her gallbladder is out and it's harder for her body to digest the fat.  Rec. That she avoid foods that make the problem worse.  Reviewed all her normal lab tests with her.     Need for immunization against influenza  -     Influenza, Seasonal,Quad Inj, 36+ MOS    History of cholecystectomy- causing diarrhea.     Other chronic gastritis without hemorrhage- currently no sx's, discussed avoiding ibuprofen and aspirin    Hypothyroidism, unspecified type- continue daily dose of levothyroxine    Vitamin D deficiency- continue daily supplement.     RTC 3 months for follow up, sooner prn.           Subjective:    Patient ID: Betsy Negron is a 53 y.o. female.    HPI:  Has been having diarrhea, saw Jami and Jami gave her some medicine for it, which has helped.  Happens after eating spicy or greasy foods.  Gets pain in lower abdomen and has to use the bathroom.  Doesn't get the diarrhea if she doesn't eat spicy or greasy foods. Jami told her that it may be because she has had her gallbladder out and that is why she is having diarrhea with greasy foods. She knows other people who have had their gallbladder out and don't have this problem. No pain in her upper abdomen.     When she can't sleep because of her anxiety of depression, she takes hersleeping medicaiton and that helps.    Takes her thyroid and depression medication daily.  Thinks that the depression medication is helping her.      The following portions of the patient's history were  reviewed and updated as appropriate: allergies, current medications, past family history, past medical history, past social history, past surgical history and problem list.    Review of Systems  12 sys rev neg other than HPI        Objective:    Physical Exam       Patient is in no apparent physical distress.  Vitals are as recorded.  Head and face are normal.  Conjunctiva are clear.  Neck is without adenopathy or masses.thyroid not enlarged.   Cardiovascular :  Regular rate and rhythm with no murmurs.  Lungs are clear with good air movement bilaterally.Abd soft, NT, no organomegaly or masses.  Extremities are without edema.  Gait is normal.  Skin is without rashes.  Mood ; slightly anxious.

## 2021-06-22 NOTE — PROGRESS NOTES
Assessment/Plan:        Diagnoses and all orders for this visit:    Major depressive disorder with single episode, in partial remission (H)  Stable.  She will continue her current medications.  She has been awaiting judges decision regarding her Social Security disability since July of this year and is very anxious and worried about that.  She struggles financially.  Her medications are helping her and she does not think she needs any adjustments in them.    Acquired hypothyroidism she takes her thyroid medication daily.  She notices that if she misses even one day she feels very tired.-She is not yet due to have her TSH checked.    Anxiety-stable.  She will continue her current medications.    Vitamin D deficiency she will continue her daily dose of high-dose vitamin D.-  -     cholecalciferol, vitamin D3, (VITAMIN D3) 5,000 unit Tab; Take 1 tablet (5,000 Units total) by mouth daily VITAMIN D3 5000 UNIT ORAL CAPSULE .  Dispense: 100 tablet; Refill: 3    Fatigue-since she started taking multivitamins, her fatigue has lessened and she has more energy.  She will continue to take 1 a day.  -     multivitamin (ONE A DAY) per tablet; Take 1 tablet by mouth daily.  Dispense: 120 tablet; Refill: 2    Other chronic gastritis without hemorrhage she she has no symptoms from this we will continue her current medications.-    She will follow-up with me in 3 months.  She will get her mammogram at that time as she is due in February of next year and will be seeing me in  March.        Subjective:    Patient ID: Betsy Negron is a 53 y.o. female.    HPI : Patient is seen today with the assistance of a professional .  She is doing well.  She has anxiety about her disability.  She went before the  6 months ago and still has not heard about her decision.  She struggles financially and has been able to work.  Her medications for depression and anxiety are helping her.  Her sleep is good most of the time and her appetite  is good.  She does take all her medications daily.  She notices that if she misses even one day of her thyroid medication she feels tired.  12 system review negative other than HPI  The following portions of the patient's history were reviewed and updated as appropriate: allergies, current medications, past family history, past medical history, past social history, past surgical history and problem list.    Review of Systems      12 system review negative other than the HPI    Objective:    Physical Exam       Patient is in no apparent physical distress.  Vitals are as recorded.  Head and face are normal.  Conjunctiva are clear.  Neck is without adenopathy or masses.  Thyroid not enlarged cardiovascular :  Regular rate and rhythm with no murmurs.  Lungs are clear with good air movement bilaterally.  Extremities are without edema.  Gait is normal.  Skin is without rashes.  Mood and affect: she appears slightly anxious.

## 2021-06-22 NOTE — PROGRESS NOTES
ASSESMENT AND PLAN:  Diagnoses and all orders for this visit:    1.  Right-sided low back pain with right-sided sciatica, unspecified chronicity  -  Four days of constant achy pain, 3/10, on right lower back pain with radiation to right thigh.  No vertebral tenderness.  Occasional mild paresthesia on upper thigh but not currently present.  Denies saddle paresthesia, loss of bowel/bladder function.  Neurovascular intact.    Ordered:  -     XR Lumbar Spine 2 or 3 VWS  -     gabapentin (NEURONTIN) 300 MG capsule  Dispense: 30 capsule; Refill: 0    2. Globus sensation  -  Started around 12/18/18.  Denies trauma/injury, specific foods.  -  No progression, non-tender.  Denies shortness of breath/wheezing, halitosis, dysphagia, odynophagia, jaw pain, chewing pain, ear pain.  Exam unremarkable.  -  Discuss referral to ENT.  Pt would like to wait for now. Discussed indications for emergent f/u.      3.  Hypothyroidism, unspecified type  -   Previous normal, 2.68, on 3/16/18.   Ordered:   -     Thyroid [place]  Lab Results   Component Value Date    TSH 2.10 01/07/2019     4.  Health Maintenance  -  Pt is due for Pap.  Discussed cervical cancer.    -  Pt will make appt in 2 weeks.    Reviewed Medical/Social history and Medications.  See new changes above.  Over 40 minutes total time spent with patient, with more than 50% in counseling and coordination of care on the above issues.   Discussed indications for emergent medical attention and routine F/u.  Patient/Parent/Guardian engaged in decision making process and verbalized understanding of the options discussed and agreed with the final treatment plan.     SUBJECTIVE:  Betsy Negron is a 53 y.o. female who presents  for evaluation of her lower back pain x 4 days.     Pain is mild, 3/10, and constant with radiation to upper right thigh.  She denies weakness or gait disturbance, injury/trauma, bladder/bowel loss, fever/chills,  n/v, abdominal pain, diarrhea/constipation.  Pt  "unsure if anything makes it better or worse.      She also reports globus sensation in her throat since 12/18/18, \"I can feel it when I swallow, like something is scratching or hanging there.  But I don't notice it if I just stay still, only when I swallow.\"    Denies shortness of breath/wheezing, halitosis, dysphagia, odynophagia, jaw/teeth pain, pain with chewing, ear pain.  Exam unremarkable.  We discussed ENT referral but Pt said it doesn't bother her too much and would like to wait.  She will rtc if it worsens.  Discussed indications for emergent f/u.     ROS:  Comprehensive Review of Systems Negative except stated in HPI.     Past Medical History:   Diagnosis Date     Depression      GERD (gastroesophageal reflux disease)      Hypothyroidism      Patient Active Problem List   Diagnosis     Hypothyroidism     Major depressive disorder, single episode     Colon polyp     Chronic gastritis     Rotator cuff tear, right     Vitamin D deficiency     Chronic bilateral low back pain without sciatica     Anxiety     Constipation, unspecified constipation type     History of cholecystectomy     Current Outpatient Medications   Medication Sig Dispense Refill     cholecalciferol, vitamin D3, (VITAMIN D3) 5,000 unit Tab Take 1 tablet (5,000 Units total) by mouth daily VITAMIN D3 5000 UNIT ORAL CAPSULE . 100 tablet 3     levothyroxine (SYNTHROID, LEVOTHROID) 75 MCG tablet TAKE 1 TABLET BY MOUTH DAILY 90 tablet 2     multivitamin (ONE A DAY) per tablet Take 1 tablet by mouth daily. 120 tablet 2     omeprazole (PRILOSEC) 20 MG capsule Take 1 capsule (20 mg total) by mouth daily. 90 capsule 2     sertraline (ZOLOFT) 50 MG tablet Take 1 tablet by mouth daily.  5     traZODone (DESYREL) 50 MG tablet Take 1 tablet by mouth bedtime.  1     gabapentin (NEURONTIN) 300 MG capsule Take 1 capsule (300 mg total) by mouth 3 (three) times a day. 30 capsule 0     No current facility-administered medications for this visit.      Social " "History     Tobacco Use   Smoking Status Never Smoker   Smokeless Tobacco Never Used     OBJECTIVE: BP (!) 74/48   Pulse 66   Temp 98  F (36.7  C) (Oral)   Resp 24   Ht 4' 10.5\" (1.486 m)   Wt 121 lb 8 oz (55.1 kg)   SpO2 96%   BMI 24.96 kg/m     Recent Results (from the past 24 hour(s))   Thyroid [place]    Collection Time: 01/07/19  2:59 PM   Result Value Ref Range    TSH 2.10 0.30 - 5.00 uIU/mL       PHYSICAL:  General Alert, awake, not in acute distress.   HEENT:             -Head Atraumatic, normocephalic.            -Eyes PERRL, no erythema, discharge, conjunctiva clear.             -Ears TMs intact, no drainage, no erythema or edema.            -Nose    Nostrils patent,  no edema.            -Throat Oropharynx without edema, erythema.  Uvula midline, no deviation.             -Neck Neck FROM w/o tenderness, no adenopathy.  Thyroid not visibly enlarged.   CV Normal S1 & S2. No murmurs.   RESP Non-labored, RRR, CTAB. No wheezes or crackles.    ABDOMEN Soft,non-tender. No rigidity, guarding.  Normal bowel sounds.    EXTREMITY No edema, erythema, deformity. FROM.  Pulses present. Normal capillary refill.    BACK No step offs, no vertebral tenderness.  Paravertebral tenderness at right side of L5.    NEURO Grossly intact, no focal deficit. Sensation and Strength intact and symmetric in LE.  Oriented x 3.  Gait normal.  Negative Straight Leg raise.        Jami Cordova PA-C         "

## 2021-06-23 NOTE — TELEPHONE ENCOUNTER
Patient Returning Call  Reason for call: Returning phone call   Information relayed to patient: Below message relayed to patient.  Patient has additional questions:  Yes  If YES, what are your questions/concerns:  Patient requesting a return phone call. Patient is questioning what the provider means when she states abnormal pap. Please reach out to patient and advise via a [facility] interpretor.  Okay to leave a detailed message?: No

## 2021-06-23 NOTE — TELEPHONE ENCOUNTER
Called pt and left another message to call back to clinic.  Pap smear reveals abnormal cells undetermined which could place pt at risk to have HPV.  HPV test was negative.  This is why she needs to have repeat in one year.      Does RACHELE wish to letter sent to pt since unable to reach?  Thanks.

## 2021-06-23 NOTE — PROGRESS NOTES
"ASSESMENT AND PLAN:  1. Health maintenance examination  -  Hx of LSIL, HPV negative, on 10/1/2014.  Pt states she had colposcopy.  I was not able to find in Pt's chart.    Pt denies new or worsening sxs.  -  Pt is also due for Breast cancer screening and will have it today.   Ordered:   - Mammo Screening Bilateral; Future   - Gynecologic Cytology (PAP Smear)     2. Right Hip pain, F/u  -  Pt reports her Right hip pain has resolved completely after doing recommended stretch exercises and taking one dose of gabapentin.  Pt is very happy and thankful.     Reviewed Medical/Social history and Medications.  No new changes. Over 25 minutes total time spent with patient, with more than 50% in counseling and coordination of care on the above issues.   Discussed indications for emergent medical attention and routine F/u.  Patient/Parent/Guardian engaged in decision making process and verbalized understanding of the options discussed and agreed with the final treatment plan.     SUBJECTIVE:  Betsy Negron is a 53 y.o. female who presents  for evaluation of her Gynecologic Exam.    Pt has hx of LSIL with negative HPV in 2014.  She endorses having some type of procedure done, \"They cut something out but I don't know what they did.\"   Pt denies vaginal sxs, pelvic pain, new or worsening sxs.   She has no concerns for STD testing.     ROS:  Comprehensive Review of Systems Negative except stated in HPI.     Past Medical History:   Diagnosis Date     Depression      GERD (gastroesophageal reflux disease)      Hypothyroidism      Patient Active Problem List   Diagnosis     Hypothyroidism     Major depressive disorder, single episode     Colon polyp     Chronic gastritis     Rotator cuff tear, right     Vitamin D deficiency     Chronic bilateral low back pain without sciatica     Anxiety     Constipation, unspecified constipation type     History of cholecystectomy     Current Outpatient Medications   Medication Sig Dispense Refill     " "cholecalciferol, vitamin D3, (VITAMIN D3) 5,000 unit Tab Take 1 tablet (5,000 Units total) by mouth daily VITAMIN D3 5000 UNIT ORAL CAPSULE . 100 tablet 3     levothyroxine (SYNTHROID, LEVOTHROID) 75 MCG tablet TAKE 1 TABLET BY MOUTH DAILY 90 tablet 2     multivitamin (ONE A DAY) per tablet Take 1 tablet by mouth daily. 120 tablet 2     sertraline (ZOLOFT) 50 MG tablet Take 1 tablet by mouth daily.  5     traZODone (DESYREL) 50 MG tablet Take 1 tablet by mouth bedtime.  1     gabapentin (NEURONTIN) 300 MG capsule Take 1 capsule (300 mg total) by mouth 3 (three) times a day. 90 capsule 0     omeprazole (PRILOSEC) 20 MG capsule Take 1 capsule (20 mg total) by mouth daily. 90 capsule 2     No current facility-administered medications for this visit.      Social History     Tobacco Use   Smoking Status Never Smoker   Smokeless Tobacco Never Used     OBJECTIVE: BP 96/62   Pulse (!) 56   Temp 97.9  F (36.6  C) (Oral)   Resp 12   Ht 4' 10.5\" (1.486 m)   Wt 122 lb (55.3 kg)   SpO2 96%   BMI 25.06 kg/m     No results found for this or any previous visit (from the past 24 hour(s)).    PHYSICAL:  General Alert, awake, not in acute distress.   CV Normal S1 & S2. No murmurs.   RESP Non-labored, RRR, CTAB. No wheezes or crackles.        Jami Cordova PA-C         "

## 2021-06-23 NOTE — TELEPHONE ENCOUNTER
Called Pt and went over there abnormal pap results. Pt will f/u next year for recheck, or sooner if new or worsening sxs. No questions.

## 2021-06-23 NOTE — TELEPHONE ENCOUNTER
Will call pt on Monday, if no answer will send letter to pt home.  Thanks.       ----- Message from Jami Cordova PA-C sent at 2/1/2019 10:09 AM CST -----  Called Pt, no answer.  Please call Pt about abnormal pap results.  Pt should F/u in 1 year for recheck, or sooner if new symptoms.    Notes recorded by Jami Cordova PA-C on 1/29/2019 at 3:22 PM CST  Called Pt, no answer. Will try again later.

## 2021-06-23 NOTE — TELEPHONE ENCOUNTER
Will provider please call pt as she called back and has questions about abnormal pap smear?  Thanks.

## 2021-06-23 NOTE — TELEPHONE ENCOUNTER
RN cannot approve Refill Request    RN can NOT refill this medication new prescription 1/7/2019- PCP review for continued use.     Last office visit: 12/13/2018 Galina Reynolds MD Last Physical: 6/5/2015 Last MTM visit: Visit date not found Last visit same specialty: 1/7/2019 Jami Cordova PA-C.  Next visit within 3 mo: Visit date not found  Next physical within 3 mo: Visit date not found      Digna Hankins, [facility] Connection Triage/Med Refill 1/17/2019    Requested Prescriptions   Pending Prescriptions Disp Refills     gabapentin (NEURONTIN) 300 MG capsule 30 capsule 0     Sig: Take 1 capsule (300 mg total) by mouth 3 (three) times a day.    Gabapentin/Levetiracetam/Tiagabine Refill Protocol  Passed - 1/17/2019 12:00 PM       Passed - PCP or prescribing provider visit in past 12 months or next 3 months    Last office visit with prescriber/PCP: 12/13/2018 Galina Reynolds MD OR same dept: 1/7/2019 Jami Cordova PA-C OR same specialty: 1/7/2019 Jami Cordova PA-C  Last physical: 6/5/2015 Last MTM visit: Visit date not found   Next visit within 3 mo: Visit date not found  Next physical within 3 mo: Visit date not found  Prescriber OR PCP: Galina Reynolds MD  Last diagnosis associated with med order: 1. Right-sided low back pain with right-sided sciatica, unspecified chronicity  - gabapentin (NEURONTIN) 300 MG capsule; Take 1 capsule (300 mg total) by mouth 3 (three) times a day.  Dispense: 30 capsule; Refill: 0    If protocol passes may refill for 12 months if within 3 months of last provider visit (or a total of 15 months).

## 2021-06-23 NOTE — TELEPHONE ENCOUNTER
Called and spoke to Pt about abnormal pap. Pt will f/u in one year for recheck or sooner if sxs.   All questions answered.

## 2021-06-24 NOTE — PROGRESS NOTES
"ASSESMENT AND PLAN:  1. Sore throat  -  One week of itchy sore throat and few days of productive cough.  Pt has had dry cough x 3 months.  Also feels base of tongue is \"rough with little bumps\" at the base of tongue.  I explained that is usually normal texture of tongue.   -  Exam unremarkable. VS normal and stable. Lungs CTAB, no indication of Strep throat, Influenza, Pneumonia.  Most likely URI and seasonal allergies.  -  Follow up if symptoms not better or worsens.   -  Rapid Strep: NEGATIVE; Pt notified of results.    Orders:   - guaiFENesin ER (MUCINEX) 600 mg 12 hr tablet; Take 1 tablet (600 mg total) by mouth 2 (two) times a day.  Dispense: 60 tablet; Refill: 0   - loratadine (CLARITIN) 10 mg tablet; Take 1 tablet (10 mg total) by mouth daily.  Dispense: 30 tablet; Refill: 11   - Rapid Strep A Screen-Throat   - Group A Strep, RNA Direct Detection, Throat     Reviewed Medical/Social history and Medications. See new changes above.   Over 25 minutes total time spent with patient, with more than 50% in counseling and coordination of care on the above issues.   Discussed indications for emergent medical attention and routine F/u.  Patient/Parent/Guardian engaged in decision making process and verbalized understanding of the options discussed and agreed with the final treatment plan.     SUBJECTIVE:  Betsy Negron is a 53 y.o. female who presents  for evaluation of her Throat Pain x5 days  and Cough x 3 months.    Pt reports thick sticky mucous and has difficult time coughing it up, \"The base of my tongue is also itchy and I feel some rough and small little lumps on it.\"   Pt is worried it might be something bad and wants Abx. I explain the base of tongue is usually more rough and showed her pictures of tongue anatomy.  Told her I will get Strep test and if positive for infection then I will send in abx.      Pt denies fever/chills, wheezing, SOB, CP, n/v, abdominal pain, diarrhea/constipation.     ROS:  " "Comprehensive Review of Systems Negative except stated in HPI.     Past Medical History:   Diagnosis Date     Depression      GERD (gastroesophageal reflux disease)      Hypothyroidism      Patient Active Problem List   Diagnosis     Hypothyroidism     Major depressive disorder, single episode     Colon polyp     Chronic gastritis     Rotator cuff tear, right     Vitamin D deficiency     Chronic bilateral low back pain without sciatica     Anxiety     Constipation, unspecified constipation type     History of cholecystectomy     Current Outpatient Medications   Medication Sig Dispense Refill     acetaminophen (TYLENOL) 500 MG tablet 2 tablets three times daily as needed for pain 100 tablet 3     levothyroxine (SYNTHROID, LEVOTHROID) 75 MCG tablet TAKE 1 TABLET BY MOUTH DAILY 90 tablet 2     multivitamin (ONE A DAY) per tablet Take 1 tablet by mouth daily. 120 tablet 2     cholecalciferol, vitamin D3, (VITAMIN D3) 5,000 unit Tab Take 1 tablet (5,000 Units total) by mouth daily VITAMIN D3 5000 UNIT ORAL CAPSULE . 100 tablet 3     gabapentin (NEURONTIN) 300 MG capsule Take 1 capsule (300 mg total) by mouth 3 (three) times a day. 90 capsule 3     guaiFENesin ER (MUCINEX) 600 mg 12 hr tablet Take 1 tablet (600 mg total) by mouth 2 (two) times a day. 60 tablet 0     loratadine (CLARITIN) 10 mg tablet Take 1 tablet (10 mg total) by mouth daily. 30 tablet 11     omeprazole (PRILOSEC) 20 MG capsule Take 1 capsule (20 mg total) by mouth daily. 90 capsule 3     sertraline (ZOLOFT) 50 MG tablet Take 1 tablet by mouth daily.  5     traZODone (DESYREL) 50 MG tablet Take 1 tablet (50 mg total) by mouth at bedtime. 60 tablet 1     No current facility-administered medications for this visit.      Social History     Tobacco Use   Smoking Status Never Smoker   Smokeless Tobacco Never Used     OBJECTIVE: BP 98/70   Pulse (!) 54   Temp 97.7  F (36.5  C) (Oral)   Resp 20   Ht 4' 10.5\" (1.486 m)   Wt 122 lb (55.3 kg)   SpO2 96%   " BMI 25.06 kg/m     Recent Results (from the past 24 hour(s))   Rapid Strep A Screen-Throat    Collection Time: 03/05/19  8:38 AM   Result Value Ref Range    Rapid Strep A Antigen No Group A Strep detected, presumptive negative No Group A Strep detected, presumptive negative       PHYSICAL:  General Alert, awake, not in acute distress.   HEENT:             -Head Atraumatic, normocephalic.            -Eyes PERRL, no erythema, discharge, conjunctiva clear.             -Ears TMs intact, no drainage, no erythema or edema.            -Nose    Nostrils patent,  no edema.            -Throat Oropharynx without edema, erythema.  Uvula midline, no deviation.             -Neck Neck FROM, no adenopathy.  Thyroid not visibly enlarged.   CV Normal S1 & S2. No murmurs.   RESP Non-labored, RRR, CTAB. No wheezes or crackles.        Jami Cordova PA-C

## 2021-06-24 NOTE — TELEPHONE ENCOUNTER
Patient Returning Call  Reason for call:   Patient is calling  back  Information relayed to patient:   Called pt, no answer  Patient has additional questions:  Yes  If YES, what are your questions/concerns:  Please let her know when the culture comes back  Okay to leave a detailed message?: Yes

## 2021-06-24 NOTE — PROGRESS NOTES
Assessment/Plan:        Diagnoses and all orders for this visit:    Abdominal pain, right lower quadrant- resolved after drinking some herbal teas.    Reassured pt that this is not related to her abnl Pap smear.  If the pain returns or she has any other problems prior to her next appointment, she will let me know .    Anxiety-she is very worried today that her abnormal Pap smears not being treated and wants to know if there is any medication she can take for it.  I reassured her that there is nothing that needs to be done other than repeating it in 1 year.  I did explain to her that it was HPV negative and also with the meaning of the abnormal cells on the Pap are.  She was reassured by this.  She will continue with her current medications.    Right-sided low back pain with right-sided sciatica, unspecified chronicity-  She recently had pain in her low back an tingling in her legs again.  She thought it was related to the abdominal pain and the abnormal Pap smear.  She was very worried about this.  When she had the gabapentin before, it did help with her pain in her legs so I refilled that for her.     gabapentin (NEURONTIN) 300 MG capsule; Take 1 capsule (300 mg total) by mouth 3 (three) times a day.  Dispense: 90 capsule; Refill: 3    Chronic gastritis the omeprazole works well and she will continue to take it daily.-  -     omeprazole (PRILOSEC) 20 MG capsule; Take 1 capsule (20 mg total) by mouth daily.  Dispense: 90 capsule; Refill: 3    Major depressive disorder with single episode, in partial remission (H)-her current medications are helping and she will continue with those.  She is currently taking Zoloft and trazodone.    Psychophysiological insomnia-trazodone works well and she needs a refill of that.  She is not having any side effects from the medication .  -     traZODone (DESYREL) 50 MG tablet; Take 1 tablet (50 mg total) by mouth at bedtime.  Dispense: 60 tablet; Refill: 1    Chronic midline low back  pain with right-sided sciatica-she does not have anything like Tylenol or take.  The Neurontin does help but she takes it only when needed.  I told her she can take the Tylenol and explained how to take it.  -     acetaminophen (TYLENOL) 500 MG tablet; 2 tablets three times daily as needed for pain  Dispense: 100 tablet; Refill: 3    Acquired hypothyroidism-I reviewed her TSH that was done with her at her exam recently and that it was normal.  She will continue with her current dose of levothyroxine.      She will return for next scheduled visit and sooner as needed.  This was a 40-minute visit today with over 50% of the visit spent in education and counseling about the above conditions.    Subjective:    Patient ID: Betsy Negron is a 53 y.o. female.    HPI: Patient is here for abdominal pain that started 4 days ago in her right lower abdomen.  It then moved up into her right upper abdomen.  She is very worried that it is because of the abnormal results that she will phoned her about.  She is not sure if the abnormal results were from her cervix or her uterus.  She is very worried that there is something wrong.  She did not get any medication when she was told that she had an abnormality and is wondering if there is medication that can be used to treat it.    She has not had any nausea, vomiting, diarrhea or constipation with the abdominal pain.  She has had no fever or chills.  She has not had any blood in her stools.  She got some herbal tea after she had had the pain for 2 days and drink the tea and the pain has gone away.    At the same time she was having the pain.  She did have tingling at the top of her right leg and in her lower back.  She is concerned that this may also be related to the problem that she had in her uterus or her cervix.      She used to take Neurontin and that helps with the cramping that she gets in her feet and legs.  She would like a refill of that as she does not have it any  longer.    Trazodone helps with her sleep and she has been out of that.    The following portions of the patient's history were reviewed and updated as appropriate: allergies, current medications, past family history, past medical history, past social history, past surgical history and problem list.    Review of Systems      12 sys rev neg other than HPI    Objective:    Physical Exam       Patient is in no apparent physical distress.  Vitals are as recorded.  Head and face are normal.  Conjunctiva are clear.  Neck is without adenopathy or masses.thyroid not enlarged.   Cardiovascular :  Regular rate and rhythm with no murmurs.  Lungs are clear with good air movement bilaterally. Abd soft, NT, no organomegaly or masses.  Extremities are without edema.  Gait is normal.  Skin is without rashes. Psych.:  Appears anxious.

## 2021-06-25 NOTE — TELEPHONE ENCOUNTER
Former patient of Gail & has not established care with another provider.  Please assign refill request to covering provider per Clinic standard process.      RN cannot approve Refill Request    RN can NOT refill this medication no pcp. Last office visit: Visit date not found Last Physical: Visit date not found Last MTM visit: Visit date not found Last visit same specialty: 12/2/2020 Galina Reynolds MD.  Next visit within 3 mo: Visit date not found  Next physical within 3 mo: Visit date not found      Sarabjit Colon, [facility] Triage/Med Refill 6/7/2021    Requested Prescriptions   Pending Prescriptions Disp Refills     levothyroxine (SYNTHROID, LEVOTHROID) 75 MCG tablet [Pharmacy Med Name: LEVOTHYROXINE 0.075MG (75MCG) TABS] 90 tablet 2     Sig: TAKE 1 TABLET(75 MCG) BY MOUTH DAILY       Thyroid Hormones Protocol Passed - 6/5/2021  1:07 PM        Passed - Provider visit in past 12 months or next 3 months     Last office visit with prescriber/PCP: Visit date not found OR same dept: 12/2/2020 Galina Reynolds MD OR same specialty: 12/2/2020 Galina Reynolds MD  Last physical: Visit date not found Last MTM visit: Visit date not found   Next visit within 3 mo: Visit date not found  Next physical within 3 mo: Visit date not found  Prescriber OR PCP: Franc Wetzel MD  Last diagnosis associated with med order: 1. Hypothyroidism  - levothyroxine (SYNTHROID, LEVOTHROID) 75 MCG tablet [Pharmacy Med Name: LEVOTHYROXINE 0.075MG (75MCG) TABS]; TAKE 1 TABLET(75 MCG) BY MOUTH DAILY  Dispense: 90 tablet; Refill: 2    If protocol passes may refill for 12 months if within 3 months of last provider visit (or a total of 15 months).             Passed - TSH on file in past 12 months for patient age 12 & older     TSH   Date Value Ref Range Status   12/02/2020 1.98 0.30 - 5.00 uIU/mL Final

## 2021-07-03 NOTE — ADDENDUM NOTE
Addendum Note by Volodymyr Smith MD at 9/14/2020  2:40 PM     Author: Volodymyr Smith MD Service: -- Author Type: Physician    Filed: 9/15/2020  4:56 PM Encounter Date: 9/14/2020 Status: Signed    : Volodymyr Smith MD (Physician)    Addended by: VOLODYMYR SMITH on: 9/15/2020 04:56 PM        Modules accepted: Orders

## 2021-10-19 PROBLEM — F32.9 MAJOR DEPRESSION: Status: ACTIVE | Noted: 2020-12-02

## 2022-02-07 ENCOUNTER — OFFICE VISIT (OUTPATIENT)
Dept: FAMILY MEDICINE | Facility: CLINIC | Age: 57
End: 2022-02-07
Payer: COMMERCIAL

## 2022-02-07 VITALS
WEIGHT: 124 LBS | HEART RATE: 53 BPM | DIASTOLIC BLOOD PRESSURE: 66 MMHG | BODY MASS INDEX: 25 KG/M2 | RESPIRATION RATE: 16 BRPM | TEMPERATURE: 98 F | SYSTOLIC BLOOD PRESSURE: 100 MMHG | HEIGHT: 59 IN | OXYGEN SATURATION: 98 %

## 2022-02-07 DIAGNOSIS — R74.8 ELEVATED ALKALINE PHOSPHATASE LEVEL: ICD-10-CM

## 2022-02-07 DIAGNOSIS — E55.9 VITAMIN D DEFICIENCY: ICD-10-CM

## 2022-02-07 DIAGNOSIS — G57.92 NEUROPATHY OF LEFT LOWER EXTREMITY: Primary | ICD-10-CM

## 2022-02-07 DIAGNOSIS — K21.9 GASTROESOPHAGEAL REFLUX DISEASE WITHOUT ESOPHAGITIS: ICD-10-CM

## 2022-02-07 DIAGNOSIS — E03.9 HYPOTHYROIDISM, UNSPECIFIED TYPE: ICD-10-CM

## 2022-02-07 DIAGNOSIS — K29.50 OTHER CHRONIC GASTRITIS WITHOUT HEMORRHAGE: ICD-10-CM

## 2022-02-07 LAB
ALBUMIN SERPL-MCNC: 4.1 G/DL (ref 3.5–5)
ALP SERPL-CCNC: 105 U/L (ref 45–120)
ALT SERPL W P-5'-P-CCNC: 24 U/L (ref 0–45)
ANION GAP SERPL CALCULATED.3IONS-SCNC: 9 MMOL/L (ref 5–18)
AST SERPL W P-5'-P-CCNC: 17 U/L (ref 0–40)
BILIRUB SERPL-MCNC: 1 MG/DL (ref 0–1)
BUN SERPL-MCNC: 17 MG/DL (ref 8–22)
CALCIUM SERPL-MCNC: 8.9 MG/DL (ref 8.5–10.5)
CHLORIDE BLD-SCNC: 109 MMOL/L (ref 98–107)
CO2 SERPL-SCNC: 23 MMOL/L (ref 22–31)
CREAT SERPL-MCNC: 0.64 MG/DL (ref 0.6–1.1)
GFR SERPL CREATININE-BSD FRML MDRD: >90 ML/MIN/1.73M2
GLUCOSE BLD-MCNC: 96 MG/DL (ref 70–125)
POTASSIUM BLD-SCNC: 4.1 MMOL/L (ref 3.5–5)
PROT SERPL-MCNC: 6.9 G/DL (ref 6–8)
SODIUM SERPL-SCNC: 141 MMOL/L (ref 136–145)
TSH SERPL DL<=0.005 MIU/L-ACNC: 2.89 UIU/ML (ref 0.3–5)

## 2022-02-07 PROCEDURE — 36415 COLL VENOUS BLD VENIPUNCTURE: CPT | Performed by: FAMILY MEDICINE

## 2022-02-07 PROCEDURE — 90682 RIV4 VACC RECOMBINANT DNA IM: CPT | Performed by: FAMILY MEDICINE

## 2022-02-07 PROCEDURE — 99215 OFFICE O/P EST HI 40 MIN: CPT | Mod: 25 | Performed by: FAMILY MEDICINE

## 2022-02-07 PROCEDURE — 90471 IMMUNIZATION ADMIN: CPT | Performed by: FAMILY MEDICINE

## 2022-02-07 PROCEDURE — 84443 ASSAY THYROID STIM HORMONE: CPT | Performed by: FAMILY MEDICINE

## 2022-02-07 PROCEDURE — 80053 COMPREHEN METABOLIC PANEL: CPT | Performed by: FAMILY MEDICINE

## 2022-02-07 RX ORDER — IBUPROFEN 600 MG/1
TABLET, FILM COATED ORAL
Qty: 90 TABLET | Refills: 3 | Status: SHIPPED | OUTPATIENT
Start: 2022-02-07 | End: 2024-07-15

## 2022-02-07 ASSESSMENT — PATIENT HEALTH QUESTIONNAIRE - PHQ9: SUM OF ALL RESPONSES TO PHQ QUESTIONS 1-9: 5

## 2022-02-07 ASSESSMENT — MIFFLIN-ST. JEOR: SCORE: 1040.15

## 2022-02-07 NOTE — LETTER
My Depression Action Plan  Name: Betsy Negron   Date of Birth 1965  Date: 2/7/2022    My doctor: Galina Reynolds   My clinic: [facility] [address] [place] [id number] [phone]          GREEN    ZONE   Good Control    What it looks like:     Things are going generally well. You have normal ups and downs. You may even feel depressed from time to time, but bad moods usually last less than a day.   What you need to do:  1. Continue to care for yourself (see self care plan)  2. Check your depression survival kit and update it as needed  3. Follow your physician s recommendations including any medication.  4. Do not stop taking medication unless you consult with your physician first.           YELLOW         ZONE Getting Worse    What it looks like:     Depression is starting to interfere with your life.     It may be hard to get out of bed; you may be starting to isolate yourself from others.    Symptoms of depression are starting to last most all day and this has happened for several days.     You may have suicidal thoughts but they are not constant.   What you need to do:     1. Call your care team. Your response to treatment will improve if you keep your care team informed of your progress. Yellow periods are signs an adjustment may need to be made.     2. Continue your self-care.  Just get dressed and ready for the day.  Don't give yourself time to talk yourself out of it.    3. Talk to someone in your support network.    4. Open up your Depression Self-Care Plan/Wellness Kit.           RED    ZONE Medical Alert - Get Help    What it looks like:     Depression is seriously interfering with your life.     You may experience these or other symptoms: You can t get out of bed most days, can t work or engage in other necessary activities, you have trouble taking care of basic hygiene, or basic responsibilities, thoughts of suicide or death that will not go  away, self-injurious behavior.     What you need to do:  1. Call your care team and request a same-day appointment. If they are not available (weekends or after hours) call your local crisis line, emergency room or 911.          Depression Self-Care Plan / Wellness Kit    Many people find that medication and therapy are helpful treatments for managing depression. In addition, making small changes to your everyday life can help to boost your mood and improve your wellbeing. Below are some tips for you to consider. Be sure to talk with your medical provider and/or behavioral health consultant if your symptoms are worsening or not improving.     Sleep   Sleep hygiene  means all of the habits that support good, restful sleep. It includes maintaining a consistent bedtime and wake time, using your bedroom only for sleeping or sex, and keeping the bedroom dark and free of distractions like a computer, smartphone, or television.     Develop a Healthy Routine  Maintain good hygiene. Get out of bed in the morning, make your bed, brush your teeth, take a shower, and get dressed. Don t spend too much time viewing media that makes you feel stressed. Find time to relax each day.    Exercise  Get some form of exercise every day. This will help reduce pain and release endorphins, the  feel good  chemicals in your brain. It can be as simple as just going for a walk or doing some gardening, anything that will get you moving.      Diet  Strive to eat healthy foods, including fruits and vegetables. Drink plenty of water. Avoid excessive sugar, caffeine, alcohol, and other mood-altering substances.     Stay Connected with Others  Stay in touch with friends and family members.    Manage Your Mood  Try deep breathing, massage therapy, biofeedback, or meditation. Take part in fun activities when you can. Try to find something to smile about each day.     Psychotherapy  Be open to working with a therapist if your provider recommends it.      Medication  Be sure to take your medication as prescribed. Most anti-depressants need to be taken every day. It usually takes several weeks for medications to work. Not all medicines work for all people. It is important to follow-up with your provider to make sure you have a treatment plan that is working for you. Do not stop your medication abruptly without first discussing it with your provider.    Crisis Resources   These hotlines are for both adults and children. They and are open 24 hours a day, 7 days a week unless noted otherwise.      National Suicide Prevention Lifeline   4-113-788-TALK (3519)      Crisis Text Line    www.crisistextline.org  Text HOME to 445430 from anywhere in the [place], anytime, about any type of crisis. A live, trained crisis counselor will receive the text and respond quickly.      [facility] Lifeline for LGBTQ Youth  A national crisis intervention and suicide lifeline for LGBTQ youth under 25. Provides a safe place to talk without judgement. Call 1-898.512.1610; text START to 574281 or visit www.thetrevorproject.org to talk to a trained counselor.      For Community Health crisis numbers, visit the [facility] website at:  https://[place].gov/dhs/people-we-serve/adults/health-care/mental-health/resources/crisis-contacts.jsp

## 2022-02-07 NOTE — PROGRESS NOTES
"S: Betsy has pain in her left him going down into her groin and down all the way down the inner part of her leg for about a month.  Hurts more when she walks more.  No numbness or weakness in her hip or leg. Has tightness in her left lower pain, no pain pain.     No hx injury, hasn't had this pain before.  Doesn't interfere with sleep.      Has reddish yellow saliva when she wakes up in the morning and a bitter taste in her mouth.  Doesn't think she had that when she was taking the medication for her stomach acid- she has been out of it for a while and needs a refill.      Taking her thyroid medication every day.  Not taking the depression medication because she doesn't feel like she needs it.    ROS neg other than HPI  Past Medical, social, family histories, medications, and allergies reviewed and updated    Current Outpatient Medications   Medication Instructions     acetaminophen (TYLENOL) 500 MG tablet [ACETAMINOPHEN (TYLENOL) 500 MG TABLET] 2 tablets three times daily as needed for pain     ibuprofen (ADVIL/MOTRIN) 600 MG tablet Take one tablet three times daily as needed for pain- take with food.     levothyroxine (SYNTHROID, LEVOTHROID) 75 MCG tablet [LEVOTHYROXINE (SYNTHROID, LEVOTHROID) 75 MCG TABLET] TAKE 1 TABLET(75 MCG) BY MOUTH DAILY     omeprazole (PRILOSEC) 20 MG DR capsule [OMEPRAZOLE (PRILOSEC) 20 MG CAPSULE] Take one every morning 30 mins before eating     50 mg, Oral, DAILY     [TRAZODONE (DESYREL) 50 MG TABLET] Take one at bedtime as needed for insomnia.     vitamin D3 (CHOLECALCIFEROL) 125 mcg, Oral, WEEKLY     O:  /66   Pulse 53   Temp 98  F (36.7  C) (Oral)   Resp 16   Ht 1.486 m (4' 10.5\")   Wt 56.2 kg (124 lb)   SpO2 98%   Breastfeeding No   BMI 25.47 kg/m      Patient is in no apparent physical distress.  Head and face are normal, she is masked. Conjunctiva are clear.Mucous membranes clear, without lesions.  Pharynx clears, no edema, erythema, exudate.   Neck is without " adenopathy or masses.  Cardiovascular :  Regular rate and rhythm with no murmurs.  Lungs are clear with good air movement bilaterally.Low back non-tender, full ROM. Extremities are without edema.Nl ROM, reflexes, tone.   Gait is normal.  Skin is without rashes.  Mood and affect are appropriate.    Betsy was seen today for back pain, musculoskeletal problem, other and medication refill.    Diagnoses and all orders for this visit:    Neuropathy of left lower extremity- explained that this pain will like likely resolve with conservative therapy and time.  RTC if worsens or does not improve with treatment.   -     ibuprofen (ADVIL/MOTRIN) 600 MG tablet; Take one tablet three times daily as needed for pain- take with food.    Gastroesophageal reflux disease without esophagitis- I explained that the bitter taste in her mouth and the changes in her saliva are likely due to untreated reflux.  She will let me know if the sx's continue once she is back on her medication.   -     omeprazole (PRILOSEC) 20 MG DR capsule; [OMEPRAZOLE (PRILOSEC) 20 MG CAPSULE] Take one every morning 30 mins before eating    Hypothyroidism, unspecified type- no signs/sx's hypo or hyperthyroid.    -     TSH    Other chronic gastritis without hemorrhage- continue omeprazole daily.     Elevated alkaline phosphatase level  -     Comprehensive metabolic panel (BMP + Alb, Alk Phos, ALT, AST, Total. Bili, TP)    Vitamin D deficiency  -     vitamin D3 (CHOLECALCIFEROL) 125 MCG (5000 UT) tablet; Take 1 tablet (125 mcg) by mouth once a week    Other orders    -     INFLUENZA QUAD, PF (RIV4) (FLUBLOK)  -     REVIEW OF HEALTH MAINTENANCE PROTOCOL ORDERS    RTC 6 months, sooner prn.   Total 45  mins spent on visit on day of appt.   Pre-charting 4 mins  31 mins in room with pt.   10 mins. Spent charting after visit.

## 2022-02-08 ENCOUNTER — APPOINTMENT (OUTPATIENT)
Dept: INTERPRETER SERVICES | Facility: CLINIC | Age: 57
End: 2022-02-08
Payer: COMMERCIAL

## 2022-02-08 ENCOUNTER — TELEPHONE (OUTPATIENT)
Dept: FAMILY MEDICINE | Facility: CLINIC | Age: 57
End: 2022-02-08
Payer: COMMERCIAL

## 2022-02-08 NOTE — TELEPHONE ENCOUNTER
Writer talked with pt via  in regards to her recent lab work.  Informed patient that her labs were normal.    Tanvi ADAME RN  [facility]

## 2022-11-21 ENCOUNTER — OFFICE VISIT (OUTPATIENT)
Dept: FAMILY MEDICINE | Facility: CLINIC | Age: 57
End: 2022-11-21
Payer: COMMERCIAL

## 2022-11-21 ENCOUNTER — ANCILLARY PROCEDURE (OUTPATIENT)
Dept: GENERAL RADIOLOGY | Facility: CLINIC | Age: 57
End: 2022-11-21
Attending: FAMILY MEDICINE
Payer: COMMERCIAL

## 2022-11-21 VITALS
RESPIRATION RATE: 16 BRPM | BODY MASS INDEX: 24.19 KG/M2 | HEART RATE: 63 BPM | DIASTOLIC BLOOD PRESSURE: 64 MMHG | WEIGHT: 120 LBS | OXYGEN SATURATION: 96 % | TEMPERATURE: 98 F | HEIGHT: 59 IN | SYSTOLIC BLOOD PRESSURE: 90 MMHG

## 2022-11-21 DIAGNOSIS — M54.50 CHRONIC BILATERAL LOW BACK PAIN WITHOUT SCIATICA: ICD-10-CM

## 2022-11-21 DIAGNOSIS — H53.8 BLURRED VISION: ICD-10-CM

## 2022-11-21 DIAGNOSIS — R25.2 CRAMP OF LIMB: Primary | ICD-10-CM

## 2022-11-21 DIAGNOSIS — G89.29 CHRONIC BILATERAL LOW BACK PAIN WITHOUT SCIATICA: ICD-10-CM

## 2022-11-21 LAB
ALBUMIN SERPL BCG-MCNC: 4.2 G/DL (ref 3.5–5.2)
ALP SERPL-CCNC: 94 U/L (ref 35–104)
ALT SERPL W P-5'-P-CCNC: 15 U/L (ref 10–35)
ANION GAP SERPL CALCULATED.3IONS-SCNC: 9 MMOL/L (ref 7–15)
AST SERPL W P-5'-P-CCNC: 25 U/L (ref 10–35)
BILIRUB SERPL-MCNC: 0.9 MG/DL
BUN SERPL-MCNC: 19.5 MG/DL (ref 8–23)
CALCIUM SERPL-MCNC: 9 MG/DL (ref 8.6–10)
CHLORIDE SERPL-SCNC: 107 MMOL/L (ref 98–107)
CREAT SERPL-MCNC: 0.59 MG/DL (ref 0.51–0.95)
DEPRECATED HCO3 PLAS-SCNC: 25 MMOL/L (ref 22–29)
ERYTHROCYTE [DISTWIDTH] IN BLOOD BY AUTOMATED COUNT: 12.1 % (ref 10–15)
GFR SERPL CREATININE-BSD FRML MDRD: >90 ML/MIN/1.73M2
GLUCOSE SERPL-MCNC: 85 MG/DL (ref 70–99)
HCT VFR BLD AUTO: 45 % (ref 35–47)
HGB BLD-MCNC: 15.1 G/DL (ref 11.7–15.7)
MAGNESIUM SERPL-MCNC: 2.2 MG/DL (ref 1.7–2.3)
MCH RBC QN AUTO: 30 PG (ref 26.5–33)
MCHC RBC AUTO-ENTMCNC: 33.6 G/DL (ref 31.5–36.5)
MCV RBC AUTO: 90 FL (ref 78–100)
PLATELET # BLD AUTO: 184 10E3/UL (ref 150–450)
POTASSIUM SERPL-SCNC: 3.7 MMOL/L (ref 3.4–5.3)
PROT SERPL-MCNC: 6.7 G/DL (ref 6.4–8.3)
RBC # BLD AUTO: 5.03 10E6/UL (ref 3.8–5.2)
SODIUM SERPL-SCNC: 141 MMOL/L (ref 136–145)
WBC # BLD AUTO: 5.7 10E3/UL (ref 4–11)

## 2022-11-21 PROCEDURE — 85027 COMPLETE CBC AUTOMATED: CPT | Performed by: FAMILY MEDICINE

## 2022-11-21 PROCEDURE — 99214 OFFICE O/P EST MOD 30 MIN: CPT | Mod: 25 | Performed by: FAMILY MEDICINE

## 2022-11-21 PROCEDURE — 90682 RIV4 VACC RECOMBINANT DNA IM: CPT | Performed by: FAMILY MEDICINE

## 2022-11-21 PROCEDURE — 72100 X-RAY EXAM L-S SPINE 2/3 VWS: CPT | Mod: TC | Performed by: STUDENT IN AN ORGANIZED HEALTH CARE EDUCATION/TRAINING PROGRAM

## 2022-11-21 PROCEDURE — 83735 ASSAY OF MAGNESIUM: CPT | Performed by: FAMILY MEDICINE

## 2022-11-21 PROCEDURE — 36415 COLL VENOUS BLD VENIPUNCTURE: CPT | Performed by: FAMILY MEDICINE

## 2022-11-21 PROCEDURE — 80053 COMPREHEN METABOLIC PANEL: CPT | Performed by: FAMILY MEDICINE

## 2022-11-21 PROCEDURE — 90471 IMMUNIZATION ADMIN: CPT | Performed by: FAMILY MEDICINE

## 2022-11-21 ASSESSMENT — ENCOUNTER SYMPTOMS
BACK PAIN: 1
EYE PAIN: 1

## 2022-11-21 ASSESSMENT — PATIENT HEALTH QUESTIONNAIRE - PHQ9
10. IF YOU CHECKED OFF ANY PROBLEMS, HOW DIFFICULT HAVE THESE PROBLEMS MADE IT FOR YOU TO DO YOUR WORK, TAKE CARE OF THINGS AT HOME, OR GET ALONG WITH OTHER PEOPLE: SOMEWHAT DIFFICULT
SUM OF ALL RESPONSES TO PHQ QUESTIONS 1-9: 4
SUM OF ALL RESPONSES TO PHQ QUESTIONS 1-9: 4

## 2022-11-21 NOTE — PROGRESS NOTES
"  Assessment & Plan     Blurred vision  Needs comprehensive eye exam, referral placed.   - Adult Eye  Referral    Cramp of limb  Fingers>toes. Labs as below to look for anemia or electrolyte abnormality. If unremarkable labs, monitor vs ortho consult if she prefers.   - Comprehensive metabolic panel (BMP + Alb, Alk Phos, ALT, AST, Total. Bili, TP)  - Magnesium  - CBC with platelets    Chronic bilateral low back pain without sciatica  Lumbar spine xray today and likely next step is PT referral (once results available and reviewed).   - XR Lumbar Spine 2/3 Views                 No follow-ups on file.    Shabana Erwin MD  [facility] SHANNON Meyer is a 57 year old, presenting for the following health issues:  Back Pain, Musculoskeletal Problem (Hand and fingers cramps and tends), and Eye Problem (Vision change)    Back pain - low back, almost a year. Worse if having to bend for any type of chore. No pain radiating to legs, no weakness or numbness. Does not recall any injury or inciting event.      Fingers hurt - cramping/crossing (?), stiff. Gets stuck in flexion, Does not trigger, but needs to massage hand for a while and then is able to straighten back out. Sounds like she is describing cramping. Difficult to get history using phone . Cramping/stiffness can also happen w toes as well. Bothering since Sept, and more frequent now.      Vision - hard to see in bright light, then when she goes inside, still hard to see, \"cloudy\". Has not seen eye doctor ever per patient. Watery eyes in the morning. Both symptoms she has noticed since July.           Review of Systems   Eyes: Positive for pain.   Musculoskeletal: Positive for back pain.            Objective    BP 90/64 (BP Location: Right arm)   Pulse 63   Temp 98  F (36.7  C) (Oral)   Resp 16   Ht 1.486 m (4' 10.5\")   Wt 54.4 kg (120 lb)   SpO2 96%   BMI 24.65 kg/m    Body mass index is 24.65 kg/m .  Physical " Exam   GENERAL: healthy, alert and no distress  EYES: Eyes grossly normal to inspection, PERRL and conjunctivae and sclerae normal  RESP: lungs clear to auscultation - no rales, rhonchi or wheezes  CV: regular rate and rhythm, normal S1 S2, no S3 or S4, no murmur, click or rub, no peripheral edema  MS: no gross musculoskeletal defects noted, no edema. Inspection of joints of hands is normal - no swelling, no erythema, no focal warmth. Full ROM of fingers, no triggering or stiffness. Inspection of lower back - no deformity, no skin changes. No midline spine tenderness to palpation. No SI joint tenderness.

## 2022-11-23 ENCOUNTER — TELEPHONE (OUTPATIENT)
Dept: FAMILY MEDICINE | Facility: CLINIC | Age: 57
End: 2022-11-23

## 2022-11-23 NOTE — TELEPHONE ENCOUNTER
----- Message from Shabana Erwin MD sent at 11/23/2022  8:05 AM CST -----  Please call w results - Normal blood tests and normal spine xray. Physical therapy should help with the back pain - I have placed referral.

## 2022-11-23 NOTE — TELEPHONE ENCOUNTER
Marlo Negron RN   11/23/2022  8:21 AM CST Back to Top      Called placed to patient in attempt to relay test result message from provider.  However, no answer.  Message left on vm requesting a return call with clinic number provided.     GER Holman, RN  [facility]

## 2022-11-25 NOTE — TELEPHONE ENCOUNTER
Called pt with assistance from . relayed message from provider. Pt verbalized understanding.    Deven Andrews BSN RN  [facility]

## 2022-12-19 ENCOUNTER — OFFICE VISIT (OUTPATIENT)
Dept: OPTOMETRY | Facility: CLINIC | Age: 57
End: 2022-12-19
Attending: FAMILY MEDICINE
Payer: COMMERCIAL

## 2022-12-19 DIAGNOSIS — H53.8 BLURRED VISION: ICD-10-CM

## 2022-12-19 DIAGNOSIS — H52.223 REGULAR ASTIGMATISM OF BOTH EYES: ICD-10-CM

## 2022-12-19 DIAGNOSIS — H25.13 AGE-RELATED NUCLEAR CATARACT OF BOTH EYES: Primary | ICD-10-CM

## 2022-12-19 DIAGNOSIS — H52.03 HYPEROPIA, BILATERAL: ICD-10-CM

## 2022-12-19 DIAGNOSIS — H52.4 PRESBYOPIA: ICD-10-CM

## 2022-12-19 PROCEDURE — 92015 DETERMINE REFRACTIVE STATE: CPT | Performed by: OPTOMETRIST

## 2022-12-19 PROCEDURE — 92004 COMPRE OPH EXAM NEW PT 1/>: CPT | Performed by: OPTOMETRIST

## 2022-12-19 ASSESSMENT — VISUAL ACUITY
OS_PH_SC: 20/70
OS_SC: 20/200
OD_PH_SC: 20/70
OD_SC+: -1
OS_SC: 20/80
OD_PH_SC+: -2
OD_SC: 20/400
METHOD: NUMBERS - LINEAR
OD_SC: 20/150

## 2022-12-19 ASSESSMENT — KERATOMETRY
OS_AXISANGLE_DEGREES: 021
OS_K2POWER_DIOPTERS: 44.75
OS_K1POWER_DIOPTERS: 44.25
OD_AXISANGLE2_DEGREES: 038
OD_K1POWER_DIOPTERS: 44.25
OD_K2POWER_DIOPTERS: 44.75
OD_AXISANGLE_DEGREES: 128
OS_AXISANGLE2_DEGREES: 111

## 2022-12-19 ASSESSMENT — CONF VISUAL FIELD
OD_SUPERIOR_NASAL_RESTRICTION: 0
OD_INFERIOR_TEMPORAL_RESTRICTION: 0
OS_SUPERIOR_TEMPORAL_RESTRICTION: 0
OD_NORMAL: 1
OS_NORMAL: 1
OS_INFERIOR_TEMPORAL_RESTRICTION: 0
METHOD: COUNTING FINGERS
OD_INFERIOR_NASAL_RESTRICTION: 0
OD_SUPERIOR_TEMPORAL_RESTRICTION: 0
OS_INFERIOR_NASAL_RESTRICTION: 0
OS_SUPERIOR_NASAL_RESTRICTION: 0

## 2022-12-19 ASSESSMENT — REFRACTION_MANIFEST
OS_SPHERE: +0.75
OD_CYLINDER: +0.50
OD_AXIS: 031
OD_CYLINDER: +0.50
OS_ADD: +2.50
OD_AXIS: 031
OS_SPHERE: +0.75
OD_SPHERE: +1.00
OS_AXIS: 013
OS_CYLINDER: +1.00
OS_AXIS: 013
OD_ADD: +2.50
OS_CYLINDER: +1.50
OD_SPHERE: +1.50

## 2022-12-19 ASSESSMENT — CUP TO DISC RATIO
OS_RATIO: 0.45
OD_RATIO: 0.45

## 2022-12-19 ASSESSMENT — TONOMETRY
OS_IOP_MMHG: 18
OD_IOP_MMHG: 18
IOP_METHOD: APPLANATION

## 2022-12-19 ASSESSMENT — EXTERNAL EXAM - LEFT EYE: OS_EXAM: NORMAL

## 2022-12-19 ASSESSMENT — SLIT LAMP EXAM - LIDS
COMMENTS: NORMAL
COMMENTS: NORMAL

## 2022-12-19 ASSESSMENT — EXTERNAL EXAM - RIGHT EYE: OD_EXAM: NORMAL

## 2022-12-19 NOTE — LETTER
12/19/2022         RE: Betsy Negron  [address]  [place] [address]        Dear Colleague,    Thank you for referring your patient, Betsy Negron, to the [facility]. Please see a copy of my visit note below.    Chief Complaint   Patient presents with     Annual Eye Exam         Last Eye Exam: 2+ yrs  Dilated Previously: Yes, side effects of dilation explained today     via iPad was used during pre examination    What are you currently using to see?  does not use glasses, she states she lost glasses.      Distance Vision Acuity: Blurred vision each eye.  Sometimes on santosh days she states she has cloudy vision .    Near Vision Acuity: She uses OTC readers for sewing and reading small print.     Eye Comfort: watery  Do you use eye drops? : No  Occupation or Hobbies:     Angela Cordova, OA           Medical, surgical and family histories reviewed and updated 12/19/2022.       OBJECTIVE: See Ophthalmology exam    ASSESSMENT:    ICD-10-CM    1. Age-related nuclear cataract of both eyes - Both Eyes  H25.13 REFRACTIVE STATUS     Adult Eye  Referral      2. Blurred vision - Both Eyes  H53.8 Adult Eye  Referral     REFRACTIVE STATUS     Adult Eye  Referral      3. Hyperopia, bilateral - Both Eyes  H52.03 REFRACTIVE STATUS     Adult Eye  Referral      4. Regular astigmatism of both eyes - Both Eyes  H52.223 REFRACTIVE STATUS     Adult Eye  Referral      5. Presbyopia - Both Eyes  H52.4 REFRACTIVE STATUS     Adult Eye  Referral          PLAN:     Patient Instructions   You have cataracts.  You may notice some blurred vision or glare with night driving.  It is important that you wear good sunglasses to protect your eyes from the ultraviolet light from the sun.  I recommend that you return in 1 year for an eye exam unless there are any sudden changes in your vision.         Dr. Amauri Glynn is and eye surgeon at [facility] in [place]. His office will call you to make an appointment.  Their number is [phone].     Hyperopia is far-sightedness. Hyperopia is commonly rooted from a petite eye length. That results in the light's focus behind the retina.     Astigmatism results from curvature differential in the cornea and crystalline lens which can cause a distorted image, as light rays are prevented from meeting at a common focus.    Presbyopia is the diagnosis. Presbyopia is an age-related condition where the eye's crystalline lens doesn't change shape as easily as it once did.    The affects of the dilating drops last for 4- 6 hours.  You will be more sensitive to light and vision will be blurry up close.  Do not drive if you do not feel comfortable.  Mydriatic sunglasses were given if needed.    Recommend annual eye exams.    Cleo Wagner O.D.  [facility]   [address] [place] [address]    [phone]                 Again, thank you for allowing me to participate in the care of your patient.        Sincerely,        Cleo Wagner, OD

## 2022-12-19 NOTE — PATIENT INSTRUCTIONS
You have cataracts.  You may notice some blurred vision or glare with night driving.  It is important that you wear good sunglasses to protect your eyes from the ultraviolet light from the sun.  I recommend that you return in 1 year for an eye exam unless there are any sudden changes in your vision.         Dr. Amauri Glynn is and eye surgeon at [facility] in [place]. His office will call you to make an appointment.  Their number is [phone].     Hyperopia is far-sightedness. Hyperopia is commonly rooted from a petite eye length. That results in the light's focus behind the retina.     Astigmatism results from curvature differential in the cornea and crystalline lens which can cause a distorted image, as light rays are prevented from meeting at a common focus.    Presbyopia is the diagnosis. Presbyopia is an age-related condition where the eye's crystalline lens doesn't change shape as easily as it once did.    The affects of the dilating drops last for 4- 6 hours.  You will be more sensitive to light and vision will be blurry up close.  Do not drive if you do not feel comfortable.  Mydriatic sunglasses were given if needed.    Recommend annual eye exams.    Cleo Wagner O.D.  [address] [id number] [phone]

## 2022-12-19 NOTE — PROGRESS NOTES
Chief Complaint   Patient presents with     Annual Eye Exam         Last Eye Exam: 2+ yrs  Dilated Previously: Yes, side effects of dilation explained today     via iPad was used during pre examination    What are you currently using to see?  does not use glasses, she states she lost glasses.      Distance Vision Acuity: Blurred vision each eye.  Sometimes on santosh days she states she has cloudy vision .    Near Vision Acuity: She uses OTC readers for sewing and reading small print.     Eye Comfort: watery  Do you use eye drops? : No  Occupation or Hobbies:     Angela Cordova, OA           Medical, surgical and family histories reviewed and updated 12/19/2022.       OBJECTIVE: See Ophthalmology exam    ASSESSMENT:    ICD-10-CM    1. Age-related nuclear cataract of both eyes - Both Eyes  H25.13 REFRACTIVE STATUS     Adult Eye  Referral      2. Blurred vision - Both Eyes  H53.8 Adult Eye  Referral     REFRACTIVE STATUS     Adult Eye  Referral      3. Hyperopia, bilateral - Both Eyes  H52.03 REFRACTIVE STATUS     Adult Eye  Referral      4. Regular astigmatism of both eyes - Both Eyes  H52.223 REFRACTIVE STATUS     Adult Eye  Referral      5. Presbyopia - Both Eyes  H52.4 REFRACTIVE STATUS     Adult Eye  Referral          PLAN:     Patient Instructions   You have cataracts.  You may notice some blurred vision or glare with night driving.  It is important that you wear good sunglasses to protect your eyes from the ultraviolet light from the sun.  I recommend that you return in 1 year for an eye exam unless there are any sudden changes in your vision.         Dr. Amauri Glynn is and eye surgeon at [facility] in [place]. His office will call you to make an appointment.  Their number is [phone].     Hyperopia is far-sightedness. Hyperopia is commonly rooted from a petite eye length. That results in the light's focus behind the retina.     Astigmatism  results from curvature differential in the cornea and crystalline lens which can cause a distorted image, as light rays are prevented from meeting at a common focus.    Presbyopia is the diagnosis. Presbyopia is an age-related condition where the eye's crystalline lens doesn't change shape as easily as it once did.    The affects of the dilating drops last for 4- 6 hours.  You will be more sensitive to light and vision will be blurry up close.  Do not drive if you do not feel comfortable.  Mydriatic sunglasses were given if needed.    Recommend annual eye exams.    Cleo Wagner O.D.  [facility]   [address] [place] [address]    [phone]

## 2023-03-10 ENCOUNTER — OFFICE VISIT (OUTPATIENT)
Dept: OPHTHALMOLOGY | Facility: CLINIC | Age: 58
End: 2023-03-10
Attending: OPHTHALMOLOGY
Payer: COMMERCIAL

## 2023-03-10 ENCOUNTER — TELEPHONE (OUTPATIENT)
Dept: OPHTHALMOLOGY | Facility: CLINIC | Age: 58
End: 2023-03-10

## 2023-03-10 DIAGNOSIS — H25.13 AGE-RELATED NUCLEAR CATARACT OF BOTH EYES: Primary | ICD-10-CM

## 2023-03-10 PROCEDURE — 76519 ECHO EXAM OF EYE: CPT | Performed by: OPHTHALMOLOGY

## 2023-03-10 PROCEDURE — 92025 CPTRIZED CORNEAL TOPOGRAPHY: CPT | Performed by: OPHTHALMOLOGY

## 2023-03-10 PROCEDURE — G0463 HOSPITAL OUTPT CLINIC VISIT: HCPCS | Mod: 25 | Performed by: OPHTHALMOLOGY

## 2023-03-10 PROCEDURE — 99204 OFFICE O/P NEW MOD 45 MIN: CPT | Mod: GC | Performed by: OPHTHALMOLOGY

## 2023-03-10 ASSESSMENT — CONF VISUAL FIELD
OD_INFERIOR_TEMPORAL_RESTRICTION: 0
OD_NORMAL: 1
OS_SUPERIOR_NASAL_RESTRICTION: 0
OS_INFERIOR_NASAL_RESTRICTION: 0
OS_SUPERIOR_TEMPORAL_RESTRICTION: 0
OS_INFERIOR_TEMPORAL_RESTRICTION: 0
OS_NORMAL: 1
OD_SUPERIOR_TEMPORAL_RESTRICTION: 0
METHOD: COUNTING FINGERS
OD_INFERIOR_NASAL_RESTRICTION: 0
OD_SUPERIOR_NASAL_RESTRICTION: 0

## 2023-03-10 ASSESSMENT — SLIT LAMP EXAM - LIDS
COMMENTS: NORMAL
COMMENTS: NORMAL

## 2023-03-10 ASSESSMENT — TONOMETRY
OS_IOP_MMHG: 12
OD_IOP_MMHG: 14
IOP_METHOD: TONOPEN

## 2023-03-10 ASSESSMENT — VISUAL ACUITY
METHOD: NUMBERS - LINEAR
METHOD_MR: DIAGNOSTIC REFRACTION
OS_SC: 20/125
OS_PH_SC: 20/70
OD_SC: 20/200
OD_PH_SC: 20/40

## 2023-03-10 ASSESSMENT — REFRACTION_MANIFEST
OD_SPHERE: +0.25
OD_ADD: +2.25
OS_AXIS: 045
OS_SPHERE: +0.75
OD_AXIS: 125
OS_CYLINDER: +0.25
OD_CYLINDER: +1.25
OS_ADD: +2.25

## 2023-03-10 ASSESSMENT — CUP TO DISC RATIO
OS_RATIO: 0.5
OD_RATIO: 0.45

## 2023-03-10 ASSESSMENT — EXTERNAL EXAM - RIGHT EYE: OD_EXAM: NORMAL

## 2023-03-10 ASSESSMENT — EXTERNAL EXAM - LEFT EYE: OS_EXAM: NORMAL

## 2023-03-10 NOTE — PROGRESS NOTES
Chief complaint     Chief Complaints and History of Present Illnesses   Patient presents with     Cataract Evaluation       Referring Provider: Cleo Wagner, OD     HPI    Plpeace Negron 57 year old female with a history of bilateral cataracts presents as referral for consideration of cataract surgery. Patient was seen 2022 by Dr. Adams Wagner and it was noted that the patient had limited BCVA each eye in the setting of bilateral cataracts.      Patient notes difficulty with visual acuity, gradually more blurry over time. No sudden changes. No dark curtains, flashes, or floaters.          Past ocular history   Prior eye surgery/laser/Trauma: No  CTL wearer: None  Glasses : Reading glasses only.   Family Hx of eye disease: None    PMH     Past Medical History:   Diagnosis Date     Depression      GERD (gastroesophageal reflux disease)      Hypothyroidism      Nonsenile cataract        PSH     Past Surgical History:   Procedure Laterality Date      SECTION       REPAIR PTOSIS         Meds     Current Outpatient Medications   Medication     ibuprofen (ADVIL/MOTRIN) 600 MG tablet     levothyroxine (SYNTHROID/LEVOTHROID) 75 MCG tablet     omeprazole (PRILOSEC) 20 MG DR capsule     vitamin D3 (CHOLECALCIFEROL) 125 MCG (5000 UT) tablet     No current facility-administered medications for this visit.       Labs   -    Imaging   -    Drops Currently Taking   None    Assessment/Plan     #Senile Cataracts OU  Patient is having difficulty with daily activities due to visual impairment. Clearly told patient that cataract surgery is NOT needed if managing with current vision. Patient wishes to proceed ahead with surgery. Explained benefits alternatives and risks including but not limited to loss of vision, severe infection, retinal detachment, need for more surgery, visual disturbances etc...  Informed patient that reaching uncorrected vision aim (without glasses) after cataract surgery is not certain. Made  patient aware they may need glasses, laser vision correction or in worst case scenario, IOL exchange.    Discussed with presence of     Dilates well  no PXF  no Flomax  no guttae    -Aim: to be decided, please call patient/family, may need     Multifocal vs monovision  Vs distance  Discussed the R/B/A of each type of lenses today  Patient is okay if we discussed the issue with her daughter for the lens selection    - Dominant eye: left eye  -Previous refractive surgery status:no  -Corneal astigmatism <1      Follow up:  Schedule cataract surgery.     Tadeo Lopez MD  Ophthalmology, PGY-3    Attending Physician Attestation:  Complete documentation of historical and exam elements from today's encounter can be found in the full encounter summary report (not reduplicated in this progress note).  I personally obtained the chief complaint(s) and history of present illness.  I confirmed and edited as necessary the review of systems, past medical/surgical history, family history, social history, and examination findings as documented by others; and I examined the patient myself.  I personally reviewed the relevant tests, images, and reports as documented above.  I formulated and edited as necessary the assessment and plan and discussed the findings and management plan with the patient and family. - Abhilash Nichols MD

## 2023-03-10 NOTE — NURSING NOTE
Chief Complaints and History of Present Illnesses   Patient presents with     Cataract Evaluation     Chief Complaint(s) and History of Present Illness(es)     Cataract Evaluation            Laterality: both eyes    Associated symptoms: blurred vision    Context: distance vision and near vision    Response to treatment: no improvement    Pain scale: 0/10          Comments    Betsy is here referred by Dr Lamas (Optometrist) for a cataract consultation. She says her vision is blurry, and has been for about a year. She does not wear glasses or contacts.    Denies flashes or floaters.     Juan Ramon Clements COT 7:40 AM March 10, 2023

## 2023-03-10 NOTE — PROGRESS NOTES
I attempted to contact Betsy's daughter as requested in today's examination to discuss the cataract surgery. No answer. Will call again on Monday for discussion.    Lyndon Tristan MD  Fellow, Cornea, External Disease and Refractive Surgery  Department of Ophthalmology  [facility]

## 2023-03-13 ENCOUNTER — TELEPHONE (OUTPATIENT)
Dept: OPHTHALMOLOGY | Facility: CLINIC | Age: 58
End: 2023-03-13
Payer: COMMERCIAL

## 2023-03-13 NOTE — TELEPHONE ENCOUNTER
Spoke with patient, she stated she would have her daughter call us to schedule.    Anna C. Schoenecker on 3/13/2023 at 8:47 AM

## 2023-03-15 ENCOUNTER — TELEPHONE (OUTPATIENT)
Dept: OPHTHALMOLOGY | Facility: CLINIC | Age: 58
End: 2023-03-15
Payer: COMMERCIAL

## 2023-03-15 NOTE — TELEPHONE ENCOUNTER
Spoke with patients daughter, she had multiple questions prior to scheduling that [facility] could not answer. Message sent to care team to reach out to family and discuss concerns     Anna C. Schoenecker on 3/15/2023 at 9:54 AM

## 2023-03-20 ENCOUNTER — TELEPHONE (OUTPATIENT)
Dept: OPHTHALMOLOGY | Facility: CLINIC | Age: 58
End: 2023-03-20
Payer: COMMERCIAL

## 2023-03-20 NOTE — TELEPHONE ENCOUNTER
I contacted the patient's daughter at the patient's request to discuss the patient's recent clinic visit. I informed her that the patient has visually significant cataracts in both eyes, and discussed that surgical intervention was recommended. I discussed the risks, benefits and alternatives of cataract extraction with intraocular lens implantation with the patient's daughter. Additionally, I discussed refractive goals and monofocal vs. Multifocal IOL possibilities. The patient's daughter wishes to discuss this conversation with the patient to determine what the patient's wishes area at this time. I recommended that she reach out to the clinic after this discussion takes place.     Lyndon Tristan MD  Fellow, Cornea, External Disease and Refractive Surgery  Department of Ophthalmology  [facility]

## 2023-03-22 ENCOUNTER — TELEPHONE (OUTPATIENT)
Dept: OPHTHALMOLOGY | Facility: CLINIC | Age: 58
End: 2023-03-22
Payer: COMMERCIAL

## 2023-03-24 ENCOUNTER — TELEPHONE (OUTPATIENT)
Dept: OPHTHALMOLOGY | Facility: CLINIC | Age: 58
End: 2023-03-24
Payer: COMMERCIAL

## 2023-03-27 ENCOUNTER — TELEPHONE (OUTPATIENT)
Dept: OPHTHALMOLOGY | Facility: CLINIC | Age: 58
End: 2023-03-27
Payer: COMMERCIAL

## 2023-03-28 ENCOUNTER — TELEPHONE (OUTPATIENT)
Dept: OPHTHALMOLOGY | Facility: CLINIC | Age: 58
End: 2023-03-28
Payer: COMMERCIAL

## 2023-05-02 ENCOUNTER — APPOINTMENT (OUTPATIENT)
Dept: INTERPRETER SERVICES | Facility: CLINIC | Age: 58
End: 2023-05-02
Payer: COMMERCIAL

## 2023-07-03 ENCOUNTER — OFFICE VISIT (OUTPATIENT)
Dept: FAMILY MEDICINE | Facility: CLINIC | Age: 58
End: 2023-07-03
Payer: COMMERCIAL

## 2023-07-03 VITALS
TEMPERATURE: 98.1 F | DIASTOLIC BLOOD PRESSURE: 62 MMHG | OXYGEN SATURATION: 96 % | HEART RATE: 60 BPM | HEIGHT: 59 IN | SYSTOLIC BLOOD PRESSURE: 101 MMHG | BODY MASS INDEX: 23.99 KG/M2 | RESPIRATION RATE: 24 BRPM | WEIGHT: 119 LBS

## 2023-07-03 DIAGNOSIS — R87.611 ATYPICAL SQUAMOUS CELLS CANNOT EXCLUDE HIGH GRADE SQUAMOUS INTRAEPITHELIAL LESION ON CYTOLOGIC SMEAR OF CERVIX (ASC-H): ICD-10-CM

## 2023-07-03 DIAGNOSIS — M54.50 CHRONIC RIGHT-SIDED LOW BACK PAIN WITHOUT SCIATICA: ICD-10-CM

## 2023-07-03 DIAGNOSIS — E03.9 HYPOTHYROIDISM, UNSPECIFIED TYPE: ICD-10-CM

## 2023-07-03 DIAGNOSIS — B96.89 BV (BACTERIAL VAGINOSIS): ICD-10-CM

## 2023-07-03 DIAGNOSIS — Z12.4 SCREENING FOR MALIGNANT NEOPLASM OF CERVIX: ICD-10-CM

## 2023-07-03 DIAGNOSIS — R85.9 SALIVA ABNORMAL: ICD-10-CM

## 2023-07-03 DIAGNOSIS — K21.9 GASTROESOPHAGEAL REFLUX DISEASE WITHOUT ESOPHAGITIS: Primary | ICD-10-CM

## 2023-07-03 DIAGNOSIS — G89.29 CHRONIC RIGHT-SIDED LOW BACK PAIN WITHOUT SCIATICA: ICD-10-CM

## 2023-07-03 DIAGNOSIS — N76.0 BV (BACTERIAL VAGINOSIS): ICD-10-CM

## 2023-07-03 LAB
ALBUMIN SERPL BCG-MCNC: 4.3 G/DL (ref 3.5–5.2)
ALBUMIN UR-MCNC: NEGATIVE MG/DL
ALP SERPL-CCNC: 96 U/L (ref 35–104)
ALT SERPL W P-5'-P-CCNC: 19 U/L (ref 0–50)
AMORPH CRY #/AREA URNS HPF: ABNORMAL /HPF
ANION GAP SERPL CALCULATED.3IONS-SCNC: 10 MMOL/L (ref 7–15)
APPEARANCE UR: CLEAR
AST SERPL W P-5'-P-CCNC: 27 U/L (ref 0–45)
BACTERIA #/AREA URNS HPF: ABNORMAL /HPF
BILIRUB SERPL-MCNC: 0.8 MG/DL
BILIRUB UR QL STRIP: NEGATIVE
BUN SERPL-MCNC: 25.1 MG/DL (ref 8–23)
CALCIUM SERPL-MCNC: 8.9 MG/DL (ref 8.8–10.2)
CHLORIDE SERPL-SCNC: 107 MMOL/L (ref 98–107)
CLUE CELLS: PRESENT
COLOR UR AUTO: YELLOW
CREAT SERPL-MCNC: 0.71 MG/DL (ref 0.51–0.95)
DEPRECATED HCO3 PLAS-SCNC: 25 MMOL/L (ref 22–29)
ERYTHROCYTE [DISTWIDTH] IN BLOOD BY AUTOMATED COUNT: 12 % (ref 10–15)
GFR SERPL CREATININE-BSD FRML MDRD: >90 ML/MIN/1.73M2
GLUCOSE SERPL-MCNC: 90 MG/DL (ref 70–99)
GLUCOSE UR STRIP-MCNC: NEGATIVE MG/DL
HCT VFR BLD AUTO: 43.9 % (ref 35–47)
HGB BLD-MCNC: 14.4 G/DL (ref 11.7–15.7)
HGB UR QL STRIP: ABNORMAL
KETONES UR STRIP-MCNC: NEGATIVE MG/DL
LEUKOCYTE ESTERASE UR QL STRIP: ABNORMAL
MCH RBC QN AUTO: 29.7 PG (ref 26.5–33)
MCHC RBC AUTO-ENTMCNC: 32.8 G/DL (ref 31.5–36.5)
MCV RBC AUTO: 91 FL (ref 78–100)
MUCOUS THREADS #/AREA URNS LPF: PRESENT /LPF
NITRATE UR QL: NEGATIVE
PH UR STRIP: 5.5 [PH] (ref 5–7)
PLATELET # BLD AUTO: 158 10E3/UL (ref 150–450)
POTASSIUM SERPL-SCNC: 3.3 MMOL/L (ref 3.4–5.3)
PROT SERPL-MCNC: 7.1 G/DL (ref 6.4–8.3)
RBC # BLD AUTO: 4.85 10E6/UL (ref 3.8–5.2)
RBC #/AREA URNS AUTO: ABNORMAL /HPF
SODIUM SERPL-SCNC: 142 MMOL/L (ref 136–145)
SP GR UR STRIP: 1.02 (ref 1–1.03)
SQUAMOUS #/AREA URNS AUTO: ABNORMAL /LPF
TRICHOMONAS, WET PREP: ABNORMAL
TSH SERPL DL<=0.005 MIU/L-ACNC: 3.93 UIU/ML (ref 0.3–4.2)
UROBILINOGEN UR STRIP-ACNC: 0.2 E.U./DL
WBC # BLD AUTO: 5.7 10E3/UL (ref 4–11)
WBC #/AREA URNS AUTO: ABNORMAL /HPF
WBC'S/HIGH POWER FIELD, WET PREP: ABNORMAL
YEAST, WET PREP: ABNORMAL

## 2023-07-03 PROCEDURE — 87210 SMEAR WET MOUNT SALINE/INK: CPT | Performed by: FAMILY MEDICINE

## 2023-07-03 PROCEDURE — 87491 CHLMYD TRACH DNA AMP PROBE: CPT | Performed by: FAMILY MEDICINE

## 2023-07-03 PROCEDURE — 85027 COMPLETE CBC AUTOMATED: CPT | Performed by: FAMILY MEDICINE

## 2023-07-03 PROCEDURE — 87624 HPV HI-RISK TYP POOLED RSLT: CPT | Performed by: FAMILY MEDICINE

## 2023-07-03 PROCEDURE — 88175 CYTOPATH C/V AUTO FLUID REDO: CPT | Performed by: FAMILY MEDICINE

## 2023-07-03 PROCEDURE — 84443 ASSAY THYROID STIM HORMONE: CPT | Performed by: FAMILY MEDICINE

## 2023-07-03 PROCEDURE — 81001 URINALYSIS AUTO W/SCOPE: CPT | Performed by: FAMILY MEDICINE

## 2023-07-03 PROCEDURE — 80053 COMPREHEN METABOLIC PANEL: CPT | Performed by: FAMILY MEDICINE

## 2023-07-03 PROCEDURE — 36415 COLL VENOUS BLD VENIPUNCTURE: CPT | Performed by: FAMILY MEDICINE

## 2023-07-03 PROCEDURE — 99214 OFFICE O/P EST MOD 30 MIN: CPT | Performed by: FAMILY MEDICINE

## 2023-07-03 PROCEDURE — 87591 N.GONORRHOEAE DNA AMP PROB: CPT | Performed by: FAMILY MEDICINE

## 2023-07-03 ASSESSMENT — PATIENT HEALTH QUESTIONNAIRE - PHQ9
SUM OF ALL RESPONSES TO PHQ QUESTIONS 1-9: 10
SUM OF ALL RESPONSES TO PHQ QUESTIONS 1-9: 10
10. IF YOU CHECKED OFF ANY PROBLEMS, HOW DIFFICULT HAVE THESE PROBLEMS MADE IT FOR YOU TO DO YOUR WORK, TAKE CARE OF THINGS AT HOME, OR GET ALONG WITH OTHER PEOPLE: SOMEWHAT DIFFICULT

## 2023-07-03 NOTE — PROGRESS NOTES
"  Assessment & Plan       Saliva abnormal  Normal oral exam, normal palpation of neck and jaw, no masses or tenderness. Suspect reflux, as she has described this same symptom in years past, and resolved w PPI. Labs as below and restart PPI as below, follow up in 3 weeks  - Comprehensive metabolic panel (BMP + Alb, Alk Phos, ALT, AST, Total. Bili, TP)  - CBC with platelets    Hypothyroidism, unspecified type  - TSH WITH FREE T4 REFLEX    Gastroesophageal reflux disease without esophagitis  Follow up in 3 weeks after starting PPI to ensure improvement  - omeprazole (PRILOSEC) 20 MG DR capsule  Dispense: 90 capsule; Refill: 3    Chronic right-sided low back pain without sciatica  Suspect MSK pain, but will check UA, as she wonders if this pain is from kidney  - UA Macroscopic with reflex to Microscopic and Culture  - UA Microscopic with Reflex to Culture    Screening for malignant neoplasm of cervix  - Pap screen with HPV - recommended age 30 - 65 years  - HPV Hold (Lab Only)    Atypical squamous cells cannot exclude high grade squamous intraepithelial lesion on cytologic smear of cervix (ASC-H)  Prev abnl pap, friable cervix, will screen for infection  - NEISSERIA GONORRHOEA PCR  - CHLAMYDIA TRACHOMATIS PCR  - Wet prep - Clinic Collect    Patient scheduled for recheck appt in 3 weeks prior to leaving clinic                 Shabana Erwin MD  [facility] 58 year [facility], presenting for the following health issues:  Gyn Exam (Want to have pelvic check/ possible Pap smear) and Other (Concerns about Yellow Saliva with Mild Wheezing sound)        7/3/2023    10:30 AM   Additional Questions   Roomed by Blanquita MUSA     HPI   Yellow saliva in the morning, \"looks like broth\" when she spits it out. Does not think it is mucous or sputum. No sores, pain, or swelling of mouth, gums, tongue, or teeth. Bitter taste. No n/v. No heartburn. Occasional (rare) RUQ pain that can last 1-2 days. " "Previous GB removal. Right low back pain x 1 year approx 2x/mo at night, feels deep inside, cannot reproduce with pressing on area, helps to pound on area.  Weight stable.     Review of chart shows visits in past few years for same low back pain, and same description of bitter taste/yellow saliva symptom that improved w acid blockers.    Previous rx for omeprazole over one year ago, patient no longer taking.     Requests pap, appears to be overdue. No discharge, no bleeding. Describes a \"sizzling\" sound from pelvis or vagina. Last pap here was 2019 ASCUS cannot exclude HSIL. Does not recall having a pap elsewhere since then. States she has not returned for pap due to pandemic, but thought she needed one every 3-5 years.             Review of Systems         Objective    /62   Pulse 60   Temp 98.1  F (36.7  C) (Oral)   Resp 24   Ht 1.486 m (4' 10.5\")   Wt 54 kg (119 lb)   SpO2 96%   BMI 24.45 kg/m    Body mass index is 24.45 kg/m .  Physical Exam   GENERAL: healthy, alert and no distress  EYES: Eyes grossly normal to inspection, PERRL and conjunctivae and sclerae normal  HENT: mouth without ulcers or lesions  NECK: no adenopathy  RESP: lungs clear to auscultation - no rales, rhonchi or wheezes  CV: regular rate and rhythm, normal S1 S2, no S3 or S4, no murmur, click or rub, no peripheral edema   ABDOMEN: soft, nontender, no hepatosplenomegaly, no masses  Pelvis: Normal external genitalia, normal appearing vaginal mucosa. Cervix friable from 11-1:00, bleeds easily w pap collection  MS: no gross musculoskeletal defects noted, no edema. No midline spine tenderness to palpation                    "

## 2023-07-04 LAB
C TRACH DNA SPEC QL NAA+PROBE: NEGATIVE
N GONORRHOEA DNA SPEC QL NAA+PROBE: NEGATIVE

## 2023-07-05 ENCOUNTER — APPOINTMENT (OUTPATIENT)
Dept: INTERPRETER SERVICES | Facility: CLINIC | Age: 58
End: 2023-07-05
Payer: COMMERCIAL

## 2023-07-05 ENCOUNTER — TELEPHONE (OUTPATIENT)
Dept: FAMILY MEDICINE | Facility: CLINIC | Age: 58
End: 2023-07-05
Payer: COMMERCIAL

## 2023-07-05 RX ORDER — METRONIDAZOLE 500 MG/1
500 TABLET ORAL 2 TIMES DAILY
Qty: 14 TABLET | Refills: 0 | Status: SHIPPED | OUTPATIENT
Start: 2023-07-05 | End: 2023-07-12

## 2023-07-05 NOTE — TELEPHONE ENCOUNTER
"Writer called patient with the help of a \"Hmong\"  regarding provider's message below. No answer, left non-detailed voicemail, with clinic call back number.     If patient returns call back, please relay Dr. Erwin's message below to pt. Thanks.    LAURE OchoaN, RN   [facility]    "

## 2023-07-05 NOTE — LETTER
"July 7, 2023      Stacyia V Moua [address] [place] [address]        Dear ,    We are writing to inform you of your test results.    Test results indicate you may require additional follow up, see comment below.    \"Please inform Betsy of results -   Urine has a small amount of blood - this is probably actually from the vagina since she had some blood after the pap collection. We should repeat the urine test when she returns 7/24. She has a minor vaginal infection called bacterial vaginosis. This is treated with a pill called metronidzaole twice daily x 7 days - I have sent rx to her pharmacy. No alcohol while taking this pill.  Potassium is slightly low - increase potassium rich foods such as banana, sweet potato, spinach, potato, oranges, melon, tomato. Other blood tests are normal. Pap result is not back yet (results take about a week).\"    Resulted Orders   NEISSERIA GONORRHOEA PCR   Result Value Ref Range    Neisseria gonorrhoeae Negative Negative      Comment:      Negative for N. gonorrhoeae rRNA by transcription mediated amplification. A negative result by transcription mediated amplification does not preclude the presence of C. trachomatis infection because results are dependent on proper and adequate collection, absence of inhibitors and sufficient rRNA to be detected.   CHLAMYDIA TRACHOMATIS PCR   Result Value Ref Range    Chlamydia trachomatis Negative Negative      Comment:      A negative result by transcription mediated amplification does not preclude the presence of C. trachomatis infection because results are dependent on proper and adequate collection, absence of inhibitors and sufficient rRNA to be detected.   Wet prep - Clinic Collect   Result Value Ref Range    Trichomonas Absent Absent    Yeast Absent Absent    Clue Cells Present (A) Absent    WBCs/high power field 1+ (A) None   TSH WITH FREE T4 REFLEX   Result Value Ref Range    TSH 3.93 0.30 - 4.20 uIU/mL   Comprehensive metabolic " panel (BMP + Alb, Alk Phos, ALT, AST, Total. Bili, TP)   Result Value Ref Range    Sodium 142 136 - 145 mmol/L    Potassium 3.3 (L) 3.4 - 5.3 mmol/L    Chloride 107 98 - 107 mmol/L    Carbon Dioxide (CO2) 25 22 - 29 mmol/L    Anion Gap 10 7 - 15 mmol/L    Urea Nitrogen 25.1 (H) 8.0 - 23.0 mg/dL    Creatinine 0.71 0.51 - 0.95 mg/dL    Calcium 8.9 8.8 - 10.2 mg/dL    Glucose 90 70 - 99 mg/dL    Alkaline Phosphatase 96 35 - 104 U/L    AST 27 0 - 45 U/L      Comment:      Reference intervals for this test were updated on 6/12/2023 to more accurately reflect our healthy population. There may be differences in the flagging of prior results with similar values performed with this method. Interpretation of those prior results can be made in the context of the updated reference intervals.    ALT 19 0 - 50 U/L      Comment:      Reference intervals for this test were updated on 6/12/2023 to more accurately reflect our healthy population. There may be differences in the flagging of prior results with similar values performed with this method. Interpretation of those prior results can be made in the context of the updated reference intervals.      Protein Total 7.1 6.4 - 8.3 g/dL    Albumin 4.3 3.5 - 5.2 g/dL    Bilirubin Total 0.8 <=1.2 mg/dL    GFR Estimate >90 >60 mL/min/1.73m2   CBC with platelets   Result Value Ref Range    WBC Count 5.7 4.0 - 11.0 10e3/uL    RBC Count 4.85 3.80 - 5.20 10e6/uL    Hemoglobin 14.4 11.7 - 15.7 g/dL    Hematocrit 43.9 35.0 - 47.0 %    MCV 91 78 - 100 fL    MCH 29.7 26.5 - 33.0 pg    MCHC 32.8 31.5 - 36.5 g/dL    RDW 12.0 10.0 - 15.0 %    Platelet Count 158 150 - 450 10e3/uL   UA Macroscopic with reflex to Microscopic and Culture   Result Value Ref Range    Color Urine Yellow Colorless, Straw, Light Yellow, Yellow    Appearance Urine Clear Clear    Glucose Urine Negative Negative mg/dL    Bilirubin Urine Negative Negative    Ketones Urine Negative Negative mg/dL    Specific Gravity Urine 1.025  1.005 - 1.030    Blood Urine Moderate (A) Negative    pH Urine 5.5 5.0 - 7.0    Protein Albumin Urine Negative Negative mg/dL    Urobilinogen Urine 0.2 0.2, 1.0 E.U./dL    Nitrite Urine Negative Negative    Leukocyte Esterase Urine Trace (A) Negative   UA Microscopic with Reflex to Culture   Result Value Ref Range    Bacteria Urine Few (A) None Seen /HPF    RBC Urine 2-5 (A) 0-2 /HPF /HPF    WBC Urine 0-5 0-5 /HPF /HPF    Squamous Epithelials Urine Few (A) None Seen /LPF    Mucus Urine Present (A) None Seen /LPF    Amorphous Crystals Urine Few (A) None Seen /HPF    Narrative    Urine Culture not indicated       If you have any questions or concerns, please call the clinic at the number listed above.       Sincerely,        Dr. Erwin

## 2023-07-05 NOTE — TELEPHONE ENCOUNTER
----- Message from Shabana Erwin MD sent at 7/5/2023 10:24 AM CDT -----  Please inform Plia of results -   Urine has a small amount of blood - this is probably actually from the vagina since she had some blood after the pap collection. We should repeat the urine test when she returns 7/24. She has a minor vaginal infection called bacterial vaginosis. This is treated with a pill called metronidzaole twice daily x 7 days - I have sent rx to her pharmacy. No alcohol while taking this pill.  Potassium is slightly low - increase potassium rich foods such as banana, sweet potato, spinach, potato, oranges, melon, tomato. Other blood tests are normal. Pap result is not back yet (results take about a week)

## 2023-07-06 ENCOUNTER — APPOINTMENT (OUTPATIENT)
Dept: INTERPRETER SERVICES | Facility: CLINIC | Age: 58
End: 2023-07-06
Payer: COMMERCIAL

## 2023-07-06 NOTE — TELEPHONE ENCOUNTER
"Writer attempt #2 to call patient with the help of a \"Hmong\"  regarding provider's message below. No answer, left non-detailed voicemail, with clinic call back number.      If patient returns call back, please relay Dr. Erwin's message below to pt. Thanks.     LAURE OchoaN, RN              [facility]  "

## 2023-07-07 LAB
BKR LAB AP GYN ADEQUACY: NORMAL
BKR LAB AP GYN INTERPRETATION: NORMAL
BKR LAB AP HPV REFLEX: NORMAL
BKR LAB AP PREVIOUS ABNL DX: NORMAL
BKR LAB AP PREVIOUS ABNORMAL: NORMAL
PATH REPORT.COMMENTS IMP SPEC: NORMAL
PATH REPORT.COMMENTS IMP SPEC: NORMAL
PATH REPORT.RELEVANT HX SPEC: NORMAL

## 2023-07-07 NOTE — TELEPHONE ENCOUNTER
"Writer attempt #3 to call patient with the help of a \"Hmong\"  regarding provider's message below. No answer/busy.     If patient returns call back, please relay Dr. Erwin's message below to pt. Thanks.    Third attempt, letter will be sent.    Closing encounter.     LAURE OchoaN, RN              [facility]  "

## 2023-07-10 LAB
HUMAN PAPILLOMA VIRUS 16 DNA: NEGATIVE
HUMAN PAPILLOMA VIRUS 18 DNA: NEGATIVE
HUMAN PAPILLOMA VIRUS FINAL DIAGNOSIS: NORMAL
HUMAN PAPILLOMA VIRUS OTHER HR: NEGATIVE

## 2023-07-12 ENCOUNTER — PATIENT OUTREACH (OUTPATIENT)
Dept: FAMILY MEDICINE | Facility: CLINIC | Age: 58
End: 2023-07-12
Payer: COMMERCIAL

## 2023-07-24 ENCOUNTER — OFFICE VISIT (OUTPATIENT)
Dept: FAMILY MEDICINE | Facility: CLINIC | Age: 58
End: 2023-07-24
Payer: COMMERCIAL

## 2023-07-24 VITALS
WEIGHT: 117 LBS | DIASTOLIC BLOOD PRESSURE: 56 MMHG | HEIGHT: 59 IN | BODY MASS INDEX: 23.59 KG/M2 | OXYGEN SATURATION: 97 % | SYSTOLIC BLOOD PRESSURE: 86 MMHG | TEMPERATURE: 98 F | RESPIRATION RATE: 16 BRPM | HEART RATE: 57 BPM

## 2023-07-24 DIAGNOSIS — E87.6 HYPOKALEMIA: ICD-10-CM

## 2023-07-24 DIAGNOSIS — B96.89 BV (BACTERIAL VAGINOSIS): ICD-10-CM

## 2023-07-24 DIAGNOSIS — R82.90 ABNORMAL FINDING ON URINALYSIS: ICD-10-CM

## 2023-07-24 DIAGNOSIS — K21.9 GASTROESOPHAGEAL REFLUX DISEASE WITHOUT ESOPHAGITIS: ICD-10-CM

## 2023-07-24 DIAGNOSIS — N76.0 BV (BACTERIAL VAGINOSIS): ICD-10-CM

## 2023-07-24 DIAGNOSIS — R19.8 ABDOMINAL/PELVIC SYMPTOM: Primary | ICD-10-CM

## 2023-07-24 DIAGNOSIS — R09.82 POST-NASAL DRAINAGE: ICD-10-CM

## 2023-07-24 LAB
ALBUMIN UR-MCNC: NEGATIVE MG/DL
APPEARANCE UR: CLEAR
BACTERIA #/AREA URNS HPF: ABNORMAL /HPF
BILIRUB UR QL STRIP: NEGATIVE
CAOX CRY #/AREA URNS HPF: ABNORMAL /HPF
COLOR UR AUTO: YELLOW
GLUCOSE UR STRIP-MCNC: NEGATIVE MG/DL
HGB UR QL STRIP: ABNORMAL
KETONES UR STRIP-MCNC: NEGATIVE MG/DL
LEUKOCYTE ESTERASE UR QL STRIP: ABNORMAL
MUCOUS THREADS #/AREA URNS LPF: PRESENT /LPF
NITRATE UR QL: NEGATIVE
PH UR STRIP: 5.5 [PH] (ref 5–7)
POTASSIUM SERPL-SCNC: 3.9 MMOL/L (ref 3.4–5.3)
RBC #/AREA URNS AUTO: ABNORMAL /HPF
SP GR UR STRIP: 1.02 (ref 1–1.03)
SQUAMOUS #/AREA URNS AUTO: ABNORMAL /LPF
UROBILINOGEN UR STRIP-ACNC: 0.2 E.U./DL
WBC #/AREA URNS AUTO: ABNORMAL /HPF

## 2023-07-24 PROCEDURE — 36415 COLL VENOUS BLD VENIPUNCTURE: CPT | Performed by: FAMILY MEDICINE

## 2023-07-24 PROCEDURE — 99214 OFFICE O/P EST MOD 30 MIN: CPT | Performed by: FAMILY MEDICINE

## 2023-07-24 PROCEDURE — 84132 ASSAY OF SERUM POTASSIUM: CPT | Performed by: FAMILY MEDICINE

## 2023-07-24 PROCEDURE — 81001 URINALYSIS AUTO W/SCOPE: CPT | Performed by: FAMILY MEDICINE

## 2023-07-24 RX ORDER — FLUTICASONE PROPIONATE 50 MCG
2 SPRAY, SUSPENSION (ML) NASAL DAILY
Qty: 16 G | Refills: 11 | Status: SHIPPED | OUTPATIENT
Start: 2023-07-24 | End: 2024-07-15

## 2023-07-24 NOTE — PROGRESS NOTES
"  Assessment & Plan     Abnormal finding on urinalysis  Last UA on 7/3 was positive for RBCs, but suspect blood was from vagina, as she had spotting with pap collection just before leaving UA sample. Recheck today. No further eval as long as negative.  - UA Macroscopic with reflex to Microscopic and Culture - Lab Collect  - UA Microscopic with Reflex to Culture    Hypokalemia  Mildly low K at last visit, she has been eating more bananas. Recheck today.   - Potassium    Post-nasal drainage  Recommend trial of flonase for at least 1 month, with plan to continue if it helps.   - fluticasone (FLONASE) 50 MCG/ACT nasal spray  Dispense: 16 g; Refill: 11    Abdominal/pelvic symptom  Difficult history through , but reports a \"sizzling\" feeling \"in uterus\" (unclear if she means vagina). After multiple attempts, cannot get a different description from patient. Ultrasound ordered, patient to schedule if symptoms persist after BV treatment.   - US Pelvic Complete with Transvaginal    BV  Patient was instructed to take metronidazole one pill bid until gone. Written instructions given for her to show daughter    GERD  Symptoms improved w PPI, continue    Patient Instructions   Start flonase nasal spray - two sprays in each nostril once daily. This can be continued year round. I am hoping it will help with the mucous in your throat. If not, let us know and I will refer to a throat specialist.     Metronidazole for bacterial infection - make sure you are taking this medication one pill twice daily for one week.    If you are still having symptoms in your uterus after finishing the metronidazole, schedule the pelvic ultrasound.                        Shabana Erwin MD  [facility] SHANNON Meyer is a 58 year old, presenting for the following health issues:  RECHECK        7/24/2023     8:12 AM   Additional Questions   Roomed by Blanquita MUSA     Here today for recheck after 7/3 visit    Recheck " "GERD: restarted omeprazole at visit on 7/3. Patient reports heartburn is better. Saliva is still clear yellow - first thing in morning when spits. No cough or sputum, feels that this is saliva.     No voice change. Can get hoarse at times when there is mucous in throat, this is not new for her. Reports post nasal drainage.     Recheck low back pain: Back pain is better after doing some stretches at home.     Staff could not reach her w results after last visit but sent letter - daughter who speaks english was able to give her results. For some reason, has been taking metronidazole once weekly instead of bid, so has taken just 3 pills so far.    Feels like uterus is \"sizzling,\" \"like meat on a BBQ,\" also describes to  as a bubbling. Feels like it is deep inside, like where cramping is felt during pap. Symptoms started maybe 1 month ago (uncertain). Symptoms have \"calmed down\" quite a bit. Pap at last visit was NIL HPV neg            Review of Systems         Objective    BP (!) 86/56   Pulse 57   Temp 98  F (36.7  C) (Oral)   Resp 16   Ht 1.486 m (4' 10.5\")   Wt 53.1 kg (117 lb)   SpO2 97%   BMI 24.04 kg/m    Body mass index is 24.04 kg/m .  Physical Exam   Gen: NAD, well appearing                      " anticipated discharge recommendation

## 2023-07-24 NOTE — PATIENT INSTRUCTIONS
Start flonase nasal spray - two sprays in each nostril once daily. This can be continued year round. I am hoping it will help with the mucous in your throat. If not, let us know and I will refer to a throat specialist.     Metronidazole for bacterial infection - make sure you are taking this medication one pill twice daily for one week.    If you are still having symptoms in your uterus after finishing the metronidazole, schedule the pelvic ultrasound.

## 2023-07-25 ENCOUNTER — TELEPHONE (OUTPATIENT)
Dept: FAMILY MEDICINE | Facility: CLINIC | Age: 58
End: 2023-07-25
Payer: COMMERCIAL

## 2023-07-25 ENCOUNTER — APPOINTMENT (OUTPATIENT)
Dept: INTERPRETER SERVICES | Facility: CLINIC | Age: 58
End: 2023-07-25
Payer: COMMERCIAL

## 2023-07-25 NOTE — TELEPHONE ENCOUNTER
Called pt and relayed lab result. Pt verbalized understanding.    Deven Witt Cem Say, BSN RN  [facility]        ----- Message from Shabana Erwin MD sent at 7/25/2023  8:42 AM CDT -----  Please inform of results: Blood test shows that potassium level is back to normal, and urine is clear - no blood in the urine.

## 2023-09-13 ENCOUNTER — OFFICE VISIT (OUTPATIENT)
Dept: FAMILY MEDICINE | Facility: CLINIC | Age: 58
End: 2023-09-13
Payer: COMMERCIAL

## 2023-09-13 ENCOUNTER — TELEPHONE (OUTPATIENT)
Dept: FAMILY MEDICINE | Facility: CLINIC | Age: 58
End: 2023-09-13

## 2023-09-13 VITALS
OXYGEN SATURATION: 99 % | TEMPERATURE: 97.9 F | BODY MASS INDEX: 23.79 KG/M2 | SYSTOLIC BLOOD PRESSURE: 110 MMHG | WEIGHT: 118 LBS | RESPIRATION RATE: 20 BRPM | HEART RATE: 56 BPM | HEIGHT: 59 IN | DIASTOLIC BLOOD PRESSURE: 59 MMHG

## 2023-09-13 DIAGNOSIS — E03.9 HYPOTHYROIDISM, UNSPECIFIED TYPE: ICD-10-CM

## 2023-09-13 DIAGNOSIS — N76.0 BV (BACTERIAL VAGINOSIS): ICD-10-CM

## 2023-09-13 DIAGNOSIS — B96.89 BV (BACTERIAL VAGINOSIS): ICD-10-CM

## 2023-09-13 DIAGNOSIS — R19.8 ABDOMINAL/PELVIC SYMPTOM: Primary | ICD-10-CM

## 2023-09-13 DIAGNOSIS — Z23 NEED FOR VACCINATION: ICD-10-CM

## 2023-09-13 DIAGNOSIS — R82.90 ABNORMAL URINALYSIS: ICD-10-CM

## 2023-09-13 LAB
ALBUMIN UR-MCNC: NEGATIVE MG/DL
APPEARANCE UR: CLEAR
BACTERIA #/AREA URNS HPF: ABNORMAL /HPF
BILIRUB UR QL STRIP: NEGATIVE
COLOR UR AUTO: YELLOW
GLUCOSE UR STRIP-MCNC: NEGATIVE MG/DL
HGB UR QL STRIP: ABNORMAL
KETONES UR STRIP-MCNC: NEGATIVE MG/DL
LEUKOCYTE ESTERASE UR QL STRIP: ABNORMAL
MUCOUS THREADS #/AREA URNS LPF: PRESENT /LPF
NITRATE UR QL: NEGATIVE
PH UR STRIP: 5 [PH] (ref 5–7)
RBC #/AREA URNS AUTO: ABNORMAL /HPF
SP GR UR STRIP: 1.02 (ref 1–1.03)
SQUAMOUS #/AREA URNS AUTO: ABNORMAL /LPF
UROBILINOGEN UR STRIP-ACNC: 0.2 E.U./DL
WBC #/AREA URNS AUTO: ABNORMAL /HPF

## 2023-09-13 PROCEDURE — 90686 IIV4 VACC NO PRSV 0.5 ML IM: CPT | Performed by: FAMILY MEDICINE

## 2023-09-13 PROCEDURE — 99213 OFFICE O/P EST LOW 20 MIN: CPT | Mod: 25 | Performed by: FAMILY MEDICINE

## 2023-09-13 PROCEDURE — 90471 IMMUNIZATION ADMIN: CPT | Performed by: FAMILY MEDICINE

## 2023-09-13 PROCEDURE — 87086 URINE CULTURE/COLONY COUNT: CPT | Performed by: FAMILY MEDICINE

## 2023-09-13 PROCEDURE — 81001 URINALYSIS AUTO W/SCOPE: CPT | Performed by: FAMILY MEDICINE

## 2023-09-13 RX ORDER — LEVOTHYROXINE SODIUM 75 UG/1
75 TABLET ORAL DAILY
Qty: 90 TABLET | Refills: 3 | Status: SHIPPED | OUTPATIENT
Start: 2023-09-13 | End: 2024-08-27

## 2023-09-13 RX ORDER — METRONIDAZOLE 7.5 MG/G
1 GEL VAGINAL DAILY
Qty: 25 G | Refills: 0 | Status: SHIPPED | OUTPATIENT
Start: 2023-09-13 | End: 2023-09-18

## 2023-09-13 ASSESSMENT — PATIENT HEALTH QUESTIONNAIRE - PHQ9
SUM OF ALL RESPONSES TO PHQ QUESTIONS 1-9: 5
10. IF YOU CHECKED OFF ANY PROBLEMS, HOW DIFFICULT HAVE THESE PROBLEMS MADE IT FOR YOU TO DO YOUR WORK, TAKE CARE OF THINGS AT HOME, OR GET ALONG WITH OTHER PEOPLE: NOT DIFFICULT AT ALL
SUM OF ALL RESPONSES TO PHQ QUESTIONS 1-9: 5

## 2023-09-13 NOTE — PROGRESS NOTES
"ASSESMENT AND PLAN:  Diagnoses and all orders for this visit:  Abdominal/pelvic symptom  Patient had difficulty scheduling/navigation for her radiology exam that was previously ordered by Dr Erwin.  Given her ongoing symptoms, I recommended she complete the test.  She is meeting with our specialty scheduling staff to get assistance in getting this set up.  BV (bacterial vaginosis)  Had initial improvement after treatment with oral metronidazole but some recurrence as detailed below, its unclear how much of this is related to BV but we discussed options and she would like to proceed with a treatment with vaginal therapy as prescribed below:-     metroNIDAZOLE (METROGEL) 0.75 % vaginal gel; Place 1 applicator (5 g) vaginally daily for 5 days  Hypothyroidism, unspecified type  Last TSH was normal range.  Refill: -     levothyroxine (SYNTHROID/LEVOTHROID) 75 MCG tablet; Take 1 tablet (75 mcg) by mouth daily  Abnormal urinalysis  -     UA Macroscopic with reflex to Microscopic and Culture - Lab Collect; Future  -     Urine Culture Aerobic Bacterial; Future  -     UA Microscopic with Reflex to Culture  Need for vaccination  -     INFLUENZA VACCINE >6 MONTHS (AFLURIA/FLUZONE)      Reviewed the risks and benefits of the treatment plan with the patient and/or caregiver and we discussed indications for routine and emergent follow-up.        SUBJECTIVE: Patient reports that after she took the prescribed oral metronidazole she had improvement in her atypical vaginal discharge.  However, about a week ago she had some recurrence.  She continues to feel the \"shaking\" or \"boiling\" sensation in her pelvis.  This has not improved, a pelvic ultrasound has been ordered but not completed because the patient had barriers to getting that scheduled and related transportation.  Patient also has questions about her abnormal urinalysis that was done as part of her previous work-up.  She has not been having any pain or burning with urination.  " "No visible blood in the urine.    Past Medical History:   Diagnosis Date    Depression     GERD (gastroesophageal reflux disease)     Hypothyroidism     Nonsenile cataract      Patient Active Problem List   Diagnosis    Hypothyroidism    Colon polyp    Chronic gastritis    Rotator cuff tear, right    Vitamin D deficiency    Chronic bilateral low back pain without sciatica    Anxiety    Constipation, unspecified constipation type    History of cholecystectomy    Grief reaction    Pain in both lower legs    Moderate major depression (H)    Atypical squamous cells cannot exclude high grade squamous intraepithelial lesion on cytologic smear of cervix (ASC-H)     Current Outpatient Medications   Medication Sig Dispense Refill    fluticasone (FLONASE) 50 MCG/ACT nasal spray Spray 2 sprays into both nostrils daily 16 g 11    levothyroxine (SYNTHROID/LEVOTHROID) 75 MCG tablet Take 1 tablet (75 mcg) by mouth daily 90 tablet 3    metroNIDAZOLE (METROGEL) 0.75 % vaginal gel Place 1 applicator (5 g) vaginally daily for 5 days 25 g 0    omeprazole (PRILOSEC) 20 MG DR capsule [OMEPRAZOLE (PRILOSEC) 20 MG CAPSULE] Take one every morning 30 mins before eating 90 capsule 3    ibuprofen (ADVIL/MOTRIN) 600 MG tablet Take one tablet three times daily as needed for pain- take with food. 90 tablet 3    vitamin D3 (CHOLECALCIFEROL) 125 MCG (5000 UT) tablet Take 1 tablet (125 mcg) by mouth once a week (Patient not taking: Reported on 7/24/2023) 100 tablet 3     History   Smoking Status    Never   Smokeless Tobacco    Never       OBJECTICE: /59   Pulse 56   Temp 97.9  F (36.6  C) (Oral)   Resp 20   Ht 1.486 m (4' 10.5\")   Wt 53.5 kg (118 lb)   SpO2 99%   BMI 24.24 kg/m       Recent Results (from the past 24 hour(s))   UA Macroscopic with reflex to Microscopic and Culture - Lab Collect    Collection Time: 09/13/23 10:31 AM    Specimen: Urine, NOS   Result Value Ref Range    Color Urine Yellow Colorless, Straw, Light Yellow, " Yellow    Appearance Urine Clear Clear    Glucose Urine Negative Negative mg/dL    Bilirubin Urine Negative Negative    Ketones Urine Negative Negative mg/dL    Specific Gravity Urine 1.020 1.005 - 1.030    Blood Urine Trace (A) Negative    pH Urine 5.0 5.0 - 7.0    Protein Albumin Urine Negative Negative mg/dL    Urobilinogen Urine 0.2 0.2, 1.0 E.U./dL    Nitrite Urine Negative Negative    Leukocyte Esterase Urine Small (A) Negative   UA Microscopic with Reflex to Culture    Collection Time: 09/13/23 10:31 AM   Result Value Ref Range    Bacteria Urine Few (A) None Seen /HPF    RBC Urine 0-2 0-2 /HPF /HPF    WBC Urine 0-5 0-5 /HPF /HPF    Squamous Epithelials Urine Few (A) None Seen /LPF    Mucus Urine Present (A) None Seen /LPF        GEN-alert, appropriate, in no apparent distress   CV-regular rate and rhythm with no murmur   RESP-lungs clear to auscultation   ABDOMINAL-soft, nontender to palpation, no palpable masses organomegaly, no CVA tenderness to percussion.       Kranthi Smith MD

## 2023-09-13 NOTE — TELEPHONE ENCOUNTER
General Call    Contacts         Type Contact Phone/Fax    09/13/2023 02:11 PM CDT Phone (Outgoing) Betsy Negron (Self) [phone] (H)          Reason for Call:  Transportation request    What are your questions or concerns:  Pt requested Ride to her appt on 09/15/2023 to N for U/U    Date of last appointment with provider: 09/13/2023    Okay to leave a detailed message?: Left VM, If Pt calls back, please relay that transportation for appt has been set up through [employer].

## 2023-09-15 ENCOUNTER — HOSPITAL ENCOUNTER (OUTPATIENT)
Dept: ULTRASOUND IMAGING | Facility: HOSPITAL | Age: 58
Discharge: HOME | End: 2023-09-15
Attending: FAMILY MEDICINE | Admitting: FAMILY MEDICINE
Payer: COMMERCIAL

## 2023-09-15 DIAGNOSIS — R19.8 ABDOMINAL/PELVIC SYMPTOM: ICD-10-CM

## 2023-09-15 LAB — BACTERIA UR CULT: NO GROWTH

## 2023-09-15 PROCEDURE — 76830 TRANSVAGINAL US NON-OB: CPT

## 2023-09-18 ENCOUNTER — TELEPHONE (OUTPATIENT)
Dept: FAMILY MEDICINE | Facility: CLINIC | Age: 58
End: 2023-09-18
Payer: COMMERCIAL

## 2023-09-18 DIAGNOSIS — N88.8 FRIABLE CERVIX: ICD-10-CM

## 2023-09-18 DIAGNOSIS — R19.8 ABDOMINAL/PELVIC SYMPTOM: ICD-10-CM

## 2023-09-18 DIAGNOSIS — R93.89 ENDOMETRIAL THICKENING ON ULTRASOUND: Primary | ICD-10-CM

## 2023-09-18 NOTE — TELEPHONE ENCOUNTER
----- Message from Shabana Erwin MD sent at 9/18/2023  9:28 AM CDT -----  Please inform patient that uterus and ovaries look overall ok on ultrasound, but there is one area of the uterus where there is some mild thickening. I would like her to see a gynecologist (uterus specialist) to further evaluate this and her vaginal/uterus symptoms. Referral placed to [facility], please help w scheduling and transportation (patient has had trouble setting up appointments in past)

## 2023-09-18 NOTE — TELEPHONE ENCOUNTER
Called pt with help from  jose  and relayed message to pt. She verbalized understanding.      Specialty scheduling: please help pt schedule appt with [facility] as indicated by provider. Thanks      Deven Andrews BSN RN  [facility]

## 2023-09-19 ENCOUNTER — APPOINTMENT (OUTPATIENT)
Dept: INTERPRETER SERVICES | Facility: CLINIC | Age: 58
End: 2023-09-19
Payer: COMMERCIAL

## 2023-09-19 ENCOUNTER — NURSE TRIAGE (OUTPATIENT)
Dept: FAMILY MEDICINE | Facility: CLINIC | Age: 58
End: 2023-09-19
Payer: COMMERCIAL

## 2023-09-19 NOTE — TELEPHONE ENCOUNTER
Nurse Triage SBAR    Is this a 2nd Level Triage? NO    Situation: chills and body ache    Background: symptoms started yesterday.    Assessment:   - chills and body ache (pain at 9/10)  - no fever  - no chest pain  - no SOB  - no vomiting  - some runny nose.   - no cough      Protocol Recommended Disposition:   See in Office Within 3 Days    Recommendation: RN discussed with provider. Due to no openings today, pt can go to walk-in clinic. Otherwise we can try to schedule her with soonest appt. Pt stated she will try OTC medications first. Advised pt to all back if symptoms worsen or she develops new symptoms. Pt verbalized understanding.       Does the patient meet one of the following criteria for ADS visit consideration? 16+ years old, with an [facility] PCP     TIP  Providers, please consider if this condition is appropriate for management at one of our Acute and Diagnostic Services sites.     If patient is a good candidate, please use dotphrase <dot>triageresponse and select Refer to ADS to document.    Deven Andrews, BSN BR[place]ON  [facility]      Reason for Disposition   Patient wants to be seen    Additional Information   Negative: Shock suspected (e.g., cold/pale/clammy skin, too weak to stand, low BP, rapid pulse)   Negative: Difficult to awaken or acting confused (e.g., disoriented, slurred speech)   Negative: Sounds like a life-threatening emergency to the triager   Negative: Chest pain   Negative: Arm pains with exertion (e.g., walking)   Negative: Muscle aches from influenza (flu) suspected   Negative: Muscle aches from heat exposure suspected   Negative: Lyme disease suspected (e.g., bull's eye rash or tick bite / exposure in past month)   Negative: Pain only in back   Negative: Pain in one arm OR arm pains caused by recent vigorous activity (e.g., sports, lifting, overuse)   Negative: Pain in one leg OR leg pains caused by recent vigorous activity (e.g., sports, lifting, overuse)    "Negative: Rash over large area or most of the body (widespread or generalized)   Negative: Dark (cola or tea-colored) or red-colored urine   Negative: Drinking very little and dehydration suspected (e.g., no urine > 12 hours, very dry mouth, very lightheaded)   Negative: Patient sounds very sick or weak to the triager   Negative: SEVERE pain (e.g., excruciating, unable to do any normal activities) and not improved 2 hours after pain medicine   Negative: SEVERE pain and taking a statin medicine (a lipid or cholesterol lowering drug)   Negative: Fever > 104 F (40 C)   Negative: Fever > 101 F (38.3 C) and over 60 years of age   Negative: Fever > 100.0 F  (37.8 C) and bedridden (e.g., CVA, chronic illness, recovering from surgery)   Negative: Fever > 100.0 F (37.8 C) and indwelling urinary catheter (e.g., Stinson, coude)   Negative: Fever > 100.0 F (37.8 C) and diabetes mellitus or weak immune system (e.g., HIV positive, cancer chemo, splenectomy, organ transplant, chronic steroids)   Negative: Fever present > 3 days (72 hours)   Negative: Muscle aches are unexplained and occur within 1 month of a tick bite   Negative: Diabetes mellitus or weak immune system (e.g., HIV positive, cancer chemo, splenectomy, organ transplant, chronic steroids)   Negative: MODERATE pain (e.g., interferes with normal activities) and present > 3 days   Negative: MILD or MODERATE muscle aches or pain and taking a statin medicine (a lipid or cholesterol lowering drug)   Negative: Age > 50 and bilateral shoulder pains present > 2 weeks    Answer Assessment - Initial Assessment Questions  1. ONSET: \"When did the muscle aches or body pains start?\"       Started yesterday    2. LOCATION: \"What part of your body is hurting?\" (e.g., entire body, arms, legs)       Entire body, legs and bones    3. SEVERITY: \"How bad is the pain?\" (Scale 1-10; or mild, moderate, severe)    - MILD (1-3): doesn't interfere with normal activities     - MODERATE (4-7): " "interferes with normal activities or awakens from sleep     - SEVERE (8-10):  excruciating pain, unable to do any normal activities       9 out of 10    4. CAUSE: \"What do you think is causing the pains?\"      No, pt stated after having unit(s)/s sound she was told that she had some infection and is wondering of it could be due to it.    5. FEVER: \"Have you been having fever?\"      No fever 98    6. OTHER SYMPTOMS: \"Do you have any other symptoms?\" (e.g., chest pain, weakness, rash, cold or flu symptoms, weight loss)      No chest pain, or other symptoms. Pt stated she has a runny nose and some sore throat yesterday.    7. PREGNANCY: \"Is there any chance you are pregnant?\" \"When was your last menstrual period?\"      No    8. TRAVEL: \"Have you traveled out of the country in the last month?\" (e.g., travel history, exposures)      No    Protocols used: Muscle Aches and Body Pain-A-[place]    "

## 2023-09-21 ENCOUNTER — LAB REQUISITION (OUTPATIENT)
Dept: LAB | Facility: CLINIC | Age: 58
End: 2023-09-21
Payer: COMMERCIAL

## 2023-09-21 DIAGNOSIS — N76.0 ACUTE VAGINITIS: ICD-10-CM

## 2023-09-21 PROCEDURE — 87102 FUNGUS ISOLATION CULTURE: CPT | Mod: ORL | Performed by: STUDENT IN AN ORGANIZED HEALTH CARE EDUCATION/TRAINING PROGRAM

## 2023-09-25 LAB — BACTERIA SPEC CULT: NO GROWTH

## 2024-04-01 ENCOUNTER — VIRTUAL VISIT (OUTPATIENT)
Dept: INTERPRETER SERVICES | Facility: CLINIC | Age: 59
End: 2024-04-01

## 2024-04-01 ENCOUNTER — OFFICE VISIT (OUTPATIENT)
Dept: FAMILY MEDICINE | Facility: CLINIC | Age: 59
End: 2024-04-01
Payer: COMMERCIAL

## 2024-04-01 VITALS
BODY MASS INDEX: 26.89 KG/M2 | HEIGHT: 58 IN | HEART RATE: 44 BPM | OXYGEN SATURATION: 97 % | DIASTOLIC BLOOD PRESSURE: 50 MMHG | SYSTOLIC BLOOD PRESSURE: 117 MMHG | RESPIRATION RATE: 20 BRPM | TEMPERATURE: 97.8 F | WEIGHT: 128.08 LBS

## 2024-04-01 DIAGNOSIS — R19.8 FULLNESS OF ABDOMEN: Primary | ICD-10-CM

## 2024-04-01 DIAGNOSIS — Z13.1 SCREENING FOR DIABETES MELLITUS: ICD-10-CM

## 2024-04-01 DIAGNOSIS — Z13.220 LIPID SCREENING: ICD-10-CM

## 2024-04-01 DIAGNOSIS — K21.9 GASTROESOPHAGEAL REFLUX DISEASE, UNSPECIFIED WHETHER ESOPHAGITIS PRESENT: ICD-10-CM

## 2024-04-01 DIAGNOSIS — Z86.0100 HISTORY OF COLONIC POLYPS: ICD-10-CM

## 2024-04-01 LAB
ALBUMIN SERPL BCG-MCNC: 4.2 G/DL (ref 3.5–5.2)
ALP SERPL-CCNC: 89 U/L (ref 40–150)
ALT SERPL W P-5'-P-CCNC: 16 U/L (ref 0–50)
ANION GAP SERPL CALCULATED.3IONS-SCNC: 8 MMOL/L (ref 7–15)
AST SERPL W P-5'-P-CCNC: 19 U/L (ref 0–45)
BILIRUB SERPL-MCNC: 0.4 MG/DL
BUN SERPL-MCNC: 14.1 MG/DL (ref 8–23)
CALCIUM SERPL-MCNC: 8.9 MG/DL (ref 8.8–10.2)
CHLORIDE SERPL-SCNC: 109 MMOL/L (ref 98–107)
CHOLEST SERPL-MCNC: 176 MG/DL
CREAT SERPL-MCNC: 0.64 MG/DL (ref 0.51–0.95)
DEPRECATED HCO3 PLAS-SCNC: 26 MMOL/L (ref 22–29)
EGFRCR SERPLBLD CKD-EPI 2021: >90 ML/MIN/1.73M2
FASTING STATUS PATIENT QL REPORTED: YES
GLUCOSE SERPL-MCNC: 91 MG/DL (ref 70–99)
HBA1C MFR BLD: 5.4 % (ref 0–5.6)
HDLC SERPL-MCNC: 50 MG/DL
LDLC SERPL CALC-MCNC: 98 MG/DL
LIPASE SERPL-CCNC: 33 U/L (ref 13–60)
NONHDLC SERPL-MCNC: 126 MG/DL
POTASSIUM SERPL-SCNC: 3.3 MMOL/L (ref 3.4–5.3)
PROT SERPL-MCNC: 7 G/DL (ref 6.4–8.3)
SODIUM SERPL-SCNC: 143 MMOL/L (ref 135–145)
TRIGL SERPL-MCNC: 142 MG/DL

## 2024-04-01 PROCEDURE — 83690 ASSAY OF LIPASE: CPT | Performed by: FAMILY MEDICINE

## 2024-04-01 PROCEDURE — 99214 OFFICE O/P EST MOD 30 MIN: CPT | Performed by: FAMILY MEDICINE

## 2024-04-01 PROCEDURE — 36415 COLL VENOUS BLD VENIPUNCTURE: CPT | Performed by: FAMILY MEDICINE

## 2024-04-01 PROCEDURE — 83036 HEMOGLOBIN GLYCOSYLATED A1C: CPT | Performed by: FAMILY MEDICINE

## 2024-04-01 PROCEDURE — T1013 SIGN LANG/ORAL INTERPRETER: HCPCS | Mod: U4 | Performed by: INTERPRETER

## 2024-04-01 PROCEDURE — 80061 LIPID PANEL: CPT | Performed by: FAMILY MEDICINE

## 2024-04-01 PROCEDURE — 80053 COMPREHEN METABOLIC PANEL: CPT | Performed by: FAMILY MEDICINE

## 2024-04-01 ASSESSMENT — PATIENT HEALTH QUESTIONNAIRE - PHQ9
SUM OF ALL RESPONSES TO PHQ QUESTIONS 1-9: 0
10. IF YOU CHECKED OFF ANY PROBLEMS, HOW DIFFICULT HAVE THESE PROBLEMS MADE IT FOR YOU TO DO YOUR WORK, TAKE CARE OF THINGS AT HOME, OR GET ALONG WITH OTHER PEOPLE: NOT DIFFICULT AT ALL
SUM OF ALL RESPONSES TO PHQ QUESTIONS 1-9: 0

## 2024-04-01 NOTE — PROGRESS NOTES
"  Assessment & Plan     History of colonic polyps  Informed patient that she appears overdue for follow up colonoscopy from 2019 due to polyps (advised repeat in 3 years) - order placed  - Colonoscopy Screening  Referral    Gastroesophageal reflux disease, unspecified whether esophagitis present  Based on upper GI symptoms - prolonged abd fullness and reflux after eating -, recommend EGD at time of colonoscopy.   - Adult GI  Referral - Procedure Only    Subjective post prandial fullness of upper abdomen  Symptoms for about one month. No dysphagia or odynophagia. Symptoms are in upper abdomen. Unremarkable exam today. No weight loss. Hepatic labs and lipase today, referral for EGD and colonoscopy, and restart omeprazole (patient to check with pharmacy, should be able to get refill now)  - Adult GI  Referral - Procedure Only  - Comprehensive metabolic panel (BMP + Alb, Alk Phos, ALT, AST, Total. Bili, TP)  - Lipase    Lipid screening  - Lipid panel    Screening for diabetes mellitus  - Hemoglobin A1c              BMI  Estimated body mass index is 26.77 kg/m  as calculated from the following:    Height as of this encounter: 1.473 m (4' 10\").    Weight as of this encounter: 58.1 kg (128 lb 1.3 oz).             Subjective   Betsy is a 58 year old, presenting for the following health issues:  Gastric Problem (Hard to digest food) and Blood Draw (Pt want A1C and lipid level check)      4/1/2024     8:21 AM   Additional Questions   Roomed by leidy fischer MA   Accompanied by self     GI symptoms:  When she eats, feels like food stays in stomach for a long time - feels full for a long time. No trouble swallowing or food getting stuck. Also having reflux. No regurgitation of food. No weight loss. Symptoms for about a month    Ran out of medication for stomach and pharmacy said she could not get refills - patient uncertain which medication, but her list includes omeprazole, last prescribed in July 90 days + " "3 RF - possibly tried to fill too early based on further questioning and review of pharmacy fill dates. When taking this medication, it helps and she does not get the prolonged fullness feeling and reflux  that she currently has.    Last EGD and colonoscopy 2019. Colonoscopy - polyps - repeat 3 years advised, [facility]                     Objective    /50   Pulse (!) 44   Temp 97.8  F (36.6  C) (Oral)   Resp 20   Ht 1.473 m (4' 10\")   Wt 58.1 kg (128 lb 1.3 oz)   SpO2 97%   BMI 26.77 kg/m    Body mass index is 26.77 kg/m .  Physical Exam   GENERAL: alert and no distress  EYES: Eyes grossly normal to inspection, conjunctivae and sclerae normal, no icterus  HENT: oral mucous membranes moist  NECK: no adenopathy  RESP: lungs clear to auscultation - no rales, rhonchi or wheezes  CV: regular rate and rhythm, normal S1 S2, no S3 or S4, no murmur, click or rub, no peripheral edema  ABDOMEN: soft, not distended, nontender, but points to epigastrium and a band across her upper abdomen to indicate where she feels prolonged fullness after eating   MS: no gross musculoskeletal defects noted, no edema            Signed Electronically by: Shabana Erwin MD    "

## 2024-04-11 ENCOUNTER — APPOINTMENT (OUTPATIENT)
Dept: INTERPRETER SERVICES | Facility: CLINIC | Age: 59
End: 2024-04-11
Payer: COMMERCIAL

## 2024-04-12 ENCOUNTER — TELEPHONE (OUTPATIENT)
Dept: FAMILY MEDICINE | Facility: CLINIC | Age: 59
End: 2024-04-12
Payer: COMMERCIAL

## 2024-04-12 NOTE — TELEPHONE ENCOUNTER
----- Message from Shabana Erwin MD sent at 4/12/2024 11:47 AM CDT -----  Please inform patient that blood test results from her visit last week are all ok, except for slightly low potassium. She should increase potassium rich foods in diet such as banana, dried fruit, avocado, orange, tomato, cucumber, carrot, potato. I see she is not yet scheduled for colonoscopy and upper endoscopy - does she need help scheduling?

## 2024-04-12 NOTE — LETTER
April 16, 2024      Betsy Negron  [address] [place] [address]        Dear ,    We are writing to inform you of your test results. We have made several attempts to contact you by phone.    Your blood test results from your 4/1/24 office visit are all ok, except for slightly low potassium. You should increase potassium rich foods in diet such as banana, dried fruit, avocado, orange, tomato, cucumber, carrot, potato.     I see you are not yet scheduled for colonoscopy and upper endoscopy. Please contact the clinic at the number listed above if you would like us to help schedule these.    Resulted Orders   Lipid panel   Result Value Ref Range    Cholesterol 176 <200 mg/dL    Triglycerides 142 <150 mg/dL    Direct Measure HDL 50 >=50 mg/dL    LDL Cholesterol Calculated 98 <=100 mg/dL    Non HDL Cholesterol 126 <130 mg/dL    Patient Fasting > 8hrs? Yes     Narrative    Cholesterol  Desirable:  <200 mg/dL    Triglycerides  Normal:  Less than 150 mg/dL  Borderline High:  150-199 mg/dL  High:  200-499 mg/dL  Very High:  Greater than or equal to 500 mg/dL    Direct Measure HDL  Female:  Greater than or equal to 50 mg/dL   Male:  Greater than or equal to 40 mg/dL    LDL Cholesterol  Desirable:  <100mg/dL  Above Desirable:  100-129 mg/dL   Borderline High:  130-159 mg/dL   High:  160-189 mg/dL   Very High:  >= 190 mg/dL    Non HDL Cholesterol  Desirable:  130 mg/dL  Above Desirable:  130-159 mg/dL  Borderline High:  160-189 mg/dL  High:  190-219 mg/dL  Very High:  Greater than or equal to 220 mg/dL   Hemoglobin A1c   Result Value Ref Range    Hemoglobin A1C 5.4 0.0 - 5.6 %      Comment:      Normal <5.7%   Prediabetes 5.7-6.4%    Diabetes 6.5% or higher     Note: Adopted from ADA consensus guidelines.   Comprehensive metabolic panel (BMP + Alb, Alk Phos, ALT, AST, Total. Bili, TP)   Result Value Ref Range    Sodium 143 135 - 145 mmol/L      Comment:      Reference intervals for this test were updated on 09/26/2023  to more accurately reflect our healthy population. There may be differences in the flagging of prior results with similar values performed with this method. Interpretation of those prior results can be made in the context of the updated reference intervals.     Potassium 3.3 (L) 3.4 - 5.3 mmol/L    Carbon Dioxide (CO2) 26 22 - 29 mmol/L    Anion Gap 8 7 - 15 mmol/L    Urea Nitrogen 14.1 8.0 - 23.0 mg/dL    Creatinine 0.64 0.51 - 0.95 mg/dL    GFR Estimate >90 >60 mL/min/1.73m2    Calcium 8.9 8.8 - 10.2 mg/dL    Chloride 109 (H) 98 - 107 mmol/L    Glucose 91 70 - 99 mg/dL    Alkaline Phosphatase 89 40 - 150 U/L      Comment:      Reference intervals for this test were updated on 11/14/2023 to more accurately reflect our healthy population. There may be differences in the flagging of prior results with similar values performed with this method. Interpretation of those prior results can be made in the context of the updated reference intervals.    AST 19 0 - 45 U/L      Comment:      Reference intervals for this test were updated on 6/12/2023 to more accurately reflect our healthy population. There may be differences in the flagging of prior results with similar values performed with this method. Interpretation of those prior results can be made in the context of the updated reference intervals.    ALT 16 0 - 50 U/L      Comment:      Reference intervals for this test were updated on 6/12/2023 to more accurately reflect our healthy population. There may be differences in the flagging of prior results with similar values performed with this method. Interpretation of those prior results can be made in the context of the updated reference intervals.      Protein Total 7.0 6.4 - 8.3 g/dL    Albumin 4.2 3.5 - 5.2 g/dL    Bilirubin Total 0.4 <=1.2 mg/dL   Lipase   Result Value Ref Range    Lipase 33 13 - 60 U/L       If you have any questions or concerns, please call the clinic at the number listed above.        Sincerely,      Primary Care RN

## 2024-04-12 NOTE — TELEPHONE ENCOUNTER
Called patient, unable to reach patient, left voicemail. Please relay provider message if patient calls back. Thank you.       Kandice Muller, MSN, RN   [facility]

## 2024-04-15 ENCOUNTER — APPOINTMENT (OUTPATIENT)
Dept: INTERPRETER SERVICES | Facility: CLINIC | Age: 59
End: 2024-04-15
Payer: COMMERCIAL

## 2024-04-15 NOTE — TELEPHONE ENCOUNTER
2nd attempt to call pt. Left message to call clinic back. If pt returns call, please relay message.        Deven Andrews, BSN RN  [facility]

## 2024-04-16 ENCOUNTER — APPOINTMENT (OUTPATIENT)
Dept: INTERPRETER SERVICES | Facility: CLINIC | Age: 59
End: 2024-04-16
Payer: COMMERCIAL

## 2024-04-16 NOTE — TELEPHONE ENCOUNTER
3rd attempt: LVM requesting patient return call with help of [facility]. Please relay provider message upon return call.    Mailing result letter to patient address and closing encounter per protocol.    Krystle Torre RN  [facility]

## 2024-05-02 ENCOUNTER — APPOINTMENT (OUTPATIENT)
Dept: INTERPRETER SERVICES | Facility: CLINIC | Age: 59
End: 2024-05-02
Payer: COMMERCIAL

## 2024-05-02 ENCOUNTER — TELEPHONE (OUTPATIENT)
Dept: GASTROENTEROLOGY | Facility: CLINIC | Age: 59
End: 2024-05-02
Payer: COMMERCIAL

## 2024-05-02 DIAGNOSIS — Z86.0100 HISTORY OF COLONIC POLYPS: Primary | ICD-10-CM

## 2024-05-02 NOTE — TELEPHONE ENCOUNTER
"Endoscopy Scheduling Screen    Have you had a positive Covid test in the last 14 days?  No    What is your communication preference for Instructions and/or Bowel Prep?   Mail/USPS    What insurance is in the chart?  Other:  [facility]    Ordering/Referring Provider:     TAMELA VEGA      (If ordering provider performs procedure, schedule with ordering provider unless otherwise instructed. )    BMI: Estimated body mass index is 26.77 kg/m  as calculated from the following:    Height as of 4/1/24: 1.473 m (4' 10\").    Weight as of 4/1/24: 58.1 kg (128 lb 1.3 oz).     Sedation Ordered  moderate sedation.   If patient BMI > 50 do not schedule in [facility].    If patient BMI > 45 do not schedule at [facility].    Are you taking methadone or Suboxone?  No    Have you had difficulties, pain, or discomfort during past endoscopy procedures?  No    Are you taking any prescription medications for pain 3 or more times per week?   NO, No RN review required.    Do you have a history of malignant hyperthermia?  No    (Females) Are you currently pregnant?        Have you been diagnosed or told you have pulmonary hypertension?   No    Do you have an LVAD?  No    Have you been told you have moderate to severe sleep apnea?  No    Have you been told you have COPD, asthma, or any other lung disease?  Yes     What breathing problems do you have?  Asthma     Do you use home oxygen?  No    Have your breathing problems required an ED visit or hospitalization in the last year?  No    Do you have any heart conditions?  No     Have you ever had or are you waiting for an organ transplant?  No. Continue scheduling, no site restrictions.    Have you had a stroke or transient ischemic attack (TIA aka \"mini stroke\" in the last 6 months?   No    Have you been diagnosed with or been told you have cirrhosis of the liver?   No    Are you currently on dialysis?   No    Do you need assistance transferring?   No    BMI: Estimated body mass index is 26.77 kg/m  as " "calculated from the following:    Height as of 4/1/24: 1.473 m (4' 10\").    Weight as of 4/1/24: 58.1 kg (128 lb 1.3 oz).     Is patients BMI > 40 and scheduling location UP?  No    Do you take an injectable medication for weight loss or diabetes (excluding insulin)?  No    Do you take the medication Naltrexone?  No    Do you take blood thinners?  No       Prep   Are you currently on dialysis or do you have chronic kidney disease?  No    Do you have a diagnosis of diabetes?  No    Do you have a diagnosis of cystic fibrosis (CF)?  No    On a regular basis do you go 3 -5 days between bowel movements?  No    BMI > 40?  No    Preferred Pharmacy:    [id number] - [place], [place] - [address] [place] [address]  Phone: [phone] Fax: [phone]      Final Scheduling Details     Procedure scheduled  Colonoscopy / Upper endoscopy (EGD)    Surgeon:  BINDU     Date of procedure:  7/8     Pre-OP / PAC:   No - Not required for this site.    Location  [facility] - Patient preference.    Sedation   Moderate Sedation - Per order.      Patient Reminders:   You will receive a call from a Nurse to review instructions and health history.  This assessment must be completed prior to your procedure.  Failure to complete the Nurse assessment may result in the procedure being cancelled.      On the day of your procedure, please designate an adult(s) who can drive you home stay with you for the next 24 hours. The medicines used in the exam will make you sleepy. You will not be able to drive.      You cannot take public transportation, ride share services, or non-medical taxi service without a responsible caregiver.  Medical transport services are allowed with the requirement that a responsible caregiver will receive you at your destination.  We require that drivers and caregivers are confirmed prior to your procedure.  "

## 2024-05-02 NOTE — LETTER
Radha 10, 2024      Betsy Negron  [address] [place] [address]              Dear Madhav Meyer Extended Colonoscopy Prep  Prep instructions for colonoscopy   Pre-Assessment Phone Number: [facility]; [phone] option 4  Bowel prep has been sent to [facility] #[id number] - [place], [place] - [address] AT [address]        Please read these instructions carefully at least 7 days prior to your colonoscopy procedure. Be sure to follow all directions completely. The inside of your colon must be clean to allow for a complete examination for the presence of any growths, polyps, and/or abnormalities, as well as their biopsy or removal. A number of tips are included in order to make this part of the procedure as comfortable as possible.    Immediately:  A nurse will call you to go over instructions and your health history.  It's important to complete the nurse assessment before your procedure. If you don't, your procedure might have to be canceled.  You must arrange for an adult to drive you home after your exam. Your colonoscopy cannot be done unless you have a ride. If you need to use public transportation, someone must ride with you and stay with you for a minimum of 6-24 hours.  Check with your insurance company to be sure they will cover this exam.Arrange for a responsible adult to drive you home on the day of the exam. This cannot be a taxi or a bus as you will need someone with you after the procedure.    7 days prior:  Talk to your prescribing provider: If you take blood thinners (such as Coumadin, Plavix, Xarelto, Eliquis, Lovenox, or others), these medications may need to be stopped temporarily before your procedure. Your prescribing provider will tell you what to do.   Talk to your prescribing provider: If you take prescription NSAIDS (such as Sulindac, Celebrex, Mobic, Relafen). Your prescribing provider will tell you what to do.   If you have diabetes, you  should request an early morning appointment.  Stop taking fiber supplements and multivitamins containing iron, or any other medications containing iron.  Fill your prescription for 2 containers of Golytely and 4 Dulcolax tablets at the pharmacy.  It is very important that you stay well hydrated during the colonoscopy prep. The Golytely bowel prep is designed to clean out your colon, but it will not provide hydration. While you are taking the prescribed prep, you should also drink 64 oz. of Gatorade or similar sports drink product to drink for staying hydrated. (Avoid red and purple colors)  Stop eating corn, popcorn, nuts and foods with seeds.   Begin a restricted or low fiber diet (see list below).    2 days prior:  Drink fluids to be well hydrated, this is important. Drink at least 4 large glasses of water, Gatorade, or other similar sports drinks.  It is a good idea to use Vaseline on the skin around your anus after each bowel movement to prevent irritation. Wet wipes also help to reduce irritation.   You can have a light, low-fiber breakfast. You may also have a light, low-fiber lunch.  No solid foods after 1pm. Begin a clear liquid diet. (see list below).  At 4pm, take 2 Dulcolax (bisacodyl) tablets.  At 5pm, mix and drink half of a jug of Golytely bowel prep. Drink an 8 oz. Glass of Golytely every 10-15 minutes until half of the jug is gone. Place the remainder of the Golytely in the refrigerator. Stay close to the bathroom.     1 day prior:  Continue clear liquid diet only (see examples below). Do not eat solid food this day.  Do not drink any red or purple colored drinks.  Stop taking NSAID pain relievers, such as Advil, Ibuprofen, Motrin, etc.  You may take Tylenol.  At 4 pm, take 2 Dulcolax (bisacodyl) tablets.  At 5 pm, drink the 2nd half of a jug of Golytely bowel prep. Drink an 8 oz. glass of Golytely every 10-15 minutes until the 1st jug of Golytely is gone.   The Golytely bowel prep will not keep you  hydrated. You should drink 8-10 glasses of clear liquids throughout the day.  Before you go to bed, mix the 2nd container of Golytely and place in the refrigerator for the morning.      Procedure day:  6 hours before your check-in time, drink an 8 oz. Glass of Golytely every 10-15 minutes until half of the 2nd jug of Golytely is gone. You WILL NOT drink the entire 2nd jug of Golytely.   You may take your necessary morning medications with sips of water  Do not take diabetes medicine by mouth until after your exam.  You may drink clear liquids only up until 2 hours before your arrival time.  Do not smoke or swallow anything, including water or gum for at least 2 hours before your arrival time. This is a safety issue. Your procedure could be cancelled if you do not follow directions.  No chewing tobacco 6 hours prior to procedure arrival time.   Please do not wear jewelry (i.e. earrings, rings, necklaces, watches, etc) . Leave your purse, billfold, credit cards, and other valuables at home.   Please arrive with a responsible adult who can take you home after the test and stay with you for a minimum of 24 hours: The medicine used will make you sleepy and forgetful. If you do not have someone to take you home, we will cancel your procedure. If using public transportation you must have someone to ride with you.  Please perform your nebulizer treatments and airway clearance therapy in the morning prior to the procedure (if applicable).  If you have asthma, bring your inhalers.    CLEAR LIQUID DIET   You may have:  Water, tea, coffee (no milk or cream)  Soda pop, Gatorade (not red or purple)  Jell-O, Popsicles (no milk or fruit pieces - not red or purple)  Fat-free soup broth or bouillon  Plain hard candy, such as clear life savers (not red or purple)  Clear juices and fruit-flavored drinks, such as apple juice, white grape juice, Hi-C, and Isac-Aid (not red or purple)   Do not have:  Milk or milk products such as ice  cream, malts or shakes, or coffee creamer  Red or purple drinks of any kind such as cranberry juice or grape juice. Avoid red or purple Jell-O, Popsicles, Isac-Aid, sorbet, sherbet and candy  Juices with pulp such as orange, grapefruit, pineapple or tomato juice  Cream soups of any kind  Alcohol and beer  Protein drinks or protein powder     LOW FIBER DIET   You may have:    Starches: White bread, rolls, biscuits, croissants, Deborah toast, white flour tortillas, waffles, pancakes, Belarusian toast; white rice, noodles, pasta, macaroni; cooked and peeled potatoes; plain crackers, saltines; cooked farina or cream of rice; puffed rice, corn flakes, Rice Krispies, Special K   Vegetables: tender cooked and canned, vegetable broths  Fruits and fruit juices: Strained fruit juice, canned fruit without seeds or skin (not pineapple), applesauce, pear sauce, ripe bananas, melons (not watermelon)   Milk products: Milk (plain or flavored), cheese, cottage cheese, yogurt (no berries), custard, ice cream    Proteins: Tender, well-cooked ground beef, lamb, veal, ham, pork, chicken, turkey, fish or organ meats; eggs; creamy peanut butter   Fats and condiments:  Margarine, butter, oils, mayonnaise, sour cream, salad dressing, plain gravy; spices, cooked herbs; sugar, clear jelly, honey, syrup   Snacks, sweets and drinks: Pretzels, hard candy; plain cakes and cookies (no nuts or seeds); gelatin, plain pudding, sherbet, Popsicles; coffee, tea, carbonated ( fizzy ) drinks Do not have:    Starches: Breads or rolls that contain nuts, seeds or fruit; whole wheat or whole grain breads that contain more than 1 gram of fiber per slice; cornbread; corn or whole wheat tortillas; potatoes with skin; brown rice, wild rice, kasha (buckwheat), and oatmeal   Vegetables: Any raw or steamed vegetables; vegetables with seeds; corn in any form   Fruits and fruit juices: Prunes, prune juice, raisins and other dried fruits, berries and other fruits with seeds,  canned pineapple juices with pulp such as orange, grapefruit, pineapple or tomato juice  Milk products: Any yogurt with nuts, seeds or berries   Proteins: Tough, fibrous meats with gristle; cooked dried beans, peas or lentils; crunchy peanut butter  Fats and condiments: Pickles, olives, relish, horseradish; jam, marmalade, preserves   Snacks, sweets and drinks: Popcorn, nuts, seeds, granola, coconut, candies made with nuts or seeds; all desserts that contain nuts, seeds, raisins and other dried fruits, coconut, whole grains or bran.      FAQ:    How do you know if your colon is cleaned out?   After completing the bowel prep, your bowel movements should be all liquid and yellow. Your bowel movements will look similar to urine in the toilet. If there are pieces of stool (poop) in the toilet, or if you can't see to the bottom of the toilet, please call our office for advice. Call [phone] and ask to speak with a nurse.   Why do you need a responsible  to take you home and stay with you?  We require a responsible adult to take you home for your safety. The sedation medicines used to relax you during the procedure can impair your judgement and reaction time, make you forgetful and possible a little unsteady. Do not drive, make any important decisions, or sign any legal documents for 24 hours after your procedure.   It is normal to feel bloated and gassy after your procedure. Walking will help move the air through your colon. You can take non-aspirin pain relievers that contain acetaminophen (Tylenol).   When can you eat after your procedure?  You may resume your normal diet when you feel ready, unless advised otherwise by the doctor performing your procedure. Do not drink alcohol for 24 hours after your procedure.   You many resume normal activities (work, exercise, etc.) after 24 hours.   When will you get test results?  You should have your procedure results and any lab results (if applicable) by letter,  [employer] message, or phone call within 2 weeks. If you have any questions, please call the doctor that referred you for the procedure.       Thank you for choosing [facility], for your procedure. If you are sent a survey regarding your care, please take the time to complete the questionnaire. We value your feedback!

## 2024-05-07 NOTE — PROGRESS NOTES
"Betsy Negron is a 54 y.o. female who is being evaluated via a billable telephone visit.      The patient has been notified of following:     \"This telephone visit will be conducted via a call between you and your physician/provider. We have found that certain health care needs can be provided without the need for a physical exam.  This service lets us provide the care you need with a short phone conversation.  If a prescription is necessary we can send it directly to your pharmacy.  If lab work is needed we can place an order for that and you can then stop by our lab to have the test done at a later time.    Telephone visits are billed at different rates depending on your insurance coverage. During this emergency period, for some insurers they may be billed the same as an in-person visit.  Please reach out to your insurance provider with any questions.    If during the course of the call the physician/provider feels a telephone visit is not appropriate, you will not be charged for this service.\"    Patient has given verbal consent to a Telephone visit? Yes    What phone number would you like to be contacted at? [phone]        Additional provider notes: The patient had virtual phone visit on 4/14/2020.  She was prescribed Neurontin.  She is taking Neurontin 300 mg twice a day.  States it is helping.  She does not want to take 3 times a day due to drowsiness after taking this medication.  She has Tylenol also but has not been taking it.  The pain is mild now.    She sees psychiatrist for depression and anxiety.  She is complaining of blurred vision for about a month.  She states she has appointment with her eye doctor on 5/26/2020.  No other new concerns or complaints since last visit.    No acute fever or URI symptoms.  No known contact with COVID-19.    She was able to complete well during this telephone visit.  Does not appear to be in acute distress.      Assessment/Plan:  1. Pain in both lower legs  Will " continue Neurontin 300 mg twice a day.  Advised to take Tylenol as needed.    2. Hypothyroidism, unspecified type  Last TSH was mildly low.  Will defer testing due to current COVID-19 outbreak.  She will continue her current levothyroxine 75 mcg daily.  Refill sent.    3. Major depressive disorder with single episode, in partial remission (H)  Managed by psychiatry.    4. Blurred vision  Has appointment with ophthalmology on 5/26/2020.      Phone call duration:  5  minutes    Dr. Franc Wetzel  5/19/2020 11:17 AM    This transcription uses voice recognition software, which may contain typographical errors.       Normal spontaneous vaginal delivery

## 2024-06-10 RX ORDER — BISACODYL 5 MG/1
TABLET, DELAYED RELEASE ORAL
Qty: 4 TABLET | Refills: 0 | Status: SHIPPED | OUTPATIENT
Start: 2024-06-10 | End: 2024-08-22

## 2024-06-10 NOTE — TELEPHONE ENCOUNTER
Dx constipation-Extended Golytely Bowel Prep . Instructions were sent via letter. Bowel prep was sent 6/10/2024 to [id number] - [place], [place] - [address].

## 2024-06-17 ENCOUNTER — PATIENT OUTREACH (OUTPATIENT)
Dept: FAMILY MEDICINE | Facility: CLINIC | Age: 59
End: 2024-06-17
Payer: COMMERCIAL

## 2024-06-17 PROBLEM — R87.611 ATYPICAL SQUAMOUS CELLS CANNOT EXCLUDE HIGH GRADE SQUAMOUS INTRAEPITHELIAL LESION ON CYTOLOGIC SMEAR OF CERVIX (ASC-H): Status: ACTIVE | Noted: 2019-01-21

## 2024-06-17 NOTE — LETTER
June 17, 2024      Betsy Negron  [address]  [place] [address]        Dear ,    This letter is to remind you that you are due for your follow-up Pap smear and Human Papillomavirus (HPV) test.    Please call [phone] to schedule your appointment at your earliest convenience.    If you have completed the appointment outside of the [facility] system, please have the records forwarded to our office. We will update your chart for your provider to review before your next annual wellness visit.     Thank you for choosing [facility]!      Sincerely,    Your [facility] Care Team

## 2024-06-27 ENCOUNTER — TELEPHONE (OUTPATIENT)
Dept: GASTROENTEROLOGY | Facility: CLINIC | Age: 59
End: 2024-06-27
Payer: COMMERCIAL

## 2024-06-27 DIAGNOSIS — Z86.0100 HISTORY OF COLONIC POLYPS: Primary | ICD-10-CM

## 2024-06-27 NOTE — LETTER
June 27, 2024      Betsy Negron  [address] [place] [address]              Dear Betsy,        We apologize that we have been unable to contact you by phone. We are calling in regards to your upcoming Colonoscopy/Upper endoscopy (EGD) procedure that is scheduled on 7/8/24 to complete pre assessment.    Please contact our pre assessment nursing team at [phone] option 4. We are open Monday through Friday, 7:00am to 5:00pm. Note that failure to complete Nurse assessment phone call will result in your procedure being cancelled.     Our schedules fill up quickly and are in high demand. If you know that you need to cancel, please contact us so that we can accommodate scheduling for other patients. Our endoscopy scheduling team can be reached at [phone] option 2.     Thank you,  [facility] Endoscopy Team

## 2024-06-27 NOTE — TELEPHONE ENCOUNTER
Attempted to contact patient in order to complete pre assessment questions via [facility] .     No answer. Left message to return call to [phone] option 4    Callback communication sent via letter.    Corinne Kliber, RN  Endoscopy Procedure Pre Assessment RN  [phone] option 4

## 2024-06-27 NOTE — LETTER
August 13, 2024      Betsy Negron  [address] [place] [address]              Dear Betsy,          Colonoscopy/Upper endoscopy (EGD)     Procedure date: 8/20/24    Anticipated arrival time: 0830 AM   (Please note that arrival times may change)    Facility location: [facility]; [address]nd Floor, [place] [address] - Check in location: 2nd Floor at Surgery desk      Important Procedure Reminders:     Prep Instructions:   Instructions on how to prepare for your upcoming procedure are found below. Please read instructions carefully. Deviation from instructions may result in less than desired outcomes and procedure may need to be rescheduled.   If you have additional questions regarding how to prepare for your upcoming procedure, contact our endoscopy pre assessment nurses at [phone] option 4 Monday through Friday 7:00am-5:00pm     Policy:   The medications used during the procedure will make you sleepy, so you won't be able to drive. On the day of your procedure, please have an adult ready to drive you home and stay with you for the next 6 hours.   You can't use public transportation, ride-share services, or non-medical taxi services without a responsible caregiver. Medical transport services are okay, but a caregiver must be there to receive you at your destination.   Make sure your  and caregiver are confirmed before your procedure.      Day of procedure:  Please keep in mind that arrival times may change. Let your  know there might be a one-hour window for changes.  We ask that you please check in at the  with your . Your  should remain on campus.  Expect to be at the procedure center for about 1.5-2.5 hours.    Please do not wear jewelry (i.e. earrings, rings, necklaces, watches, etc). Leave your purse, billfold, credit cards, and other valuables at home.   Bring insurance card and ID.     To cancel or reschedule your  procedure:   Within 14 days of your procedure if you develop any flu-like symptoms (such as fever, cough, shortness of breath), COVID-19 like symptoms or exposure to COVID-19, contact our endoscopy team at [phone] option 4 to determine if procedure can be completed or needs to be delayed.   If you need to cancel or reschedule, our endoscopy scheduling team can be reached at [phone], option 2. Monday through Friday, 7:00am-5:00pm.      Medication Reminders:    Please note the following medication holding recommendations:   N/A    ----------------------------------------------------------------------------------------------------------------------------------------------------------------------------------------------------------------------------------------------------------------------      Standard Golytely (Colyte, Nulytely) Bowel Prep   Prep instructions for your colonoscopy   For prep questions, please call: [facility] - [address] Floor, [place] [address] - [phone] option 4    Please read these instructions carefully at least 7 days prior to your colonoscopy procedure. Be sure to follow all directions completely. The inside of your colon must be clean to allow for a complete examination for the presence of any growths, polyps, and/or abnormalities, as well as their biopsy or removal. A number of tips are included in order to make this part of the procedure as comfortable as possible.    Getting ready    prescription at the pharmacy. Endoscopy team will order this about 2 weeks before to your scheduled procedure. Included in the kit will be the followin Dulcolax (Bisacodyl) 5mg tablets  1 container of Polyethylene Glycol (Golytely, Colyte, Nulytely, Gavilyte-G)    A nurse will call you to go over your appointment details and prep instructions. Not completing the nurse call could result with your appointment being cancelled.    You must  arrange for an adult to drive you home after your exam. Your colonoscopy cannot be done unless you have a ride. If you need to use public transportation, someone must ride with you and stay with you for up to 24 hours.       7 days before procedure   Consult with your prescribing provider about stopping any:     Diabetic/Weight Loss Injectable Medication GLP-1 agonist (such as Exenatide (Byetta, Bydureon),  Mounjaro (Tirzepatide).  Ozempic (Semaglutide). Semaglutide. Symlin (Pamlintide), Tanzeum (Albiglutide). Tirzepatide-Weight Management (Zepbound), Trulicity (Dulaglutide), Victoza (Saxenda, Liraglutide), Wegovy (Semaglutide) please follow below guidelines for holding:    For weekly injection HOLD 7 days before procedure.  For once or twice a day injection HOLD the day before procedure and day of procedure.  For oral, daily dosing (Rybelsus) HOLD 7 days before procedure.    Blood thinning and/or anti platelet medications: such as Coumadin, Plavix, Xarelto, Eliquis, Lovenox or others, these medications may need to be stopped temporarily before your procedure.     If you take insulin for diabetes, ask your prescribing provider for instructions on how to manage this medication while preparing for a colonoscopy.     NSAIDs medications such as Sulindac, Celebrex, Mobic, Relafen or others may need to be stopped before the procedure. This will be discussed during nurse review call or you can reach out to your prescribing provider.     Stop taking iron (ferrous sulfate), multivitamins that contain iron, and/or fiber supplements (Metamucil, Benefiber, Psyllium husk powder, Fibercon, Bran, etc.).      Stop eating whole kernel corn, popcorn, nuts, and foods that contain seeds. These can stay in the colon for many days and they can clog up the colonoscope.       3 days before procedure     Begin a low-fiber diet (see examples below). No Olestra (a fat substitute).    Consume no more than 10-15 grams of fiber each day.     It  is important to stay hydrated. Drink at least eight 8-ounce glasses of water a day.      LOW FIBER DIET   You can have:   Do not have:    Starches: White bread, rolls, biscuits, croissants, [place] toast, white flour tortillas, waffles, pancakes, Mohawk toast; white rice, noodles, pasta, macaroni; cooked and peeled potatoes; plain crackers, saltines; cooked farina or cream of rice; puffed rice, corn flakes, Rice Krispies, Special K      Vegetables: tender cooked and canned, vegetable broths     Fruits and fruit juices: Strained fruit juice, canned fruit without seeds or skin (not pineapple), applesauce, pear sauce, ripe bananas, melons (not watermelon)     Milk products: Milk (plain or flavored), cheese, cottage cheese, yogurt (no berries), custard, ice cream       Proteins: Tender, well-cooked ground beef, lamb, veal, ham, pork, chicken, turkey, fish or organ meat, Tofu, eggs, creamy peanut butter      Fats and condiments:  Margarine, butter, oils, mayonnaise, sour cream, salad dressing, plain gravy; spices, cooked herbs; sugar, clear jelly, honey, syrup      Snacks, sweets and drinks: Pretzels, hard candy; plain cakes and cookies (no nuts or seeds); gelatin, plain pudding, sherbet, Popsicles; coffee, tea, carbonated ( fizzy ) drinks    Starches: Breads or rolls that contain nuts, seeds or fruit; whole wheat or whole grain breads that contain more than 2 grams of fiber per serving; cornbread; corn or whole wheat tortillas; potatoes with skin; brown rice, wild rice, quinoa, kasha (buckwheat), and oatmeal      Vegetables: Any raw or steamed vegetables; vegetables with seeds; corn in any form      Fruits and fruit juices: Prunes, prune juice, raisins and other dried fruits, berries and other fruits with seeds, canned pineapple juices with pulp such as orange, grapefruit, pineapple or tomato juice     Milk products: Any yogurt with nuts, seeds or berries      Proteins: Tough, fibrous meats with gristle; cooked dried  beans, peas or lentils; crunchy peanut butter     Fats and condiments: Pickles, olives, relish, horseradish; jam, marmalade, preserves      Snacks, sweets and drinks: Popcorn, nuts, seeds, granola, coconut, candies made with nuts or seeds; all desserts that contain nuts, seeds, raisins and other dried fruits, coconut, whole grains or bran.                                               1 day before procedure     You can have a light, low-fiber breakfast. But drink only clear liquids after 9 a.m. (see list below).    Drink at least eight to ten 8-ounce glasses of water throughout the day. ? ? ? ? ? ? ? ?    Fill the container of Golytely with a full gallon of warm water. Cover and shake until well mixed. Chill for 3 hours in refrigerator until it is time to drink solution.    Stop taking NSAIDs pain relievers, such as Advil, Ibuprofen, Motrin, etc. You may take Tylenol.    CLEAR LIQUID DIET:  You can have: Do not have:    Water, tea, coffee (no milk or cream)   Soda pop, Gatorade (not red or purple)   Coconut water   Jell-O, Popsicles (no milk or fruit pieces - not red or purple)   Fat-free soup broth or bouillon   Plain hard candy, such as clear life savers (not red or purple)   Clear juices and fruit-flavored drinks, such as apple juice, white grape juice, Hi-C, and Isac-Aid (not red or purple)  Milk or milk products such as ice cream, malts or shakes, or coffee creamer   Red or purple drinks of any kind such as cranberry juice, grape juice or Isac-Aid. Avoid red or purple Jell-O, Popsicles, sorbet, sherbet and candy   Juices with pulp such as orange, grapefruit, pineapple or tomato juice   Cream soups of any kind   Alcohol and beer   Protein drinks or protein powder     Step 1     At 3 PM, take 2 Dulcolax (Bisacodyl) tablets.   At 6 PM, start drinking one 8-ounce glass of Golytely mixture every 15 minutes until the container is HALF empty. Drink each glass quickly. Store the rest in the refrigerator.   Continue to  drink clear liquids    Step 2     If you arrive for your procedure BEFORE 11 AM:  At 11 PM, take 2 Dulcolax (Bisacodyl) tablets  At 11 PM, start drinking the other half of the Golytely container. Drink one 8-ounce glass every 15 minutes until the container is empty. Drink each glass quickly.    If you arrive for your procedure AFTER 11 AM:  At 6 AM, take 2 Dulcolax (Bisacodyl) tablets  At 6 AM, start drinking the other half of the Golytely container. Drink one 8-ounce glass of Golytely mixture every 15 minutes until the container is empty. Drink each glass quickly.       Reminders While Drinking Laxatives:     After you start drinking the solution, stay near a toilet. You may have watery stools (diarrhea), mild cramping, bloating, and nausea. You may want to use Vaseline on the skin around your anus after each bowel movement or use wet wipes to prevent irritation. Bowel movements will be liquid and dark in color at first and then should turn clear yellow in color. Drinking the prepared solution may make you cold, wearing warm clothing can be helpful.    Some find it helpful to:  For added flavor, include a crystal light lemonade packet in each glass of Golytely, rather than mixing it into the entire prepared mixture.   In between each glass, try sucking on a lemon or a piece of hard candy to help diminish the flavor of the preparation.  Drinking from a straw can help minimize the taste of the prepared mixture.    If you have nausea or vomiting during drinking the solution, rinse your mouth with water and take a 15-30 minute break and then continue drinking solution.     Day of procedure     2 hours before your arrival time stop drinking all liquids, including water.   Do not smoke or swallow anything, including water or gum for at least 2 hours before your arrival time. This is a safety issue. Your procedure could be cancelled if you do not follow directions.  No chewing tobacco 6 hours prior to procedure arrival  time.     You may take your necessary morning medications with sips of water (4 ounces).   Do not take diabetes medicine by mouth until after your exam.  If you have asthma, bring your inhalers.  Please perform your nebulizer treatments and airway clearance therapy in the morning prior to the procedure (if applicable).    Arrive with a responsible adult who can drive you home and stay with you for up to 24 hours. The medications used during the procedure will make you sleepy, so you won't be able to drive yourself home.   You cannot use public transportation, ride-share services, or non-medical taxi services without a responsible caregiver. Medical transport services are okay, but a caregiver must be there to receive you at your destination.  Please check in with your  when you arrive. Drivers should stay on campus.    Expect to be at the procedure center for about 1.5-2.5 hours.    Do not wear jewelry (i.e. earrings, rings, necklaces, watches, etc.). Leave your purse, billfold, credit cards, and other valuables at home.      Bring insurance card and ID.       Answers to Commonly Asked Questions     How soon can I eat after the procedure?  You may resume your normal diet when you feel ready, unless advised otherwise by the doctor performing your procedure. We recommend starting with a light meal.   Do not drink alcohol for 24 hours after your procedure.  You may resume normal activities (work, exercise, etc.) after 24 hours.    How will I feel after the procedure?  It is normal to feel bloated and gassy after your procedure. Walking will help move the air through your colon. You can take non-aspirin pain relievers that contain acetaminophen (Tylenol).  If you are having sedation, we require a responsible adult to take you home for your safety. The sedation medicines used to relax you during the procedure can impair your judgement and reaction time, and make you forgetful and possibly a little unsteady.  Do not  drive, make any important decisions, or sign any legal documents for 24 hours after your procedure.    When will I get my test results?  You should have your procedure results and any lab results (if applicable) by letter, MyChart message, or phone call within 2 weeks. If you have any questions, please call the doctor that referred you for the procedure.    How do I know if my colon is cleaned out?   After completing the bowel prep, your bowel movements should be all liquid and yellow. Your bowel movements will look similar to urine in the toilet. If there are pieces of stool (poop) in the toilet, or if you can't see to the bottom of the toilet, please call our office for advice. Call [phone] and ask to speak with a nurse.    Why is the Golytely bowel prep taken in several steps?   The stool is flushed out by a large wave of fluid going through the colon. Just sipping a large volume of the solution will not achieve the desired result. Studies have shown that two smaller waves (or more in some cases) are better than one large one.      What if I need to cancel or reschedule my procedure?  Contact our endoscopy scheduling team at [phone], option 2. Monday through Friday, 7:00am-5:00pm.

## 2024-06-27 NOTE — TELEPHONE ENCOUNTER
Pre visit planning completed.      Procedure details:    Patient scheduled for Colonoscopy/Upper endoscopy (EGD) on 7/8/24.     Arrival time: 1050. Procedure time 1135    Facility location: [facility]; [address], [place] [address]. Check in location: 2nd Floor at Surgery desk.    Sedation type: Conscious sedation   --   all previous endoscopies with MAC.  Discuss sedation with patient.    Pre op exam needed? N/A    Indication for procedure:   Fullness of abdomen [R19.8]  - Primary      Gastroesophageal reflux disease, unspecified whether esophagitis present [K21.9]            Chart review:     Electronic implanted devices? No    Recent diagnosis of diverticulitis within the last 6 weeks? No    Diabetic? No      Medication review:    Anticoagulants? No    NSAIDS? No NSAID medications per patient's medication list.  RN will verify with pre-assessment call.    Other medication HOLDING recommendations:  N/A      Prep for procedure:     Bowel prep recommendation: Extended Golytely. Bowel prep prescription sent to [id number] - [place], [place] - [address]   Due to: constipation noted or reported.     Prep instructions sent via letter  sent 6/10/24         Corinne Kliber, RN  Endoscopy Procedure Pre Assessment RN  [phone] option 4

## 2024-07-01 NOTE — TELEPHONE ENCOUNTER
Second call attempt to complete pre assessment.     No answer.  Left message via [employer]  to return call to [phone] #4 by next business day prior to 4PM or procedure will be sent to cancel.     Callback required communication sent via  Pt not active on [employer] . No letter generated as it will not arrive on time.       Aggie Gautam RN  Endoscopy Procedure Pre Assessment

## 2024-07-03 ENCOUNTER — TELEPHONE (OUTPATIENT)
Dept: GASTROENTEROLOGY | Facility: CLINIC | Age: 59
End: 2024-07-03
Payer: COMMERCIAL

## 2024-07-03 NOTE — TELEPHONE ENCOUNTER
No return call for PA. Staff message sent to cancel.     Aggie Gautam, RN   Endoscopy Procedure Pre Assessment RN

## 2024-07-03 NOTE — TELEPHONE ENCOUNTER
----- Message from Aggie WILSON sent at 7/3/2024  8:01 AM CDT -----  Regarding: cancel  Please cancel EGD/Colonoscopy on 7/8 due to no return call for PA. Thank you.     Aggie

## 2024-07-03 NOTE — LETTER
July 3, 2024  Re: Stacypeaec Negron  YOB: 1965    Dear Colleague,    Thank you for your referral to the [facility] [place]. Unfortunately, we had to cancel the patient's procedure due to patient not completing the required pre-assessment call.      If you have any questions or concerns, please contact our office at Dept: [phone].        Sincerely,  [facility] [place]

## 2024-07-03 NOTE — TELEPHONE ENCOUNTER
Caller: No call    Reason for Reschedule/Cancellation   (please be detailed, any staff messages or encounters to note?): Cancellation policy - pre-assessment call not completed.      Prior to reschedule please review:  Ordering Provider: Shabana Erwin MD   Sedation Determined: Moderate  Does patient have any ASC Exclusions, please identify?: No      Notes on Cancelled Procedure:  Procedure: Upper and Lower Endoscopy [EGD and Colonoscopy]   Date: 7/8/2024  Location: [facility]; [address] [place] [address], 2nd Floor, [place] [address]   Surgeon: Rory      Rescheduled: No, sent letter and CTL.        Did you cancel or rescheduled an EUS procedure? No.     CASE IN DEPOT.

## 2024-07-05 NOTE — TELEPHONE ENCOUNTER
Caller: Betsy         Rescheduled: Yes,   Procedure: Upper and Lower Endoscopy [EGD and Colonoscopy]    Date: 08/20/2024   Location: [facility]; [address] Floor, [place] [address]    Surgeon: Tay   Sedation Level Scheduled  Mod ,  Reason for Sedation Level Order   Instructions updated and sent: Y     Does patient need PAC or Pre -Op Rescheduled? : n       Did you cancel or rescheduled an EUS procedure? No.

## 2024-07-08 DIAGNOSIS — K21.9 GASTROESOPHAGEAL REFLUX DISEASE WITHOUT ESOPHAGITIS: ICD-10-CM

## 2024-07-08 NOTE — PROGRESS NOTES
7/8/24 called GI to check on pt appointment for colonoscopy and EGD. Pt is scheduled for both on August 20 th at [facility].

## 2024-07-15 ENCOUNTER — TELEPHONE (OUTPATIENT)
Dept: FAMILY MEDICINE | Facility: CLINIC | Age: 59
End: 2024-07-15

## 2024-07-15 ENCOUNTER — OFFICE VISIT (OUTPATIENT)
Dept: FAMILY MEDICINE | Facility: CLINIC | Age: 59
End: 2024-07-15
Payer: COMMERCIAL

## 2024-07-15 VITALS
BODY MASS INDEX: 25.19 KG/M2 | SYSTOLIC BLOOD PRESSURE: 112 MMHG | WEIGHT: 120 LBS | TEMPERATURE: 98 F | HEIGHT: 58 IN | DIASTOLIC BLOOD PRESSURE: 45 MMHG | RESPIRATION RATE: 20 BRPM | OXYGEN SATURATION: 96 % | HEART RATE: 54 BPM

## 2024-07-15 DIAGNOSIS — H04.129 EYE DRYNESS: ICD-10-CM

## 2024-07-15 DIAGNOSIS — H53.8 FLASHING LIGHTS: Primary | ICD-10-CM

## 2024-07-15 DIAGNOSIS — K21.9 GASTROESOPHAGEAL REFLUX DISEASE WITHOUT ESOPHAGITIS: ICD-10-CM

## 2024-07-15 DIAGNOSIS — H53.8 BLURRED VISION: ICD-10-CM

## 2024-07-15 PROCEDURE — 99214 OFFICE O/P EST MOD 30 MIN: CPT | Performed by: FAMILY MEDICINE

## 2024-07-15 RX ORDER — KETOTIFEN FUMARATE 0.35 MG/ML
1 SOLUTION/ DROPS OPHTHALMIC 2 TIMES DAILY
Qty: 10 ML | Refills: 2 | Status: SHIPPED | OUTPATIENT
Start: 2024-07-15

## 2024-07-15 NOTE — PATIENT INSTRUCTIONS
-Thank you for choosing the [facility].  -It was a pleasure to see you today.  -Please take a look at the information below for more specific details regarding the treatment plan and recommendations.  -In this after visit summary is a list of your medications and specific instructions.  Please review this carefully as there may be changes made to your medication list.  -If there are any particular questions or concerns, please feel free to reach out to Dr. Cordova.  -If any labs have been completed, we will reach out to you about results.  If the results are normal or not concerning, a letter or [email or web address] message will be sent to you.  If any follow-up is needed, either Dr. Cordova or the nurse will give you a call.  If you have not heard regarding results after 2 weeks, please reach out to the clinic.    Patient Instructions:    -Use the eye drop as prescribed.   -See the eye doctor within the next 3-5 days.  Please contact the clinic if you have any difficulties with your appointment.  -Even with the eyedrop medication, Dr. Cordova highly recommends that you be seen by the eye doctor for better evaluation of the health of the back of the eye.      Please seek immediate medical attention (go to the emergency room or urgent care) for the following reasons: worsening symptoms, severe headaches, nausea, vomiting, fevers, chills, bad eye pain or any concerning changes.      --------------------------------------------------------------------------------------------------------------------    -We are always looking for ways to improve.  You may be selected to receive a survey regarding your visit today.  We encourage you to complete the survey and provide specific, constructive feedback to help us improve our processes.  Thank you for your time!  -Please review the contact information listed on the after visit summary and in the electronic chart.  Below is the phone number that we have on file.  If there are  any changes that are needed to be made, please reach out to the clinic.  [phone] (home)

## 2024-07-15 NOTE — TELEPHONE ENCOUNTER
Patient Quality Outreach    Patient is due for the following:   Cervical Cancer Screening - PAP Needed  Physical Preventive Adult Physical    Next Steps:   Schedule a Adult Preventative    Type of outreach:    Phone, spoke to patient/parent. Scheduled    Next Steps:  Reach out within 90 days via Phone.    Max number of attempts reached: No. Will try again in 90 days if patient still on fail list.    Questions for provider review:    None           Blanquita Mendoza  Chart routed to Care Team.

## 2024-07-15 NOTE — PROGRESS NOTES
OFFICE VISIT    Assessment/Plan:     Patient Instructions:    -Use the eye drop as prescribed.   -See the eye doctor within the next 3-5 days.  Please contact the clinic if you have any difficulties with your appointment.  -Even with the eyedrop medication, Dr. Cordova highly recommends that you be seen by the eye doctor for better evaluation of the health of the back of the eye.      Please seek immediate medical attention (go to the emergency room or urgent care) for the following reasons: worsening symptoms, severe headaches, nausea, vomiting, fevers, chills, bad eye pain or any concerning changes.      Betsy was seen today for eye problem and other.  Diagnoses and all orders for this visit:    Flashing lights  Blurred vision  Eye dryness: unable to visualize the retina due to presence of cataracts. Recommended patient be seen by eye specialist as below.   -     ketotifen fumarate 0.035% 0.035 % SOLN ophthalmic solution; Place 1 drop into both eyes 2 times daily  -     Adult Eye  Referral; Future    Gastroesophageal reflux disease without esophagitis: stable. Refill as below. Check H pylori. No previous testing noted.   -     omeprazole (PRILOSEC) 20 MG DR capsule; TAKE 1 CAPSULE BY MOUTH EVERY MORNING 30 MINUTES BEFORE EATING  -     Helicobacter pylori Antigen Stool; Future      Return if symptoms worsen or fail to improve.    The diagnoses, treatment options, risk, benefits, and recommendations were reviewed with patient/guardian.  Questions were answered to patient's/guardian satisfaction.  Red flag signs were reviewed.  Patient/guardian is in agreement with above plan.      Patient was brought to the specialty 's to make an appointment with her eye specialist.    Subjective: 59 year old female with history of hypothyroidism, chronic gastritis, history of cholecystectomy, constipation, colonic polyp, moderate major depression who presents to clinic for the following complaints:   Patient presents  with:  Eye Problem  Other: Want doctor to resend Omeprazole    Answers submitted by the patient for this visit:  General Questionnaire (Submitted on 7/15/2024)  Chief Complaint: Chronic problems general questions HPI Form  What is the reason for your visit today? : Check eye ( seeing flashlight)  How many servings of fruits and vegetables do you eat daily?: 2-3  On average, how many sweetened beverages do you drink each day (Examples: soda, juice, sweet tea, etc.  Do NOT count diet or artificially sweetened beverages)?: 0  How many minutes a day do you exercise enough to make your heart beat faster?: 9 or less  How many days a week do you exercise enough to make your heart beat faster?: 3 or less  How many days per week do you miss taking your medication?: 0      Eye problem: Patient indicates that she has been seeing flashing lights. On Thursday/Friday, she was cleaning the garage and was seeing a mosquito flying from side to side. She came home and it seems like she saw flashing lights, as if there were firecrackers in her vision. She used a Dale medication and that helped. There are times where she still sees the flashing lights, though it is improved. Last year, she had been seen by the eye doctor and was given some medications and was told to make an appointment to do a procedure for the eyes, but she was scared and could still see okay, so she declined the surgery at that time. She hasn't called back to schedule an appointment. She can still see, but it is blurry. The small letters, she can't see it very clearly. No eye pain noted. She feels like the left eye seems a bit sore. Denies fevers, pus, injury, severe headaches, vomiting. There is some sticky materials in the mornings, though would be okay after wiping the eye. She has some eye dryness on some days.      Medication refill:   -Patient requesting refill for omeprazole 20 mg.  She was not able to get the medication and so would like to continue  "prescription.  Order sent on 7/9/2024.  Has a history of GERD. If she takes this medication, it helps reduce the stomach pains and the saliva would be a dark yellow color, like water mixed with dirt. She has reflux in the mornings. When she doesn't take the medication, then she has these symptoms.       The 10 point review of system is negative except as stated in the HPI.    Allergies were reviewed and updated.    Objective:   /45   Pulse 54   Temp 98  F (36.7  C) (Oral)   Resp 20   Ht 1.473 m (4' 10\")   Wt 54.4 kg (120 lb)   SpO2 96%   BMI 25.08 kg/m    General: Active, alert, nontoxic-appearing.  No acute distress.  HEENT: Normocephalic, atraumatic.  PERRLA  Sclera is clear.  Normal conjunctiva.  EOM intact.  Cataracts noted.  Unable to visualize the retina or blood vessels of the posterior eye.  Normal external ears. Nares patent.  Moist mucous membranes.    Cardiac: RRR.  S1, S2 present.  No murmurs, rubs, or gallops.  Respiratory/chest: Clear to auscultation bilaterally.  No wheezes, rales, rhonchi.  Breathing is not labored.  No accessory muscle usage.  Abdomen: Soft, nondistended, nontender.  No masses or organomegaly noted.  No guarding or rebound tenderness appreciated.  Extremities: Voluntary movements intact.  Integumentary: No concerning rash or skin changes appreciated.        Kimani Cordova MD  [facility] [place] [address]  Phone: [phone]  Fax: [phone]    7/15/2024  6:23 PM          Current Outpatient Medications   Medication Sig Dispense Refill    ketotifen fumarate 0.035% 0.035 % SOLN ophthalmic solution Place 1 drop into both eyes 2 times daily 10 mL 2    levothyroxine (SYNTHROID/LEVOTHROID) 75 MCG tablet Take 1 tablet (75 mcg) by mouth daily 90 tablet 3    omeprazole (PRILOSEC) 20 MG DR capsule TAKE 1 CAPSULE BY MOUTH EVERY MORNING 30 MINUTES BEFORE EATING 90 capsule 0    bisacodyl (DULCOLAX) 5 MG EC tablet Two days prior to exam take two (2) " tablets at 4pm. One day prior to exam take two (2) tablets at 4pm 4 tablet 0    fluticasone (FLONASE) 50 MCG/ACT nasal spray Spray 2 sprays into both nostrils daily (Patient not taking: Reported on 4/1/2024) 16 g 11    ibuprofen (ADVIL/MOTRIN) 600 MG tablet Take one tablet three times daily as needed for pain- take with food. (Patient not taking: Reported on 4/1/2024) 90 tablet 3    polyethylene glycol (GOLYTELY) 236 g suspension Two days before procedure at 5PM fill first container with water. Mix and drink an 8 oz glass every 10-15 minutes until HALF of the container is gone. Place the remainder in the refrigerator. One day before procedure at 5PM drink second half of bowel prep. Drink an 8 oz glass every 10-15 minutes until it is gone. Day of procedure 6 hours before arrival time fill the 2nd container with water. Mix and drink an 8 oz glass every 10-15 minutes until HALF of the container is gone. Discard the remaining solution. (Patient not taking: Reported on 7/15/2024) 8000 mL 0    vitamin D3 (CHOLECALCIFEROL) 125 MCG (5000 UT) tablet Take 1 tablet (125 mcg) by mouth once a week (Patient not taking: Reported on 7/24/2023) 100 tablet 3     No current facility-administered medications for this visit.       No Known Allergies    Patient Active Problem List    Diagnosis Date Noted    Moderate major depression (H) 12/02/2020     Priority: Medium    Pain in both lower legs 05/19/2020     Priority: Medium    Grief reaction 04/07/2020     Priority: Medium    Atypical squamous cells cannot exclude high grade squamous intraepithelial lesion on cytologic smear of cervix (ASC-H) 01/21/2019     Priority: Medium     2005 ASCUS, Neg HPV  10/1/14 LSIL, Neg HPV  1/21/19 ASC-H, Neg HPV  7/3/23 NIL, Neg HPV. Plan cotest in 1 year due 07/3/24. Letter sent  06/17/24 Reminder Letter   07/15/24 annual--notes added       History of cholecystectomy 04/04/2018     Priority: Medium    Anxiety 09/28/2017     Priority: Medium     Constipation, unspecified constipation type 2017     Priority: Medium    Chronic bilateral low back pain without sciatica 2017     Priority: Medium    Hypothyroidism      Priority: Medium     Created by Conversion  Replacement Utility updated for latest IMO load        Rotator cuff tear, right 2016     Priority: Medium    Vitamin D deficiency 2016     Priority: Medium    Colon polyp 2015     Priority: Medium     Colonoscopy 19  Cecum: nl polyp removed; ascending colon 1 tubular   adenoma and one sessile serrated adenoma; transverse colon tubular   adenoma.  Needs colonoscopy in May of 2022        Chronic gastritis 2015     Priority: Medium     H. Pylori neg from bx  2015           Family History   Problem Relation Age of Onset    No Known Problems Mother     No Known Problems Father     Breast Cancer No family hx of     Glaucoma No family hx of     Macular Degeneration No family hx of        Past Surgical History:   Procedure Laterality Date     SECTION      REPAIR PTOSIS          Social History     Socioeconomic History    Marital status:      Spouse name: Not on file    Number of children: Not on file    Years of education: Not on file    Highest education level: Not on file   Occupational History    Not on file   Tobacco Use    Smoking status: Never     Passive exposure: Never    Smokeless tobacco: Never   Vaping Use    Vaping status: Never Used   Substance and Sexual Activity    Alcohol use: No    Drug use: No    Sexual activity: Not on file   Other Topics Concern    Not on file   Social History Narrative    Not on file     Social Determinants of Health     Financial Resource Strain: Not on file   Food Insecurity: Not on file   Transportation Needs: Not on file   Physical Activity: Not on file   Stress: Not on file   Social Connections: Not on file   Interpersonal Safety: Low Risk  (2024)    Interpersonal Safety     Do you feel physically and  emotionally safe where you currently live?: Yes     Within the past 12 months, have you been hit, slapped, kicked or otherwise physically hurt by someone?: No     Within the past 12 months, have you been humiliated or emotionally abused in other ways by your partner or ex-partner?: No   Housing Stability: Not on file

## 2024-07-17 PROCEDURE — 87338 HPYLORI STOOL AG IA: CPT | Performed by: FAMILY MEDICINE

## 2024-07-18 ENCOUNTER — TELEPHONE (OUTPATIENT)
Dept: FAMILY MEDICINE | Facility: CLINIC | Age: 59
End: 2024-07-18
Payer: COMMERCIAL

## 2024-07-18 DIAGNOSIS — A04.8 H. PYLORI INFECTION: Primary | ICD-10-CM

## 2024-07-18 LAB — H PYLORI AG STL QL IA: POSITIVE

## 2024-07-18 RX ORDER — CLARITHROMYCIN 500 MG
500 TABLET ORAL 2 TIMES DAILY
Qty: 28 TABLET | Refills: 0 | Status: SHIPPED | OUTPATIENT
Start: 2024-07-18 | End: 2024-08-01

## 2024-07-18 RX ORDER — AMOXICILLIN 500 MG/1
1000 CAPSULE ORAL 2 TIMES DAILY
Qty: 56 CAPSULE | Refills: 0 | Status: SHIPPED | OUTPATIENT
Start: 2024-07-18 | End: 2024-08-01

## 2024-07-18 NOTE — TELEPHONE ENCOUNTER
Called pt with help from a jose  and left a message to call clinic back.      Deven Andrews, BSN RN  [facility]

## 2024-07-18 NOTE — TELEPHONE ENCOUNTER
Team - please call patient with results.    Cora Negron,    I hope you have been well since our last visit. Below are the results from the testing completed at the visit.     The stool test shows that you have an infection with the H. pylori bacteria.  This can help explain the symptoms you are having.  This is a strong bacteria and needs treatment with 3 different medications.  These medications have been sent to the pharmacy.  Please be sure to start and finish all the medications as prescribed.    Please plan to return to clinic in 6 weeks to follow-up to see how you are doing and to test for cure.    Otherwise, Dr. Cordova recommends that you continue on the plan as discussed in clinic.    Best wishes,           Kimani Cordova MD  [facility]  7/18/2024  4:04 PM    Diagnoses and all orders for this visit:    H. pylori infection  -     amoxicillin (AMOXIL) 500 MG capsule; Take 2 capsules (1,000 mg) by mouth 2 times daily for 14 days  -     omeprazole (PRILOSEC) 20 MG DR capsule; Take 1 capsule (20 mg) by mouth 2 times daily for 14 days  -     clarithromycin (BIAXIN) 500 MG tablet; Take 1 tablet (500 mg) by mouth 2 times daily for 14 days  -     Helicobacter pylori Antigen Stool; Future

## 2024-07-19 NOTE — TELEPHONE ENCOUNTER
FYI - Status Update    Who is Calling: patient    Update: Patient is returning call to clinic and received the previous message regarding H Pylori results and to  medication at her pharmacy. To F/U in 6 wks. There were no questions or concerns at this time.    Does caller want a call/response back: No

## 2024-07-19 NOTE — TELEPHONE ENCOUNTER
2nd attempt---Called patient, unable to reach patient, left voicemail. Please relay provider message if patient calls back. Thank you.       Kandice Muller, MSN, RN   [facility]

## 2024-08-11 NOTE — TELEPHONE ENCOUNTER
Rescheduled procedure d/t pre-assessment call not completed for 7/8/2024 EGD.    Pre-Visit Planning Complete for R/S Procedure    Patient scheduled for Colonoscopy/Upper endoscopy (EGD) on 8.20.2024.    Arrival time: 0830. Procedure time 0915    Facility location: [facility]; [address], [place] [address]    Sedation type: Conscious sedation-- all previous endoscopies with MAC.  Discuss sedation with patient.     Pre op exam needed? No.    No new medical events or medications since last PVP review.    Verify bowel habits. RN still needs to resend out bowel prep instructions.    Xi Celaya RN  Endoscopy Procedure Pre Assessment

## 2024-08-13 ENCOUNTER — VIRTUAL VISIT (OUTPATIENT)
Dept: INTERPRETER SERVICES | Facility: CLINIC | Age: 59
End: 2024-08-13
Payer: COMMERCIAL

## 2024-08-13 RX ORDER — BISACODYL 5 MG/1
TABLET, DELAYED RELEASE ORAL
Qty: 4 TABLET | Refills: 0 | Status: SHIPPED | OUTPATIENT
Start: 2024-08-13 | End: 2024-08-22

## 2024-08-13 NOTE — TELEPHONE ENCOUNTER
Pre assessment completed for upcoming procedure.    Via [employer]     Procedure details:      Patient scheduled for Colonoscopy/Upper endoscopy (EGD) on 8.20.2024.     Arrival time: 0830. Procedure time 0915     Facility location: [facility]; [address], [place] [address]     Sedation type: Conscious sedation-- all previous endoscopies with MAC. Patient is aware of difference and agreeable to proceed     Pre op exam needed? No.    Designated  policy reviewed. Instructed to have someone stay 6 hours post procedure.       Medication review:    Weight loss medication/injectable? No.    NSAIDS? No      Prep for procedure:     Bowel prep recommendation: Standard Golytely.     Bowel prep prescription sent to   [id number] - [place], [place] - [address] [facility]  Due to: language barrier.     Prep instructions sent via letter     Reviewed procedure prep instructions.     Patient verbalized understanding and had no questions or concerns at this time.        Michelle Ellison RN  Endoscopy Procedure Pre Assessment   [phone] option 4

## 2024-08-20 ENCOUNTER — HOSPITAL ENCOUNTER (OUTPATIENT)
Facility: AMBULATORY SURGERY CENTER | Age: 59
Discharge: HOME | End: 2024-08-20
Attending: SURGERY | Admitting: SURGERY
Payer: COMMERCIAL

## 2024-08-20 ENCOUNTER — PATIENT OUTREACH (OUTPATIENT)
Dept: GASTROENTEROLOGY | Facility: CLINIC | Age: 59
End: 2024-08-20
Payer: COMMERCIAL

## 2024-08-20 VITALS
HEART RATE: 44 BPM | TEMPERATURE: 97.2 F | RESPIRATION RATE: 16 BRPM | OXYGEN SATURATION: 96 % | SYSTOLIC BLOOD PRESSURE: 101 MMHG | DIASTOLIC BLOOD PRESSURE: 51 MMHG

## 2024-08-20 LAB
COLONOSCOPY: NORMAL
UPPER GI ENDOSCOPY: NORMAL

## 2024-08-20 PROCEDURE — G0121 COLON CA SCRN NOT HI RSK IND: HCPCS

## 2024-08-20 PROCEDURE — G8907 PT DOC NO EVENTS ON DISCHARG: HCPCS

## 2024-08-20 PROCEDURE — 43235 EGD DIAGNOSTIC BRUSH WASH: CPT

## 2024-08-20 PROCEDURE — G8918 PT W/O PREOP ORDER IV AB PRO: HCPCS

## 2024-08-20 RX ORDER — NALOXONE HYDROCHLORIDE 0.4 MG/ML
0.4 INJECTION, SOLUTION INTRAMUSCULAR; INTRAVENOUS; SUBCUTANEOUS
Status: DISCONTINUED | OUTPATIENT
Start: 2024-08-20 | End: 2024-08-21 | Stop reason: HOSPADM

## 2024-08-20 RX ORDER — ONDANSETRON 4 MG/1
4 TABLET, ORALLY DISINTEGRATING ORAL EVERY 6 HOURS PRN
Status: DISCONTINUED | OUTPATIENT
Start: 2024-08-20 | End: 2024-08-21 | Stop reason: HOSPADM

## 2024-08-20 RX ORDER — FENTANYL CITRATE 50 UG/ML
INJECTION, SOLUTION INTRAMUSCULAR; INTRAVENOUS PRN
Status: DISCONTINUED | OUTPATIENT
Start: 2024-08-20 | End: 2024-08-20 | Stop reason: HOSPADM

## 2024-08-20 RX ORDER — NALOXONE HYDROCHLORIDE 0.4 MG/ML
0.2 INJECTION, SOLUTION INTRAMUSCULAR; INTRAVENOUS; SUBCUTANEOUS
Status: DISCONTINUED | OUTPATIENT
Start: 2024-08-20 | End: 2024-08-21 | Stop reason: HOSPADM

## 2024-08-20 RX ORDER — ONDANSETRON 2 MG/ML
4 INJECTION INTRAMUSCULAR; INTRAVENOUS EVERY 6 HOURS PRN
Status: DISCONTINUED | OUTPATIENT
Start: 2024-08-20 | End: 2024-08-21 | Stop reason: HOSPADM

## 2024-08-20 RX ORDER — PROCHLORPERAZINE MALEATE 10 MG
10 TABLET ORAL EVERY 6 HOURS PRN
Status: DISCONTINUED | OUTPATIENT
Start: 2024-08-20 | End: 2024-08-21 | Stop reason: HOSPADM

## 2024-08-20 RX ORDER — ONDANSETRON 2 MG/ML
4 INJECTION INTRAMUSCULAR; INTRAVENOUS
Status: DISCONTINUED | OUTPATIENT
Start: 2024-08-20 | End: 2024-08-21 | Stop reason: HOSPADM

## 2024-08-20 RX ORDER — FLUMAZENIL 0.1 MG/ML
0.2 INJECTION, SOLUTION INTRAVENOUS
Status: ACTIVE | OUTPATIENT
Start: 2024-08-20 | End: 2024-08-20

## 2024-08-20 RX ORDER — LIDOCAINE 40 MG/G
CREAM TOPICAL
Status: DISCONTINUED | OUTPATIENT
Start: 2024-08-20 | End: 2024-08-21 | Stop reason: HOSPADM

## 2024-08-20 NOTE — H&P
Patient seen for Endoscopy    HPI:  Patient is a 59 year old female here for endoscopy. Not taking blood thinning medications. No MI or CVA history. No issues with previous sedation. No recent acute illness.    Review Of Systems    Skin: negative  Ears/Nose/Throat: negative  Respiratory: No shortness of breath, dyspnea on exertion, cough, or hemoptysis  Cardiovascular: negative  Gastrointestinal: negative  Genitourinary: negative  Musculoskeletal: negative  Neurologic: negative  Hematologic/Lymphatic/Immunologic: negative  Endocrine: negative      Past Medical History:   Diagnosis Date    Depression     GERD (gastroesophageal reflux disease)     Hypothyroidism     Nonsenile cataract        Past Surgical History:   Procedure Laterality Date     SECTION      REPAIR PTOSIS         Family History   Problem Relation Age of Onset    No Known Problems Mother     No Known Problems Father     Breast Cancer No family hx of     Glaucoma No family hx of     Macular Degeneration No family hx of        Social History     Socioeconomic History    Marital status:      Spouse name: Not on file    Number of children: Not on file    Years of education: Not on file    Highest education level: Not on file   Occupational History    Not on file   Tobacco Use    Smoking status: Never     Passive exposure: Never    Smokeless tobacco: Never   Vaping Use    Vaping status: Never Used   Substance and Sexual Activity    Alcohol use: No    Drug use: No    Sexual activity: Not on file   Other Topics Concern    Not on file   Social History Narrative    Not on file     Social Determinants of Health     Financial Resource Strain: Not on file   Food Insecurity: Not on file   Transportation Needs: Not on file   Physical Activity: Not on file   Stress: Not on file   Social Connections: Not on file   Interpersonal Safety: Low Risk  (2024)    Interpersonal Safety     Do you feel physically and emotionally safe where you currently live?:  Yes     Within the past 12 months, have you been hit, slapped, kicked or otherwise physically hurt by someone?: No     Within the past 12 months, have you been humiliated or emotionally abused in other ways by your partner or ex-partner?: No   Housing Stability: Not on file       Current Outpatient Medications   Medication Sig Dispense Refill    levothyroxine (SYNTHROID/LEVOTHROID) 75 MCG tablet Take 1 tablet (75 mcg) by mouth daily 90 tablet 3    bisacodyl (DULCOLAX) 5 MG EC tablet Take 2 tablets at 3 pm the day before your procedure. If your procedure is before 11 am, take 2 additional tablets at 11 pm. If your procedure is after 11 am, take 2 additional tablets at 6 am. For additional instructions refer to your colonoscopy prep instructions. 4 tablet 0    bisacodyl (DULCOLAX) 5 MG EC tablet Two days prior to exam take two (2) tablets at 4pm. One day prior to exam take two (2) tablets at 4pm 4 tablet 0    ketotifen fumarate 0.035% 0.035 % SOLN ophthalmic solution Place 1 drop into both eyes 2 times daily 10 mL 2    omeprazole (PRILOSEC) 20 MG DR capsule TAKE 1 CAPSULE BY MOUTH EVERY MORNING 30 MINUTES BEFORE EATING 90 capsule 0    polyethylene glycol (GOLYTELY) 236 g suspension The night before the exam at 6 pm drink an 8-ounce glass every 15 minutes until the jug is half empty. If you arrive before 11 AM: Drink the other half of the Golytely jug at 11 PM night before procedure. If you arrive after 11 AM: Drink the other half of the Golytely jug at 6 AM day of procedure. For additional instructions refer to your colonoscopy prep instructions. 4000 mL 0       Medications and history reviewed    Physical exam:  Vitals: There were no vitals taken for this visit.  BMI= There is no height or weight on file to calculate BMI.    Constitutional: Healthy, alert, non-distressed   Head: Normo-cephalic, atraumatic, no lesions, masses or tenderness   Cardiovascular: RRR, no new murmurs, +S1, +S2 heart sounds, no clicks, rubs  or gallops   Respiratory: CTAB, no rales, rhonchi or wheezing, equal chest rise, good respiratory effort   Gastrointestinal: Soft, non-tender, non distended, no rebound rigidity or guarding, no masses or hernias palpated   : Deferred  Musculoskeletal: Moves all extremities, normal  strength, no deformities noted   Skin: No suspicious lesions or rashes   Psychiatric: Mentation appears normal, affect appropriate   Hematologic/Lymphatic/Immunologic: Normal cervical and supraclavicular lymph nodes   Patient able to get up on table without difficulty.    Labs show:  No results found for this or any previous visit (from the past 24 hour(s)).    Assessment: Endoscopy  Plan: Pt cleared for anesthesia for proposed procedure.    [place], DO

## 2024-08-22 ENCOUNTER — OFFICE VISIT (OUTPATIENT)
Dept: FAMILY MEDICINE | Facility: CLINIC | Age: 59
End: 2024-08-22
Payer: COMMERCIAL

## 2024-08-22 VITALS
BODY MASS INDEX: 25.19 KG/M2 | OXYGEN SATURATION: 96 % | DIASTOLIC BLOOD PRESSURE: 64 MMHG | TEMPERATURE: 98.4 F | HEIGHT: 58 IN | WEIGHT: 120 LBS | SYSTOLIC BLOOD PRESSURE: 110 MMHG | HEART RATE: 55 BPM | RESPIRATION RATE: 18 BRPM

## 2024-08-22 DIAGNOSIS — A04.8 H. PYLORI INFECTION: ICD-10-CM

## 2024-08-22 DIAGNOSIS — E03.9 HYPOTHYROIDISM, UNSPECIFIED TYPE: ICD-10-CM

## 2024-08-22 DIAGNOSIS — Z01.818 PRE-OP EXAM: Primary | ICD-10-CM

## 2024-08-22 DIAGNOSIS — H26.9 CATARACT OF BOTH EYES, UNSPECIFIED CATARACT TYPE: ICD-10-CM

## 2024-08-22 LAB — TSH SERPL DL<=0.005 MIU/L-ACNC: 3.33 UIU/ML (ref 0.3–4.2)

## 2024-08-22 PROCEDURE — 99214 OFFICE O/P EST MOD 30 MIN: CPT | Performed by: FAMILY MEDICINE

## 2024-08-22 PROCEDURE — G2211 COMPLEX E/M VISIT ADD ON: HCPCS | Performed by: FAMILY MEDICINE

## 2024-08-22 PROCEDURE — 84443 ASSAY THYROID STIM HORMONE: CPT | Performed by: FAMILY MEDICINE

## 2024-08-22 PROCEDURE — 36415 COLL VENOUS BLD VENIPUNCTURE: CPT | Performed by: FAMILY MEDICINE

## 2024-08-22 NOTE — PROGRESS NOTES
Preoperative Evaluation  [address]  [place] [address]  Phone: [phone]  Fax: [phone]  Primary Provider: Kranthi Smith MD  Pre-op Performing Provider: Kimani Cordova MD  Aug 22, 2024           8/22/2024   Surgical Information   What procedure is being done? cataracts   Facility or Hospital where procedure/surgery will be performed: [facility]   Who is doing the procedure / surgery? Dr Marie Crowley   Date of surgery / procedure: 9/16/2024  and 10/4/2024   Time of surgery / procedure: TBD/left cataract   Where do you plan to recover after surgery? at home with family        Fax number for surgical facility:     Assessment & Plan     The proposed surgical procedure is considered LOW risk.    Recommendation  Approval given to proceed with proposed procedure.    Patient is cleared for the procedure at this time.    Betsy was seen today for pre-op exam.  Diagnoses and all orders for this visit:    Pre-op exam  Cataract of both eyes, unspecified cataract type    Patient Instructions:  -You are cleared for surgery at this time.   -Limit/avoid usage of NSAIDs (such as ibuprofen/naproxen) within 4 days of the procedure.  -It is okay to use acetaminophen/Tylenol as needed up to the procedure.  -Otherwise, continue your prescribed medications as usual on the morning of the procedure. Take these medications with just sips of water, just enough for the medications to go down.  -Do not eat or drink anything after midnight the night before the procedure.   -Follow any instructions given to you from your surgeon.  If there is any conflicting recommendations, please follow the recommendations given to you from your surgery team.    -Dr. Cordova and his team will send the preoperative clearance note to the surgery team once completed.     Hypothyroidism, unspecified type  -     TSH WITH FREE T4 REFLEX; Future    Please seek immediate medical attention (go to the  emergency room or urgent care) for the following reasons: flu like symptoms, feeling ill, or any concerning changes.    Please return to clinic as needed.      -Please see Dr. Ponce as scheduled on 10/4/2024.  It is recommended for you to obtain a general physical including a Pap smear.    Mounika Meyer is a 59 year old, presenting for the following:  Pre-Op Exam          8/22/2024     8:23 AM   Additional Questions   Roomed by ortiz MURRAY related to upcoming procedure: Has a history of bilateral cataracts.  After patient's discussion with the eye doctors, plan is for the above procedure.  Patient endorses that she was told to get the cataract surgery last year, though she was still able to see somewhat, so delayed the surgery.  This year, she definitely has noticed worsening in the vision.          8/22/2024   Pre-Op Questionnaire   Have you ever had a heart attack or stroke? No   Have you ever had surgery on your heart or blood vessels, such as a stent placement, a coronary artery bypass, or surgery on an artery in your head, neck, heart, or legs? No   Do you have chest pain with activity? No   Do you have a history of heart failure? No   Do you currently have a cold, bronchitis or symptoms of other infection? No   Do you have a cough, shortness of breath, or wheezing? No   Do you or anyone in your family have previous history of blood clots? No   Do you or does anyone in your family have a serious bleeding problem such as prolonged bleeding following surgeries or cuts? No   Have you ever had problems with anemia or been told to take iron pills? No   Have you had any abnormal blood loss such as black, tarry or bloody stools, or abnormal vaginal bleeding? No   Have you ever had a blood transfusion? No   Are you willing to have a blood transfusion if it is medically needed before, during, or after your surgery? Yes   Have you or any of your relatives ever had problems with anesthesia? No   Do you have  sleep apnea, excessive snoring or daytime drowsiness? No   Do you have any artifical heart valves or other implanted medical devices like a pacemaker, defibrillator, or continuous glucose monitor? No   Do you have artificial joints? No   Are you allergic to latex? No          Patient Active Problem List    Diagnosis Date Noted    H. pylori infection 07/18/2024     Priority: Medium    Gastroesophageal reflux disease without esophagitis 07/15/2024     Priority: Medium    Flashing lights 07/15/2024     Priority: Medium    Eye dryness 07/15/2024     Priority: Medium    Moderate major depression (H) 12/02/2020     Priority: Medium    Pain in both lower legs 05/19/2020     Priority: Medium    Grief reaction 04/07/2020     Priority: Medium    Atypical squamous cells cannot exclude high grade squamous intraepithelial lesion on cytologic smear of cervix (ASC-H) 01/21/2019     Priority: Medium     2005 ASCUS, Neg HPV  10/1/14 LSIL, Neg HPV  1/21/19 ASC-H, Neg HPV  7/3/23 NIL, Neg HPV. Plan cotest in 1 year due 07/3/24. Letter sent  06/17/24 Reminder Letter   07/15/24 annual--notes added not done  10/4/24 annual and Pap      History of cholecystectomy 04/04/2018     Priority: Medium    Anxiety 09/28/2017     Priority: Medium    Constipation, unspecified constipation type 09/28/2017     Priority: Medium    Chronic bilateral low back pain without sciatica 01/31/2017     Priority: Medium    Hypothyroidism      Priority: Medium     Created by Conversion  Replacement Utility updated for latest IMO load        Rotator cuff tear, right 07/22/2016     Priority: Medium    Vitamin D deficiency 07/22/2016     Priority: Medium    Colon polyp 03/18/2015     Priority: Medium     Colonoscopy 5/29/19  Cecum: nl polyp removed; ascending colon 1 tubular   adenoma and one sessile serrated adenoma; transverse colon tubular   adenoma.  Needs colonoscopy in May of 2022        Chronic gastritis 03/18/2015     Priority: Medium     H. Pylori neg from  "bx  2015          Past Medical History:   Diagnosis Date    Depression     GERD (gastroesophageal reflux disease)     Hypothyroidism     Nonsenile cataract      Past Surgical History:   Procedure Laterality Date     SECTION      COLONOSCOPY WITH CO2 INSUFFLATION N/A 2024    Procedure: Colonoscopy with CO2 insufflation;  Surgeon: Rio Cross DO;  Location: [facility]    COMBINED ESOPHAGOSCOPY, GASTROSCOPY, DUODENOSCOPY (EGD) WITH CO2 INSUFFLATION N/A 2024    Procedure: Combined Esophagoscopy, Gastroscopy, Duodenoscopy (Egd) With Co2 Insufflation;  Surgeon: Rio Cross DO;  Location: [facility] OR    REPAIR PTOSIS       Current Outpatient Medications   Medication Sig Dispense Refill    ketotifen fumarate 0.035% 0.035 % SOLN ophthalmic solution Place 1 drop into both eyes 2 times daily 10 mL 2    levothyroxine (SYNTHROID/LEVOTHROID) 75 MCG tablet Take 1 tablet (75 mcg) by mouth daily 90 tablet 3    omeprazole (PRILOSEC) 20 MG DR capsule TAKE 1 CAPSULE BY MOUTH EVERY MORNING 30 MINUTES BEFORE EATING 90 capsule 0       No Known Allergies     Social History     Tobacco Use    Smoking status: Never     Passive exposure: Never    Smokeless tobacco: Never   Substance Use Topics    Alcohol use: No     Family History   Problem Relation Age of Onset    No Known Problems Mother     No Known Problems Father     Breast Cancer No family hx of     Glaucoma No family hx of     Macular Degeneration No family hx of     Anesthesia Reaction No family hx of     Deep Vein Thrombosis No family hx of     Pulmonary Embolism No family hx of      History   Drug Use No           The 10 point review of system was negative unless otherwise stated in the HPI.      Objective    /64   Pulse 55   Temp 98.4  F (36.9  C) (Oral)   Resp 18   Ht 1.47 m (4' 9.87\")   Wt 54.4 kg (120 lb)   SpO2 96%   BMI 25.19 kg/m     Estimated body mass index is 25.19 kg/m  as calculated from the following:    Height as of " "this encounter: 1.47 m (4' 9.87\").    Weight as of this encounter: 54.4 kg (120 lb).  Physical Exam  GENERAL: alert and no distress  EYES: Eyes grossly normal to inspection, PERRL and conjunctivae and sclerae normal  HENT: ear canals and TM's normal, nose and mouth without ulcers or lesions  NECK: no adenopathy, no asymmetry, masses, or scars  RESP: lungs clear to auscultation - no rales, rhonchi or wheezes  CV: regular rate and rhythm, normal S1 S2, no S3 or S4, no murmur, click or rub, no peripheral edema  ABDOMEN: soft, nontender, no hepatosplenomegaly, no masses and bowel sounds normal  MS: no gross musculoskeletal defects noted, no edema  SKIN: no suspicious lesions or rashes  NEURO: Normal strength and tone, mentation intact and speech normal  PSYCH: mentation appears normal, affect normal/bright    Recent Labs   Lab Test 04/01/24  0856      POTASSIUM 3.3*   CR 0.64   A1C 5.4        Diagnostics  No labs were ordered during this visit.   No EKG required for low risk surgery (cataract, skin procedure, breast biopsy, etc).    Revised Cardiac Risk Index (RCRI)  The patient has the following serious cardiovascular risks for perioperative complications:   - No serious cardiac risks = 0 points     RCRI Interpretation: 0 points: Class I (very low risk - 0.4% complication rate)         Signed Electronically by:     Kimani Cordova MD  [facility] [place] [address]  Phone: [phone]  Fax: [phone]    8/22/2024  8:56 AM    A copy of this evaluation report is provided to the requesting physician.      "

## 2024-08-22 NOTE — LETTER
September 12, 2024      Betsy Negron  [address]  [place] [address]        Dear ,    We are writing to inform you of your test results.    The H.pylori bacteria is no longer present in your stomach. This is great news!    Resulted Orders   Helicobacter pylori Antigen Stool   Result Value Ref Range    Helicobacter pylori Antigen Stool Negative Negative      Comment:      Negative for Helicobacter pylori antigen by enzyme immunoassay. A negative result indicates the absence of H. pylori antigen or that the level of antigen is below the level of detection.   TSH WITH FREE T4 REFLEX   Result Value Ref Range    TSH 3.33 0.30 - 4.20 uIU/mL       If you have any questions or concerns, please call the clinic at the number listed above.       Sincerely,      Kimani Cordova MD

## 2024-08-22 NOTE — PATIENT INSTRUCTIONS
-Thank you for choosing the [facility].  -It was a pleasure to see you today.  -Please take a look at the information below for more specific details regarding the treatment plan and recommendations.  -At the end of this after visit summary is a list of your medications and specific instructions.  Please review this carefully as there may be changes made to your medication list.  -If there are any particular questions or concerns, please feel free to reach out to Dr. Cordova.  -If any labs have been completed, we will reach out to you about results.  If the results are normal or not concerning, a letter or MyChart message will be sent to you.  If any follow-up is needed, either Dr. Cordova or the nurse will give you a call.  If you have not heard regarding results after 2 weeks, please reach out to the clinic.    Patient is cleared for the procedure at this time.    Betsy was seen today for pre-op exam.  Diagnoses and all orders for this visit:    Pre-op exam  Cataract of both eyes, unspecified cataract type    Patient Instructions:  -You are cleared for surgery at this time.   -Limit/avoid usage of NSAIDs (such as ibuprofen/naproxen) within 4 days of the procedure.  -It is okay to use acetaminophen/Tylenol as needed up to the procedure.  -Otherwise, continue your prescribed medications as usual on the morning of the procedure. Take these medications with just sips of water, just enough for the medications to go down.  -Do not eat or drink anything after midnight the night before the procedure.   -Follow any instructions given to you from your surgeon.  If there is any conflicting recommendations, please follow the recommendations given to you from your surgery team.    -Dr. Cordova and his team will send the preoperative clearance note to the surgery team once completed.     Hypothyroidism, unspecified type  -     TSH WITH FREE T4 REFLEX; Future    Please seek immediate medical attention (go to the  emergency room or urgent care) for the following reasons: flu like symptoms, feeling ill, or any concerning changes.    Please return to clinic as needed.        --------------------------------------------------------------------------------------------------------------------    -We are always looking for ways to improve.  You may be selected to receive a survey regarding your visit today.  We encourage you to complete the survey and provide specific, constructive feedback to help us improve our processes.  Thank you for your time!  -Please review the contact information listed on the after visit summary and in the electronic chart.  Below is the phone number that we have on file.  If there are any changes that are needed to be made, please reach out to the clinic.  [phone] (home)

## 2024-08-27 ENCOUNTER — TELEPHONE (OUTPATIENT)
Dept: FAMILY MEDICINE | Facility: CLINIC | Age: 59
End: 2024-08-27
Payer: COMMERCIAL

## 2024-08-27 DIAGNOSIS — E03.9 HYPOTHYROIDISM, UNSPECIFIED TYPE: ICD-10-CM

## 2024-08-27 RX ORDER — LEVOTHYROXINE SODIUM 75 UG/1
75 TABLET ORAL DAILY
Qty: 90 TABLET | Refills: 3 | Status: SHIPPED | OUTPATIENT
Start: 2024-08-27

## 2024-08-27 NOTE — TELEPHONE ENCOUNTER
Team - please call patient with results.    Cora Negron,    I hope you have been well since our last visit. Below are the results from the testing completed at the visit.     The blood test is in the normal range.  Please continue on the thyroid medication as prescribed.  A new prescription has been sent for you.    Please collect the stool sample and return to clinic when you are ready.    Otherwise, Dr. Cordova recommends that you continue on the plan as discussed in clinic.    If there are any questions or concerns, please call the clinic or schedule an appointment for follow up.     Best wishes,           Kimani Cordova MD  [facility]  8/27/2024  8:49 AM    Diagnoses and all orders for this visit:    Hypothyroidism, unspecified type  -     levothyroxine (SYNTHROID/LEVOTHROID) 75 MCG tablet; Take 1 tablet (75 mcg) by mouth daily.

## 2024-09-10 PROCEDURE — 87338 HPYLORI STOOL AG IA: CPT | Performed by: FAMILY MEDICINE

## 2024-09-12 LAB — H PYLORI AG STL QL IA: NEGATIVE

## 2024-09-30 ENCOUNTER — OFFICE VISIT (OUTPATIENT)
Dept: FAMILY MEDICINE | Facility: CLINIC | Age: 59
End: 2024-09-30
Payer: COMMERCIAL

## 2024-09-30 VITALS
WEIGHT: 122.44 LBS | OXYGEN SATURATION: 98 % | HEART RATE: 49 BPM | RESPIRATION RATE: 18 BRPM | BODY MASS INDEX: 24.68 KG/M2 | SYSTOLIC BLOOD PRESSURE: 117 MMHG | HEIGHT: 59 IN | DIASTOLIC BLOOD PRESSURE: 44 MMHG | TEMPERATURE: 97.8 F

## 2024-09-30 DIAGNOSIS — K13.79 MOUTH SORES: Primary | ICD-10-CM

## 2024-09-30 LAB
ALBUMIN SERPL BCG-MCNC: 4.3 G/DL (ref 3.5–5.2)
ALP SERPL-CCNC: 94 U/L (ref 40–150)
ALT SERPL W P-5'-P-CCNC: 14 U/L (ref 0–50)
ANION GAP SERPL CALCULATED.3IONS-SCNC: 9 MMOL/L (ref 7–15)
AST SERPL W P-5'-P-CCNC: 21 U/L (ref 0–45)
BASOPHILS # BLD AUTO: 0 10E3/UL (ref 0–0.2)
BASOPHILS NFR BLD AUTO: 0 %
BILIRUB SERPL-MCNC: 0.6 MG/DL
BUN SERPL-MCNC: 20.4 MG/DL (ref 8–23)
CALCIUM SERPL-MCNC: 8.9 MG/DL (ref 8.8–10.4)
CHLORIDE SERPL-SCNC: 106 MMOL/L (ref 98–107)
CREAT SERPL-MCNC: 0.67 MG/DL (ref 0.51–0.95)
CRP SERPL-MCNC: <3 MG/L
EGFRCR SERPLBLD CKD-EPI 2021: >90 ML/MIN/1.73M2
EOSINOPHIL # BLD AUTO: 0.1 10E3/UL (ref 0–0.7)
EOSINOPHIL NFR BLD AUTO: 3 %
ERYTHROCYTE [DISTWIDTH] IN BLOOD BY AUTOMATED COUNT: 11.7 % (ref 10–15)
ERYTHROCYTE [SEDIMENTATION RATE] IN BLOOD BY WESTERGREN METHOD: 9 MM/HR (ref 0–30)
GLUCOSE SERPL-MCNC: 89 MG/DL (ref 70–99)
HCO3 SERPL-SCNC: 28 MMOL/L (ref 22–29)
HCT VFR BLD AUTO: 43.7 % (ref 35–47)
HGB BLD-MCNC: 15.1 G/DL (ref 11.7–15.7)
HIV 1+2 AB+HIV1 P24 AG SERPL QL IA: NONREACTIVE
IMM GRANULOCYTES # BLD: 0 10E3/UL
IMM GRANULOCYTES NFR BLD: 0 %
LYMPHOCYTES # BLD AUTO: 1.7 10E3/UL (ref 0.8–5.3)
LYMPHOCYTES NFR BLD AUTO: 31 %
MCH RBC QN AUTO: 31.2 PG (ref 26.5–33)
MCHC RBC AUTO-ENTMCNC: 34.6 G/DL (ref 31.5–36.5)
MCV RBC AUTO: 90 FL (ref 78–100)
MONOCYTES # BLD AUTO: 0.3 10E3/UL (ref 0–1.3)
MONOCYTES NFR BLD AUTO: 6 %
NEUTROPHILS # BLD AUTO: 3.2 10E3/UL (ref 1.6–8.3)
NEUTROPHILS NFR BLD AUTO: 60 %
PLATELET # BLD AUTO: 153 10E3/UL (ref 150–450)
POTASSIUM SERPL-SCNC: 4.4 MMOL/L (ref 3.4–5.3)
PROT SERPL-MCNC: 6.9 G/DL (ref 6.4–8.3)
RBC # BLD AUTO: 4.84 10E6/UL (ref 3.8–5.2)
SODIUM SERPL-SCNC: 143 MMOL/L (ref 135–145)
VIT B12 SERPL-MCNC: 644 PG/ML (ref 232–1245)
WBC # BLD AUTO: 5.4 10E3/UL (ref 4–11)

## 2024-09-30 PROCEDURE — 36415 COLL VENOUS BLD VENIPUNCTURE: CPT | Performed by: FAMILY MEDICINE

## 2024-09-30 PROCEDURE — 87389 HIV-1 AG W/HIV-1&-2 AB AG IA: CPT | Performed by: FAMILY MEDICINE

## 2024-09-30 PROCEDURE — 82607 VITAMIN B-12: CPT | Performed by: FAMILY MEDICINE

## 2024-09-30 PROCEDURE — 99214 OFFICE O/P EST MOD 30 MIN: CPT | Mod: 25 | Performed by: FAMILY MEDICINE

## 2024-09-30 PROCEDURE — 90673 RIV3 VACCINE NO PRESERV IM: CPT | Performed by: FAMILY MEDICINE

## 2024-09-30 PROCEDURE — 85652 RBC SED RATE AUTOMATED: CPT | Performed by: FAMILY MEDICINE

## 2024-09-30 PROCEDURE — 86140 C-REACTIVE PROTEIN: CPT | Performed by: FAMILY MEDICINE

## 2024-09-30 PROCEDURE — 90471 IMMUNIZATION ADMIN: CPT | Performed by: FAMILY MEDICINE

## 2024-09-30 PROCEDURE — 80053 COMPREHEN METABOLIC PANEL: CPT | Performed by: FAMILY MEDICINE

## 2024-09-30 PROCEDURE — 85025 COMPLETE CBC W/AUTO DIFF WBC: CPT | Performed by: FAMILY MEDICINE

## 2024-09-30 RX ORDER — NYSTATIN 100000/ML
500000 SUSPENSION, ORAL (FINAL DOSE FORM) ORAL 4 TIMES DAILY
Qty: 280 ML | Refills: 0 | Status: SHIPPED | OUTPATIENT
Start: 2024-09-30 | End: 2024-10-14

## 2024-09-30 ASSESSMENT — PATIENT HEALTH QUESTIONNAIRE - PHQ9
10. IF YOU CHECKED OFF ANY PROBLEMS, HOW DIFFICULT HAVE THESE PROBLEMS MADE IT FOR YOU TO DO YOUR WORK, TAKE CARE OF THINGS AT HOME, OR GET ALONG WITH OTHER PEOPLE: NOT DIFFICULT AT ALL
SUM OF ALL RESPONSES TO PHQ QUESTIONS 1-9: 1
SUM OF ALL RESPONSES TO PHQ QUESTIONS 1-9: 1

## 2024-09-30 NOTE — PATIENT INSTRUCTIONS
Check to see if your insurance covers shingles vaccine, and if you need to get it at the pharmacy. I recommend you get this shot if it is covered by insurance.

## 2024-09-30 NOTE — PROGRESS NOTES
"  Assessment & Plan     Mouth sores  No ulcerations or open lesions on exam today, but she does have findings consistent with yeast. Labs as below and treat w nystatin swish and spit. Follow up if symptoms not resolving with this.   - Comprehensive metabolic panel (BMP + Alb, Alk Phos, ALT, AST, Total. Bili, TP)  - CBC with platelets and differential  - CRP, inflammation  - ESR: Erythrocyte sedimentation rate  - HIV Antigen Antibody Combo  - B12  - nystatin (MYCOSTATIN) 576681 UNIT/ML suspension  Dispense: 280 mL; Refill: 0    Recent cataract surgery  Recommend she call her eye clinic today and schedule be seen regarding eye complaints. Patient needs help with this - to go to specialty  at end of visit for eye clinic scheduling for concerns s/p cataract procedure      BMI  Estimated body mass index is 25.02 kg/m  as calculated from the following:    Height as of this encounter: 1.49 m (4' 10.66\").    Weight as of this encounter: 55.5 kg (122 lb 7 oz).             Mounika Meyer is a 59 year old, presenting for the following health issues:  Dental Pain ( Dry mouth and blister in mouth x1 month)        9/30/2024     8:54 AM   Additional Questions   Roomed by nA KILGORE   Accompanied by self     Pain and possible blisters just inside upper and lower lips for one month. Small open lesion top lip, crusted over, present for 2 days. Has had this type of sore before, long ago. Started as a lump, does not think it blistered.     Tongue \"peeling.\" Eating is \"really rough\", asked to explain rough, she denies much pain. States she cannot taste anything. Symptoms started over a month ago. Mentioned at last visit, expected symptoms would resolve, but symptoms persist.     No new medications. Compliant w levothyroxine. No recent illness or fever. Not aware if previous diagnosis of cold sores.     Limited diet, so she is worried about nutritional deficiencies - eats mostly vegetables. Rarely eats meats, No fruits. States " "she has always been this way - no recent change in appetite/diet. Weight stable.     Recent cataract surgery left eye. Right eye is scheduled but she is thinking of cancelling due to light sensitivity and wind sensitivity in left eye since surgery.                   Objective    /44   Pulse (!) 49   Temp 97.8  F (36.6  C) (Oral)   Resp 18   Ht 1.49 m (4' 10.66\")   Wt 55.5 kg (122 lb 7 oz)   SpO2 98%   BMI 25.02 kg/m    Body mass index is 25.02 kg/m .  Physical Exam   GENERAL: alert and no distress  EYES: Eyes grossly normal to inspection, PERRL and conjunctivae and sclerae normal  HENT: mouth - oral mucosa fairly unremarkable. She does appear to have tiny pin point white plaques on mucosal aspect of lower and upper lip. Tongue, palate, buccal mucosa appear normal. No oral ulcerations or erythema. Blood-crusted lesion bottom lip, not on [place] border but rather entirely on lip, appears consistent with small healing superficial fissure.   NECK: no adenopathy  RESP: lungs clear to auscultation - no rales, rhonchi or wheezes  CV: regular rate and rhythm, normal S1 S2, no S3 or S4, no murmur, click or rub, no peripheral edema              Signed Electronically by: Shabana Erwin MD    "

## 2024-10-22 ENCOUNTER — OFFICE VISIT (OUTPATIENT)
Dept: FAMILY MEDICINE | Facility: CLINIC | Age: 59
End: 2024-10-22
Payer: COMMERCIAL

## 2024-10-22 VITALS
HEIGHT: 59 IN | OXYGEN SATURATION: 96 % | TEMPERATURE: 98.4 F | RESPIRATION RATE: 16 BRPM | WEIGHT: 122.12 LBS | DIASTOLIC BLOOD PRESSURE: 64 MMHG | SYSTOLIC BLOOD PRESSURE: 108 MMHG | BODY MASS INDEX: 24.62 KG/M2 | HEART RATE: 54 BPM

## 2024-10-22 DIAGNOSIS — K12.1 STOMATITIS: Primary | ICD-10-CM

## 2024-10-22 PROCEDURE — 99213 OFFICE O/P EST LOW 20 MIN: CPT | Performed by: FAMILY MEDICINE

## 2024-10-22 RX ORDER — MAGNESIUM HYDROXIDE/ALUMINUM HYDROXICE/SIMETHICONE 120; 1200; 1200 MG/30ML; MG/30ML; MG/30ML
5 SUSPENSION ORAL EVERY 4 HOURS PRN
Qty: 355 ML | Refills: 3 | Status: SHIPPED | OUTPATIENT
Start: 2024-10-22

## 2024-10-22 NOTE — PROGRESS NOTES
"  Assessment & Plan     Stomatitis    Improved, but ongoing.    Does not appear to be herpes virus, or fungal infection.    If not resolving, refer to ENT.      - alum & mag hydroxide-simethicone (MAALOX) 200-200-20 MG/5ML SUSP suspension  Dispense: 355 mL; Refill: 3                  Subjective   Betsy is a 59 year old, presenting for the following health issues:  mouth pain follow up, Results (Pt wants to talk about her last lab result ), and Recheck Medication      10/22/2024    10:13 AM   Additional Questions   Roomed by Stuart Carvalho   Accompanied by Self     History of Present Illness       Reason for visit:  Mouth pain follow up              Nystatin did not help, from 9/30/24 appt.  Labs then were normal: CBC, CMP, CRP, sed rate, B12, HIV.    Current Outpatient Medications   Medication Sig Dispense Refill    alum & mag hydroxide-simethicone (MAALOX) 200-200-20 MG/5ML SUSP suspension Take 5 mLs by mouth every 4 hours as needed for indigestion. 355 mL 3    ketotifen fumarate 0.035% 0.035 % SOLN ophthalmic solution Place 1 drop into both eyes 2 times daily 10 mL 2    levothyroxine (SYNTHROID/LEVOTHROID) 75 MCG tablet Take 1 tablet (75 mcg) by mouth daily. 90 tablet 3    omeprazole (PRILOSEC) 20 MG DR capsule TAKE 1 CAPSULE BY MOUTH EVERY MORNING 30 MINUTES BEFORE EATING 90 capsule 0           Objective    /64 (BP Location: Left arm, Patient Position: Left side, Cuff Size: Adult Regular)   Pulse 54   Temp 98.4  F (36.9  C) (Oral)   Resp 16   Ht 1.49 m (4' 10.66\")   Wt 55.4 kg (122 lb 1.9 oz)   SpO2 96%   BMI 24.95 kg/m    Body mass index is 24.95 kg/m .  Physical Exam     Oropharynx: a few tiny red dots on buccal mucosa near molars  Neck normal  Heart normal  Lungs normal          Signed Electronically by: Maxwell Perez MD    "

## 2025-01-30 PROBLEM — R87.611 ATYPICAL SQUAMOUS CELLS CANNOT EXCLUDE HIGH GRADE SQUAMOUS INTRAEPITHELIAL LESION ON CYTOLOGIC SMEAR OF CERVIX (ASC-H): Status: ACTIVE | Noted: 2019-01-21

## 2025-02-07 ENCOUNTER — APPOINTMENT (OUTPATIENT)
Dept: INTERPRETER SERVICES | Facility: CLINIC | Age: 60
End: 2025-02-07
Payer: COMMERCIAL

## 2025-02-18 ENCOUNTER — TELEPHONE (OUTPATIENT)
Dept: FAMILY MEDICINE | Facility: CLINIC | Age: 60
End: 2025-02-18

## 2025-02-18 ENCOUNTER — OFFICE VISIT (OUTPATIENT)
Dept: FAMILY MEDICINE | Facility: CLINIC | Age: 60
End: 2025-02-18
Payer: COMMERCIAL

## 2025-02-18 VITALS
DIASTOLIC BLOOD PRESSURE: 62 MMHG | BODY MASS INDEX: 26.24 KG/M2 | RESPIRATION RATE: 20 BRPM | OXYGEN SATURATION: 97 % | HEIGHT: 58 IN | HEART RATE: 60 BPM | WEIGHT: 125 LBS | TEMPERATURE: 98.3 F | SYSTOLIC BLOOD PRESSURE: 98 MMHG

## 2025-02-18 DIAGNOSIS — B54 MALARIA: ICD-10-CM

## 2025-02-18 DIAGNOSIS — E03.9 HYPOTHYROIDISM, UNSPECIFIED TYPE: Primary | ICD-10-CM

## 2025-02-18 DIAGNOSIS — H04.129 EYE DRYNESS: ICD-10-CM

## 2025-02-18 DIAGNOSIS — A09 TRAVELER'S DIARRHEA: ICD-10-CM

## 2025-02-18 DIAGNOSIS — Z23 NEED FOR VACCINATION: ICD-10-CM

## 2025-02-18 DIAGNOSIS — K21.9 GASTROESOPHAGEAL REFLUX DISEASE WITHOUT ESOPHAGITIS: ICD-10-CM

## 2025-02-18 PROCEDURE — 90677 PCV20 VACCINE IM: CPT | Performed by: FAMILY MEDICINE

## 2025-02-18 PROCEDURE — 90480 ADMN SARSCOV2 VAC 1/ONLY CMP: CPT | Performed by: FAMILY MEDICINE

## 2025-02-18 PROCEDURE — 90471 IMMUNIZATION ADMIN: CPT | Performed by: FAMILY MEDICINE

## 2025-02-18 PROCEDURE — 99214 OFFICE O/P EST MOD 30 MIN: CPT | Mod: 25 | Performed by: FAMILY MEDICINE

## 2025-02-18 PROCEDURE — 91320 SARSCV2 VAC 30MCG TRS-SUC IM: CPT | Performed by: FAMILY MEDICINE

## 2025-02-18 PROCEDURE — 90472 IMMUNIZATION ADMIN EACH ADD: CPT | Performed by: FAMILY MEDICINE

## 2025-02-18 PROCEDURE — 90691 TYPHOID VACCINE IM: CPT | Performed by: FAMILY MEDICINE

## 2025-02-18 RX ORDER — ATOVAQUONE AND PROGUANIL HYDROCHLORIDE 250; 100 MG/1; MG/1
1 TABLET, FILM COATED ORAL DAILY
Qty: 35 TABLET | Refills: 0 | Status: SHIPPED | OUTPATIENT
Start: 2025-02-18

## 2025-02-18 RX ORDER — OMEPRAZOLE 20 MG/1
CAPSULE, DELAYED RELEASE ORAL
Qty: 90 CAPSULE | Refills: 3 | Status: SHIPPED | OUTPATIENT
Start: 2025-02-18

## 2025-02-18 RX ORDER — KETOTIFEN FUMARATE 0.35 MG/ML
1 SOLUTION/ DROPS OPHTHALMIC 2 TIMES DAILY
Qty: 10 ML | Refills: 2 | Status: CANCELLED | OUTPATIENT
Start: 2025-02-18

## 2025-02-18 RX ORDER — AZITHROMYCIN 500 MG/1
500 TABLET, FILM COATED ORAL DAILY
Qty: 3 TABLET | Refills: 0 | Status: SHIPPED | OUTPATIENT
Start: 2025-02-18 | End: 2025-02-21

## 2025-02-18 RX ORDER — CARBOXYMETHYLCELLULOSE SODIUM 10 MG/ML
2 SOLUTION/ DROPS OPHTHALMIC 3 TIMES DAILY PRN
Qty: 15 ML | Refills: 5 | Status: SHIPPED | OUTPATIENT
Start: 2025-02-18

## 2025-02-18 RX ORDER — LEVOTHYROXINE SODIUM 75 UG/1
75 TABLET ORAL DAILY
Qty: 90 TABLET | Refills: 3 | Status: SHIPPED | OUTPATIENT
Start: 2025-02-18

## 2025-02-18 NOTE — PROGRESS NOTES
ASSESMENT AND PLAN:  Diagnoses and all orders for this visit:  Hypothyroidism, unspecified type  Stable.  Last TSH was normal range, not due for recheck yet.  Refill:  -     levothyroxine (SYNTHROID/LEVOTHROID) 75 MCG tablet; Take 1 tablet (75 mcg) by mouth daily.  Gastroesophageal reflux disease without esophagitis  Under excellent control on omeprazole which will be continued.  -     omeprazole (PRILOSEC) 20 MG DR capsule; TAKE 1 CAPSULE BY MOUTH EVERY MORNING 30 MINUTES BEFORE EATING  Eye dryness  -     carboxymethylcellulose (ARTIFICIAL TEARS) 1 % ophthalmic solution; Apply 2 drops to eye 3 times daily as needed for dry eyes. (Or any covered similar per pharmacist)  Traveler's diarrhea  -     azithromycin (ZITHROMAX) 500 MG tablet; Take 1 tablet (500 mg) by mouth daily for 3 days. If needed for travel diarrhea  Malaria prophylaxis  -     atovaquone-proguanil (MALARONE) 250-100 MG tablet; Take 1 tablet by mouth daily. Start 2 days before travel and continue 7 days after return.  Need for vaccination  -     COVID-19 12+ (PFIZER)  -     Pneumococcal 20 Valent Conjugate (PCV20)  -     TYPHOID VACCINE, IM      Reviewed the risks and benefits of the treatment plan with the patient and/or caregiver and we discussed indications for routine and emergent follow-up.        SUBJECTIVE: Patient in for follow-up on her chronic conditions.  She reports that if she takes her omeprazole every day she does not get any reflux but if she stops taking it she starts to get a burning sensation in her epigastric area and gets it daily.  Patient reports she has been getting some mild irritation of the eyes, left side worse than right, she thinks it is related to dryness that she has had ever since her cataract surgery.  No sudden visual changes.  She has upcoming travel, she will be traveling with the group to [place] and [place].  She believes that they will be in both urban and rural areas during the trip but does not have the exact  "itinerary.  She is going to be gone for 4 weeks.    Past Medical History:   Diagnosis Date    Depression     GERD (gastroesophageal reflux disease)     Hypothyroidism     Nonsenile cataract      Patient Active Problem List   Diagnosis    Hypothyroidism    Colon polyp    Chronic gastritis    Rotator cuff tear, right    Vitamin D deficiency    Chronic bilateral low back pain without sciatica    Anxiety    Constipation, unspecified constipation type    History of cholecystectomy    Grief reaction    Pain in both lower legs    Moderate major depression (H)    Atypical squamous cells cannot exclude high grade squamous intraepithelial lesion on cytologic smear of cervix (ASC-H)    Gastroesophageal reflux disease without esophagitis    Flashing lights    Eye dryness    H. pylori infection     Current Outpatient Medications   Medication Sig Dispense Refill    atovaquone-proguanil (MALARONE) 250-100 MG tablet Take 1 tablet by mouth daily. Start 2 days before travel and continue 7 days after return. 35 tablet 0    azithromycin (ZITHROMAX) 500 MG tablet Take 1 tablet (500 mg) by mouth daily for 3 days. If needed for travel diarrhea 3 tablet 0    carboxymethylcellulose (ARTIFICIAL TEARS) 1 % ophthalmic solution Apply 2 drops to eye 3 times daily as needed for dry eyes. (Or any covered similar per pharmacist) 15 mL 5    levothyroxine (SYNTHROID/LEVOTHROID) 75 MCG tablet Take 1 tablet (75 mcg) by mouth daily. 90 tablet 3    omeprazole (PRILOSEC) 20 MG DR capsule TAKE 1 CAPSULE BY MOUTH EVERY MORNING 30 MINUTES BEFORE EATING 90 capsule 3     History   Smoking Status    Never   Smokeless Tobacco    Never       OBJECTICE: BP 98/62   Pulse 60   Temp 98.3  F (36.8  C) (Oral)   Resp 20   Ht 1.477 m (4' 10.15\")   Wt 56.7 kg (125 lb)   SpO2 97%   BMI 25.99 kg/m       No results found for this or any previous visit (from the past 24 hours).     Eyes-no conjunctival abnormalities, no corneal opacities.    CV-regular rate and " rhythm   RESP-lungs clear   ABDOMINAL-soft, nontender to palpation, no palpable mass   Neck-supple, no palpable mass or thyroid abnormality or lymphadenopathy.      Signed Electronically by: Kranthi Smith MD

## 2025-02-18 NOTE — TELEPHONE ENCOUNTER
Patient Quality Outreach    Patient is due for the following:   Cervical Cancer Screening - PAP Needed  Physical Preventive Adult Physical    Action(s) Taken:   Patient was scheduled for prev adult     Type of outreach:    Phone, spoke to patient/parent. patient    Questions for provider review:    None           Nancy Burgos MA

## 2025-04-01 PROBLEM — H53.8: Status: ACTIVE | Noted: 2024-07-15

## 2025-05-01 ENCOUNTER — OFFICE VISIT (OUTPATIENT)
Dept: FAMILY MEDICINE | Facility: CLINIC | Age: 60
End: 2025-05-01
Payer: COMMERCIAL

## 2025-05-01 VITALS
OXYGEN SATURATION: 99 % | RESPIRATION RATE: 16 BRPM | HEIGHT: 58 IN | SYSTOLIC BLOOD PRESSURE: 110 MMHG | DIASTOLIC BLOOD PRESSURE: 60 MMHG | WEIGHT: 126 LBS | BODY MASS INDEX: 26.45 KG/M2 | HEART RATE: 55 BPM | TEMPERATURE: 98.1 F

## 2025-05-01 DIAGNOSIS — M79.674 PAIN OF RIGHT GREAT TOE: Primary | ICD-10-CM

## 2025-05-01 DIAGNOSIS — L60.0 INGROWN NAIL OF GREAT TOE OF RIGHT FOOT: ICD-10-CM

## 2025-05-01 ASSESSMENT — PATIENT HEALTH QUESTIONNAIRE - PHQ9
SUM OF ALL RESPONSES TO PHQ QUESTIONS 1-9: 1
10. IF YOU CHECKED OFF ANY PROBLEMS, HOW DIFFICULT HAVE THESE PROBLEMS MADE IT FOR YOU TO DO YOUR WORK, TAKE CARE OF THINGS AT HOME, OR GET ALONG WITH OTHER PEOPLE: NOT DIFFICULT AT ALL
SUM OF ALL RESPONSES TO PHQ QUESTIONS 1-9: 1

## 2025-05-01 NOTE — PROGRESS NOTES
Assessment & Plan    Pain of right great toe:  Ingrown nail of great toe of right foot  - Differentials considered include ingrown toenail and gout. Gout was deemed less likely due to the absence of typical symptoms such as joint swelling and redness.  - The chosen diagnosis is an ingrown toenail with purulent discahrge present, supported by the presence of purulent discharge and the patient's description of symptoms.  - The treatment plan involves draining the pus using a lidocaine injection for numbing, draining, applying antibiotic cream, and bandaging the toe. Completed in clinic today  - The patient is provided with additional bandages and antibiotic cream for home use and instructed to change the bandage daily and monitor for signs of infection.  - Would consider further evaluation or referral to a podiatrist if the condition does not improve.       Please seek immediate medical attention (go to the emergency room or urgent care) if symptoms worser or for concerning changes.    Follow-up if symptoms do not improve as anticipated, as needed for acute concern, and May schedule follow-up with me for right  if unable to see PCP due to scheduling availability     ---------------------------------------------------------------------------------------   Subjective   Betsy is a 59 year old year old female, presenting for the following health issues:  Chief Complaint   Patient presents with    right big toe pain          5/1/2025    10:12 AM   Additional Questions   Roomed by ortiz Meyer V Jamil, a 59-year-old female, reported experiencing significant pain in her right big toe for the past 4 weeks, with a notable increase in pain over the last four days. She expressed concern about the pain affecting her ability to wear shoes properly. She mentioned observing pus in the area, suggesting a possible infection. She is worried about the pain and its impact on her daily activities. She also expressed concern about  "the potential for pain following any procedure to address the issue.        Patient submitted visit information  Answers submitted by the patient for this visit:  Provider Visit on 5/1/2025 10:45 AM with Juan Ramon Tinajero  Patient Health Questionnaire (Submitted on 5/1/2025)  If you checked off any problems, how difficult have these problems made it for you to do your work, take care of things at home, or get along with other people?: Not difficult at all  PHQ9 TOTAL SCORE: 1  General Concern (Submitted on 5/1/2025)  Chief Complaint: Chronic problems general questions HPI Form  What is the reason for your visit today?: right big toe painful  When did your symptoms begin?: 3-4 weeks ago    ---------------------------------------------------------------------------------------   Objective    Vital signs: /60   Pulse 55   Temp 98.1  F (36.7  C) (Oral)   Resp 16   Ht 1.477 m (4' 10.15\")   Wt 57.2 kg (126 lb)   SpO2 99%   BMI 26.20 kg/m      Body mass index is 26.2 kg/m .    Physical Exam    General appearance: alert, well appearing, and in no distress  Mental status: alert, oriented to person, place, and time  Extremities: Examination of the right big toe revealed purulent discharge and sanguineous fluid upon drainage. No cuts observed. Pain experienced during the procedure, but ameliorated following anesthetic administration.  Skin: normal coloration and turgor, no rashes, no suspicious skin lesions noted         PROCEDURE NOTE:  Incision and Drainage    Procedure: Incision and Drainage  Diagnosis: Abscess of the left great toe    Consent: Discussed procedure, alternatives, benefits, and risks (including but not limited to infection, bleeding, increased pain, tissue atrophy, etc). Patient voiced understanding and gave consent to proceed.     Procedure: Patient was placed in the fowlers position and the area prepped in sterile fashion using betadine swabs.     The area was anesthetized using a 25g needle " with 1% lidocaine with epinephrine.  A total of 3 mL was utilized for the procedure.    The lesion was drained by same hole used for inject      Hemostasis obtained. The patient tolerated the procedure well without complications.  Home cares were reviewed.    Blood loss: Minimal  Specimen: Skin biopsy      CLAIR Tinajero CNP   [facility]  5/1/2025  12:30 PM    ---------------------------------------------------------------------------------------   Visit was completed along with virtual     Options for treatment and follow-up care were reviewed with the patient. Plia V Jamil and/or guardian was engaged and actively involved in the decision making process. Plia V Jamil and/or guardian verbalized understanding of the options discussed and was satisfied with the final plan.    The longitudinal plan of care for the diagnosis(es)/condition(s) as documented were addressed during this visit. Due to the added complexity in care, I can continue to support Plia in the subsequent management and with ongoing continuity of care related to these items if unable to see (or establish with) PCP.       Patient consented to use of ambient AI scribe prior to initiation of visit.    Signed Electronically by: CARLOS Tapia CNP

## (undated) DEVICE — KIT ENDO FIRST STEP DISINFECTANT 200ML W/POUCH EP-4

## (undated) DEVICE — PAD CHUX UNDERPAD 23X24" 7136

## (undated) RX ORDER — FENTANYL CITRATE 50 UG/ML
INJECTION, SOLUTION INTRAMUSCULAR; INTRAVENOUS
Status: DISPENSED
Start: 2024-08-20